# Patient Record
Sex: MALE | Race: WHITE | NOT HISPANIC OR LATINO | Employment: FULL TIME | URBAN - METROPOLITAN AREA
[De-identification: names, ages, dates, MRNs, and addresses within clinical notes are randomized per-mention and may not be internally consistent; named-entity substitution may affect disease eponyms.]

---

## 2017-03-02 DIAGNOSIS — J45.20 INTERMITTENT ASTHMA, UNCOMPLICATED: ICD-10-CM

## 2017-03-02 NOTE — TELEPHONE ENCOUNTER
Flovent  [facility]G Inhaler       Last Written Prescription Date: 1/31/17  Last Fill Quantity: 1, # refills: 0    Last Office Visit with G, P or [facility] prescribing provider:  8/29/16   Future Office Visit:       Date of Last Asthma Action Plan Letter:   Asthma Action Plan Q1 Year    Topic Date Due     Asthma Action Plan - yearly  08/29/2017      Asthma Control Test:   ACT Total Scores 8/29/2016   ACT TOTAL SCORE -   ASTHMA ER VISITS -   ASTHMA HOSPITALIZATIONS -   ACT TOTAL SCORE (Goal Greater than or Equal to 20) 25   In the past 12 months, how many times did you visit the emergency room for your asthma without being admitted to the hospital? 0   In the past 12 months, how many times were you hospitalized overnight because of your asthma? 0       Date of Last Spirometry Test:   No results found for this or any previous visit.

## 2017-03-03 RX ORDER — FLUTICASONE PROPIONATE 220 UG/1
2 AEROSOL, METERED RESPIRATORY (INHALATION) 2 TIMES DAILY
Qty: 1 INHALER | Refills: 0 | Status: CANCELLED | OUTPATIENT
Start: 2017-03-03

## 2017-03-03 NOTE — TELEPHONE ENCOUNTER
Left message for pt to return call to clinic    Jessica Ngo RN    He is requesting refill of flovent, he needs to make appt to be seen per last refill

## 2017-03-06 NOTE — TELEPHONE ENCOUNTER
Attempted call back. Spoke with a female who states pt was not available.     When pt. Calls back, please assist with setting up asthma follow up, then route to triage once appt. Is set up so medication can be refilled. Thank you.    Jen Romero RN  [facility]

## 2017-03-07 NOTE — TELEPHONE ENCOUNTER
Called and left message stating he needs to call and schedule an appt once this is scheduled, can refill medication.    Jen Romero RN  [facility]

## 2017-03-08 NOTE — TELEPHONE ENCOUNTER
Cecil scheduled, no further action needed. Pt states he has enough medication to get him through to scheduled date.

## 2017-03-17 ENCOUNTER — OFFICE VISIT (OUTPATIENT)
Dept: FAMILY MEDICINE | Facility: CLINIC | Age: 47
End: 2017-03-17
Payer: COMMERCIAL

## 2017-03-17 VITALS
OXYGEN SATURATION: 98 % | TEMPERATURE: 96.6 F | HEIGHT: 71 IN | WEIGHT: 252.3 LBS | BODY MASS INDEX: 35.32 KG/M2 | SYSTOLIC BLOOD PRESSURE: 155 MMHG | HEART RATE: 84 BPM | DIASTOLIC BLOOD PRESSURE: 88 MMHG

## 2017-03-17 DIAGNOSIS — J45.30 MILD PERSISTENT ASTHMA WITHOUT COMPLICATION: ICD-10-CM

## 2017-03-17 DIAGNOSIS — I10 ESSENTIAL HYPERTENSION WITH GOAL BLOOD PRESSURE LESS THAN 140/90: Primary | ICD-10-CM

## 2017-03-17 PROCEDURE — 99214 OFFICE O/P EST MOD 30 MIN: CPT | Performed by: FAMILY MEDICINE

## 2017-03-17 RX ORDER — FLUTICASONE PROPIONATE 220 UG/1
2 AEROSOL, METERED RESPIRATORY (INHALATION) 2 TIMES DAILY
Qty: 1 INHALER | Refills: 11 | Status: SHIPPED | OUTPATIENT
Start: 2017-03-17 | End: 2018-10-03

## 2017-03-17 RX ORDER — ALBUTEROL SULFATE 90 UG/1
2 AEROSOL, METERED RESPIRATORY (INHALATION) EVERY 6 HOURS PRN
Qty: 1 INHALER | Refills: 11 | Status: SHIPPED | OUTPATIENT
Start: 2017-03-17 | End: 2017-09-11

## 2017-03-17 NOTE — MR AVS SNAPSHOT
After Visit Summary   3/17/2017    Tremaine King    MRN: [id number]           Patient Information     Date Of Birth          1970        Visit Information        Provider Department      3/17/2017 2:30 PM Samy Marcelino MD [facility]        Today's Diagnoses     Essential hypertension with goal blood pressure less than 140/90    -  1    Mild persistent asthma without complication          Care Instructions    -Please follow up in August with me and cardiology for a recheck.        Follow-ups after your visit        Your next 10 appointments already scheduled     Jul 03, 2017  9:20 AM CDT   Return Sleep Patient with Simon Walker MD   [facility] [place], Sleep Study ([facility])    [address] [place] [address] [phone]              Who to contact     If you have questions or need follow up information about today's clinic visit or your schedule please contact [facility] directly at [phone].  Normal or non-critical lab and imaging results will be communicated to you by Poeticahart, letter or phone within 4 business days after the clinic has received the results. If you do not hear from us within 7 days, please contact the clinic through real trendst or phone. If you have a critical or abnormal lab result, we will notify you by phone as soon as possible.  Submit refill requests through [employer] [facility] or call your pharmacy and they will forward the refill request to us. Please allow 3 business days for your refill to be completed.          Additional Information About Your Visit        MyChart Information     [employer] gives you secure access to your electronic health record. If you see a primary care provider, you can also send messages to your care team and make appointments. If you have questions, please call your primary care clinic.  If you do not have a primary care provider, please call [phone] and they  "will assist you.        Care EveryWhere ID     This is your Care EveryWhere ID. This could be used by other organizations to access your [facility] medical records  [id number]        Your Vitals Were     Pulse Temperature Height Pulse Oximetry BMI (Body Mass Index)       84 96.6  F (35.9  C) (Oral) 5' 11\" (1.803 m) 98% 35.19 kg/m2        Blood Pressure from Last 3 Encounters:   03/17/17 155/88   08/29/16 120/71   07/06/16 138/89    Weight from Last 3 Encounters:   03/17/17 252 lb 4.8 oz (114.4 kg)   08/29/16 235 lb 4.8 oz (106.7 kg)   07/06/16 238 lb 12.8 oz (108.3 kg)              Today, you had the following     No orders found for display         Where to get your medicines      These medications were sent to [place] [id number] [place] [facility] [address] [place] [address]  [address] [facility] [address]     Phone:  [phone]     albuterol 108 (90 BASE) MCG/ACT Inhaler    fluticasone 220 MCG/ACT Inhaler          Primary Care Provider Office Phone # Fax #    Samy Marcelino [phone]       [facility] [address]  [facility] [address]        Thank you!     Thank you for choosing [facility]  for your care. Our goal is always to provide you with excellent care. Hearing back from our patients is one way we can continue to improve our services. Please take a few minutes to complete the written survey that you may receive in the mail after your visit with us. Thank you!             Your Updated Medication List - Protect others around you: Learn how to safely use, store and throw away your medicines at www.disposemymeds.org.          This list is accurate as of: 3/17/17 11:59 PM.  Always use your most recent med list.                   Brand Name Dispense Instructions for use    albuterol 108 (90 BASE) MCG/ACT Inhaler    albuterol    1 Inhaler    Inhale 2 puffs into the lungs every 6 hours as needed for shortness of breath / dyspnea       allopurinol " 300 MG tablet    ZYLOPRIM    90 tablet    Take 1 tablet (300 mg) by mouth daily       amLODIPine 2.5 MG tablet    NORVASC    270 tablet    Take 3 tabs daily       amoxicillin 500 MG capsule    AMOXIL    12 capsule    Please take a total of 2 grams (four 500 mg capsules) 30-60 minutes prior to dental procedure.       aspirin 325 MG tablet      Take  by mouth daily.       fluticasone 220 MCG/ACT Inhaler    FLOVENT HFA    1 Inhaler    Inhale 2 puffs into the lungs 2 times daily 2 puffs       IBUPROFEN PO      Take 400 mg by mouth 2 times daily as needed.       lisinopril 40 MG tablet    PRINIVIL/ZESTRIL    90 tablet    Take 1 tablet (40 mg) by mouth daily       metoprolol 100 MG 24 hr tablet    TOPROL-XL    90 tablet    Take 1 tablet (100 mg) by mouth daily Must keep 8/29/16 appt for further refills.       order for DME      Equipment being ordered: CPAP Patient Tremaine King was set up at [facility] on September 14, 2015. Patient received a [employer] DreamStation Auto CPAP Auto. Pressures were set at Auto 12 - 12 cm H2O.   Patient?s ramp is 9 cm H2O for Auto and FLEX/EPR is A Flex.  Patient received a [employer] Mask name: BLUE GEL NASAL  Nasal mask Size Medium, heated tubing and heated humidifier.  Patient is not enrolled in the STM Program and does not need to meet compliance. Samina Arora

## 2017-03-17 NOTE — NURSING NOTE
"Chief Complaint   Patient presents with     Asthma       Initial /88  Pulse 84  Temp 96.6  F (35.9  C) (Oral)  Ht 5' 11\" (1.803 m)  Wt 252 lb 4.8 oz (114.4 kg)  SpO2 98%  BMI 35.19 kg/m2 Estimated body mass index is 35.19 kg/(m^2) as calculated from the following:    Height as of this encounter: 5' 11\" (1.803 m).    Weight as of this encounter: 252 lb 4.8 oz (114.4 kg).  Medication Reconciliation: complete   Gely Saba MA'    "

## 2017-03-17 NOTE — PROGRESS NOTES
SUBJECTIVE:                                                    Tremaine King is a 46 year old male who presents to clinic today for the following health issues:    Asthma Follow-Up    Was ACT completed today?    Yes    ACT Total Scores 8/29/2016   ACT TOTAL SCORE -   ASTHMA ER VISITS -   ASTHMA HOSPITALIZATIONS -   ACT TOTAL SCORE (Goal Greater than or Equal to 20) 25   In the past 12 months, how many times did you visit the emergency room for your asthma without being admitted to the hospital? 0   In the past 12 months, how many times were you hospitalized overnight because of your asthma? 0       Recent asthma triggers that patient is dealing with: None    Amount of exercise or physical activity: 1 day/week for an average of 30-45 minutes    Problems taking medications regularly: No    Medication side effects: none    Diet: regular (no restrictions)    Patient says that his asthma is doing well, and that he has been using his inhalers as prescribed, though sometimes he forgets to take the flovent daily. He has not been exercising as regularly as he would like to, but he says that his asthma has been in good control when he exercises and he has to use his albuterol for rescue infrequently. He will run a half marathon in 1 month, and he thinks with training he will be better.    Hypertension Follow-up      Outpatient blood pressures are not being checked.    Low Salt Diet: not monitoring salt     Patient's blood pressure is elevated today, and he thinks it is because he has not been as active as he would like to be. He continues to take his medications as prescribed. He reports that he has been gaining weight and has not been paying as close attention to his diet and exercise as he would like. Patient is planning to run a half marathon in 1 month, and he says that he will start training soon and hopes to get his nutrition back on track. History of mitral valve repair and ASD.    Problem list and histories reviewed  & adjusted, as indicated.  Additional history: as documented    Patient Active Problem List   Diagnosis     History of repair of mitral valve     CARDIOVASCULAR SCREENING; LDL GOAL LESS THAN 130     ASD (atrial septal defect)     Intermittent asthma, uncomplicated     Essential hypertension with goal blood pressure less than 140/90     Idiopathic gout, unspecified chronicity, unspecified site     Past Surgical History   Procedure Laterality Date     Repair valve mitral  AGE 3 AND JUNE 2005     annuloplasty ring and MVR in June 2005     Repair atrial septal defect  AGE 3     Repair valve mitral         Social History   Substance Use Topics     Smoking status: Former Smoker     Types: Cigars     Smokeless tobacco: Never Used      Comment: quit smoking cigarettes and cigars at ~ age 22 (was light smoker for about 4 years))     Alcohol use Yes      Comment: 1-2/day on weekdays, 2-3/day on wknds     Family History   Problem Relation Age of Onset     DIABETES Mother      onset past age 70         Current Outpatient Prescriptions   Medication Sig Dispense Refill     albuterol (ALBUTEROL) 108 (90 BASE) MCG/ACT Inhaler Inhale 2 puffs into the lungs every 6 hours as needed for shortness of breath / dyspnea 1 Inhaler 0     fluticasone (FLOVENT HFA) 220 MCG/ACT Inhaler Inhale 2 puffs into the lungs 2 times daily 2 puffs 1 Inhaler 0     lisinopril (PRINIVIL,ZESTRIL) 40 MG tablet Take 1 tablet (40 mg) by mouth daily 90 tablet 3     allopurinol (ZYLOPRIM) 300 MG tablet Take 1 tablet (300 mg) by mouth daily 90 tablet 3     metoprolol (TOPROL-XL) 100 MG 24 hr tablet Take 1 tablet (100 mg) by mouth daily Must keep 8/29/16 appt for further refills. 90 tablet 3     amLODIPine (NORVASC) 2.5 MG tablet Take 3 tabs daily 270 tablet 3     order for DME Equipment being ordered: CPAP  Patient Tremaine King was set up at [facility] on September 14, 2015. Patient received a [employer] DreamStation Auto CPAP Auto. Pressures were set at  "Auto 12 - 12 cm H2O.   Patient s ramp is 9 cm H2O for Auto and FLEX/EPR is A Flex.  Patient received a [employer] Mask name: BLUE GEL NASAL  Nasal mask Size Medium, heated tubing and heated humidifier.  Patient is not enrolled in the STM Program and does not need to meet compliance. Samina Arora       aspirin 325 MG tablet Take  by mouth daily.       IBUPROFEN PO Take 400 mg by mouth 2 times daily as needed.         amoxicillin (AMOXIL) 500 MG capsule Please take a total of 2 grams (four 500 mg capsules) 30-60 minutes prior to dental procedure. (Patient not taking: Reported on 3/17/2017) 12 capsule 3     No Known Allergies  BP Readings from Last 3 Encounters:   03/17/17 155/88   08/29/16 120/71   07/06/16 138/89    Wt Readings from Last 3 Encounters:   03/17/17 114.4 kg (252 lb 4.8 oz)   08/29/16 106.7 kg (235 lb 4.8 oz)   07/06/16 108.3 kg (238 lb 12.8 oz)         Reviewed and updated as needed this visit by clinical staff  Tobacco  Allergies  Meds  Med Hx  Surg Hx  Fam Hx  Soc Hx      Reviewed and updated as needed this visit by Provider         ROS:  Denies headache, insomnia, chest pain, shortness of breath, cough, heartburn, bowel issues, bladder issues, neck pain, back pain, hip pain, knee pain, ankle pain, or foot pain. Remainder of ROS is negative unless otherwise noted above or in HPI.    This document serves as a record of the services and decisions personally performed and made by Samy Marcelino MD. It was created on his behalf by Jorden Hsu, a trained medical scribe. The creation of this document is based the provider's statements to the medical scribe.  Jorden Hsu 2:56 PM March 17, 2017    OBJECTIVE:                                                    /88  Pulse 84  Temp 96.6  F (35.9  C) (Oral)  Ht 1.803 m (5' 11\")  Wt 114.4 kg (252 lb 4.8 oz)  SpO2 98%  BMI 35.19 kg/m2  Body mass index is 35.19 kg/(m^2).  GENERAL: healthy, alert and no distress  RESP: Forced " expiration slightly prolonged,  lungs clear to auscultation - no rales, rhonchi or wheezes  CV: Soft murmur, regular rate and rhythm, normal S1 S2, no S3 or S4, noclick or rub, no peripheral edema and peripheral pulses strong  NEURO: Normal strength and tone, mentation intact and speech normal  PSYCH: mentation appears normal, affect normal/bright    Diagnostic Test Results:  No results found for this or any previous visit (from the past 24 hour(s)).     ASSESSMENT/PLAN:                                                      (I10) Essential hypertension with goal blood pressure less than 140/90  (primary encounter diagnosis)  Comment: Not at goal. Controlled on lisinopril, metoprolol and amlodipine.  Plan: Continue on current medications. Follow up as needed.    (J45.30) Mild persistent asthma without complication  Comment: Stable and unchanged since last visit. Controlled on albuterol and fluticasone.  Plan: albuterol (ALBUTEROL) 108 (90 BASE) MCG/ACT         Inhaler, fluticasone (FLOVENT HFA) 220 MCG/ACT         Inhaler        Continue on current medication. Follow up as needed.    Patient Instructions   -Please follow up in August with me and cardiology for a recheck.    The information in this document, created by the medical scribe for me, accurately reflects the services I personally performed and the decisions made by me. I have reviewed and approved this document for accuracy prior to leaving the patient care area.   Samy Marcelino MD 2:56 PM March 17, 2017  [facility]

## 2017-03-18 ASSESSMENT — ASTHMA QUESTIONNAIRES: ACT_TOTALSCORE: 24

## 2017-03-18 ASSESSMENT — PATIENT HEALTH QUESTIONNAIRE - PHQ9: SUM OF ALL RESPONSES TO PHQ QUESTIONS 1-9: 0

## 2017-08-10 ENCOUNTER — TELEPHONE (OUTPATIENT)
Dept: FAMILY MEDICINE | Facility: CLINIC | Age: 47
End: 2017-08-10

## 2017-08-10 NOTE — TELEPHONE ENCOUNTER
Called patient/ patient unvailable, sent Campaly[employer] message to schedule HTN follow-up    Honey Lawler CMA

## 2017-08-27 DIAGNOSIS — I10 ESSENTIAL HYPERTENSION WITH GOAL BLOOD PRESSURE LESS THAN 140/90: ICD-10-CM

## 2017-08-28 RX ORDER — AMLODIPINE BESYLATE 2.5 MG/1
TABLET ORAL
Qty: 270 TABLET | Refills: 0 | Status: SHIPPED | OUTPATIENT
Start: 2017-08-28 | End: 2017-09-11

## 2017-08-28 NOTE — TELEPHONE ENCOUNTER
Prescription approved per [facility].    Jessica Ngo RN   [facility]

## 2017-08-28 NOTE — TELEPHONE ENCOUNTER
AMLODIPINE BESYLATE 2.5 MG TAB        Last Written Prescription Date: 8/29/16  Last Fill Quantity: 270, # refills: 3    Last Office Visit with [facility], [facility] or [facility] prescribing provider:  3/17/17   Future Office Visit:    Next 5 appointments (look out 90 days)     Sep 11, 2017  9:30 AM CDT   PHYSICAL with Samy Marcelino MD   [facility]    [address] [place] [address] [phone]                    BP Readings from Last 3 Encounters:   03/17/17 155/88   08/29/16 120/71   07/06/16 138/89

## 2017-09-11 ENCOUNTER — OFFICE VISIT (OUTPATIENT)
Dept: FAMILY MEDICINE | Facility: CLINIC | Age: 47
End: 2017-09-11
Payer: COMMERCIAL

## 2017-09-11 DIAGNOSIS — I10 ESSENTIAL HYPERTENSION WITH GOAL BLOOD PRESSURE LESS THAN 140/90: ICD-10-CM

## 2017-09-11 DIAGNOSIS — J45.30 MILD PERSISTENT ASTHMA WITHOUT COMPLICATION: ICD-10-CM

## 2017-09-11 DIAGNOSIS — Z00.00 ROUTINE GENERAL MEDICAL EXAMINATION AT A HEALTH CARE FACILITY: Primary | ICD-10-CM

## 2017-09-11 PROBLEM — G47.33 OSA (OBSTRUCTIVE SLEEP APNEA): Status: ACTIVE | Noted: 2017-09-11

## 2017-09-11 LAB
ALBUMIN SERPL-MCNC: 4.2 G/DL (ref 3.4–5)
ALP SERPL-CCNC: 75 U/L (ref 40–150)
ALT SERPL W P-5'-P-CCNC: 57 U/L (ref 0–70)
ANION GAP SERPL CALCULATED.3IONS-SCNC: 8 MMOL/L (ref 3–14)
AST SERPL W P-5'-P-CCNC: 43 U/L (ref 0–45)
BILIRUB SERPL-MCNC: 0.7 MG/DL (ref 0.2–1.3)
BUN SERPL-MCNC: 16 MG/DL (ref 7–30)
CALCIUM SERPL-MCNC: 9.7 MG/DL (ref 8.5–10.1)
CHLORIDE SERPL-SCNC: 101 MMOL/L (ref 94–109)
CHOLEST SERPL-MCNC: 168 MG/DL
CO2 SERPL-SCNC: 27 MMOL/L (ref 20–32)
CREAT SERPL-MCNC: 1.11 MG/DL (ref 0.66–1.25)
GFR SERPL CREATININE-BSD FRML MDRD: 70 ML/MIN/1.7M2
GLUCOSE SERPL-MCNC: 129 MG/DL (ref 70–99)
HDLC SERPL-MCNC: 54 MG/DL
LDLC SERPL CALC-MCNC: 91 MG/DL
NONHDLC SERPL-MCNC: 114 MG/DL
POTASSIUM SERPL-SCNC: 4.3 MMOL/L (ref 3.4–5.3)
PROT SERPL-MCNC: 7.7 G/DL (ref 6.8–8.8)
SODIUM SERPL-SCNC: 136 MMOL/L (ref 133–144)
TRIGL SERPL-MCNC: 117 MG/DL

## 2017-09-11 PROCEDURE — 80061 LIPID PANEL: CPT | Performed by: FAMILY MEDICINE

## 2017-09-11 PROCEDURE — 36415 COLL VENOUS BLD VENIPUNCTURE: CPT | Performed by: FAMILY MEDICINE

## 2017-09-11 PROCEDURE — 99396 PREV VISIT EST AGE 40-64: CPT | Performed by: FAMILY MEDICINE

## 2017-09-11 PROCEDURE — 80053 COMPREHEN METABOLIC PANEL: CPT | Performed by: FAMILY MEDICINE

## 2017-09-11 RX ORDER — ALLOPURINOL 300 MG/1
300 TABLET ORAL DAILY
Qty: 90 TABLET | Refills: 3 | Status: SHIPPED | OUTPATIENT
Start: 2017-09-11 | End: 2018-10-03

## 2017-09-11 RX ORDER — METOPROLOL SUCCINATE 100 MG/1
100 TABLET, EXTENDED RELEASE ORAL DAILY
Qty: 90 TABLET | Refills: 3 | Status: SHIPPED | OUTPATIENT
Start: 2017-09-11 | End: 2018-09-28

## 2017-09-11 RX ORDER — ALBUTEROL SULFATE 90 UG/1
2 AEROSOL, METERED RESPIRATORY (INHALATION) EVERY 6 HOURS PRN
Qty: 1 INHALER | Refills: 11 | Status: SHIPPED | OUTPATIENT
Start: 2017-09-11 | End: 2018-10-03

## 2017-09-11 RX ORDER — AMOXICILLIN 500 MG/1
CAPSULE ORAL
Qty: 12 CAPSULE | Refills: 3 | Status: SHIPPED | OUTPATIENT
Start: 2017-09-11 | End: 2018-10-03

## 2017-09-11 RX ORDER — AMLODIPINE BESYLATE 2.5 MG/1
TABLET ORAL
Qty: 270 TABLET | Refills: 3 | Status: SHIPPED | OUTPATIENT
Start: 2017-09-11 | End: 2018-10-03

## 2017-09-11 RX ORDER — LISINOPRIL 40 MG/1
40 TABLET ORAL DAILY
Qty: 90 TABLET | Refills: 3 | Status: SHIPPED | OUTPATIENT
Start: 2017-09-11 | End: 2018-09-28

## 2017-09-11 NOTE — LETTER
My Asthma Action Plan  Name: Tremaine King   YOB: 1970  Date: 9/11/2017   My doctor: Samy Marcelino MD   My clinic: [facility]        My Control Medicine: Fluticasone propionate (Flovent) -   mcg twice daily  My Rescue Medicine: Albuterol (Proair/Ventolin/Proventil) inhaler every 6 hours as needed   My Asthma Severity: mild persistent  Avoid your asthma triggers: Patient is unaware of triggers  None            GREEN ZONE   Good Control    I feel good    No cough or wheeze    Can work, sleep and play without asthma symptoms       Take your asthma control medicine every day.     1. If exercise triggers your asthma, take your rescue medication    15 minutes before exercise or sports, and    During exercise if you have asthma symptoms  2. Spacer to use with inhaler: If you have a spacer, make sure to use it with your inhaler             YELLOW ZONE Getting Worse  I have ANY of these:    I do not feel good    Cough or wheeze    Chest feels tight    Wake up at night   1. Keep taking your Green Zone medications  2. Start taking your rescue medicine:    every 20 minutes for up to 1 hour. Then every 4 hours for 24-48 hours.  3. If you stay in the Yellow Zone for more than 12-24 hours, contact your doctor.  4. If you do not return to the Green Zone in 12-24 hours or you get worse, start taking your oral steroid medicine if prescribed by your provider.           RED ZONE Medical Alert - Get Help  I have ANY of these:    I feel awful    Medicine is not helping    Breathing getting harder    Trouble walking or talking    Nose opens wide to breathe       1. Take your rescue medicine NOW  2. If your provider has prescribed an oral steroid medicine, start taking it NOW  3. Call your doctor NOW  4. If you are still in the Red Zone after 20 minutes and you have not reached your doctor:    Take your rescue medicine again and    Call 911 or go to the emergency room right away    See your  regular doctor within 2 weeks of an Emergency Room or Urgent Care visit for follow-up treatment.        Electronically signed by: Miguel Hsu, September 11, 2017    Annual Reminders:  Meet with Asthma Educator,  Flu Shot in the Fall, consider Pneumonia Vaccination for patients with asthma (aged 19 and older).    Pharmacy:    [id number] [place], [place] - [address] [place] [address] [place] [address]                    Asthma Triggers  How To Control Things That Make Your Asthma Worse    Triggers are things that make your asthma worse.  Look at the list below to help you find your triggers and what you can do about them.  You can help prevent asthma flare-ups by staying away from your triggers.      Trigger                                                          What you can do   Cigarette Smoke  Tobacco smoke can make asthma worse. Do not allow smoking in your home, car or around you.  Be sure no one smokes at a child s day care or school.  If you smoke, ask your health care provider for ways to help you quit.  Ask family members to quit too.  Ask your health care provider for a referral to Quit Plan to help you quit smoking, or call 6-940-316-PLAN.     Colds, Flu, Bronchitis  These are common triggers of asthma. Wash your hands often.  Don t touch your eyes, nose or mouth.  Get a flu shot every year.     Dust Mites  These are tiny bugs that live in cloth or carpet. They are too small to see. Wash sheets and blankets in hot water every week.   Encase pillows and mattress in dust mite proof covers.  Avoid having carpet if you can. If you have carpet, vacuum weekly.   Use a dust mask and HEPA vacuum.   Pollen and Outdoor Mold  Some people are allergic to trees, grass, or weed pollen, or molds. Try to keep your windows closed.  Limit time out doors when pollen count is high.   Ask you health care provider about taking medicine during allergy season.     Animal  Dander  Some people are allergic to skin flakes, urine or saliva from pets with fur or feathers. Keep pets with fur or feathers out of your home.    If you can t keep the pet outdoors, then keep the pet out of your bedroom.  Keep the bedroom door closed.  Keep pets off cloth furniture and away from stuffed toys.     Mice, Rats, and Cockroaches  Some people are allergic to the waste from these pests.   Cover food and garbage.  Clean up spills and food crumbs.  Store grease in the refrigerator.   Keep food out of the bedroom.   Indoor Mold  This can be a trigger if your home has high moisture. Fix leaking faucets, pipes, or other sources of water.   Clean moldy surfaces.  Dehumidify basement if it is damp and smelly.   Smoke, Strong Odors, and Sprays  These can reduce air quality. Stay away from strong odors and sprays, such as perfume, powder, hair spray, paints, smoke incense, paint, cleaning products, candles and new carpet.   Exercise or Sports  Some people with asthma have this trigger. Be active!  Ask your doctor about taking medicine before sports or exercise to prevent symptoms.    Warm up for 5-10 minutes before and after sports or exercise.     Other Triggers of Asthma  Cold air:  Cover your nose and mouth with a scarf.  Sometimes laughing or crying can be a trigger.  Some medicines and food can trigger asthma.

## 2017-09-11 NOTE — NURSING NOTE
"Chief Complaint   Patient presents with     Physical       Initial There were no vitals taken for this visit. Estimated body mass index is 35.19 kg/(m^2) as calculated from the following:    Height as of 3/17/17: 5' 11\" (1.803 m).    Weight as of 3/17/17: 252 lb 4.8 oz (114.4 kg).  Medication Reconciliation: complete     Eva Rosario MA      "

## 2017-09-11 NOTE — PROGRESS NOTES
SUBJECTIVE:   CC: Tremaine King is an 47 year old male who presents for preventative health visit.     Patient says that he has not been exercising or eating a healthy diet over the last year, and was heavier today than expected. He is planning on starting to exercise and eat healthier, and is going to run a 5k later this year. History of atrial septal defect and mitral valve repair, and he has been doing well and is scheduled to see cardiology in November.    Patient says that his asthma has been worse lately since he went to [place] on vacation in August. He continues to use albuterol for rescue and flovent as a preventative, though he does not frequently use twice daily.    Patient has not been having regular eye exams.    Healthy Habits:  Answers for HPI/ROS submitted by the patient on 9/10/2017   Annual Exam:  Getting at least 3 servings of Calcium per day:: Yes  Bi-annual eye exam:: NO  Dental care twice a year:: Yes  Sleep apnea or symptoms of sleep apnea:: Sleep apnea  Diet:: Regular (no restrictions)  Frequency of exercise:: 1 day/week  Taking medications regularly:: Yes  Medication side effects:: None  Additional concerns today:: No  PHQ-2 Score: 0  Duration of exercise:: 30-45 minutes      Today's PHQ-2 Score:   PHQ-2 ( 1999 Pf[employer]) 9/10/2017 3/17/2017   Q1: Little interest or pleasure in doing things 0 0   Q2: Feeling down, depressed or hopeless 0 0   PHQ-2 Score 0 0   Q1: Little interest or pleasure in doing things Not at all -   Q2: Feeling down, depressed or hopeless Not at all -   PHQ-2 Score 0 -         Abuse: Current or Past(Physical, Sexual or Emotional)- No  Do you feel safe in your environment - Yes  Social History   Substance Use Topics     Smoking status: Former Smoker     Types: Cigars     Smokeless tobacco: Never Used      Comment: quit smoking cigarettes and cigars at ~ age 22 (was light smoker for about 4 years))     Alcohol use Yes      Comment: 1-2/day on weekdays, 2-3/day on wknds      The patient does not drink >3 drinks per day nor >7 drinks per week.    Last PSA: No results found for: PSA    Reviewed orders with patient. Reviewed health maintenance and updated orders accordingly - Yes  BP Readings from Last 3 Encounters:   03/17/17 155/88   08/29/16 120/71   07/06/16 138/89    Wt Readings from Last 3 Encounters:   03/17/17 114.4 kg (252 lb 4.8 oz)   08/29/16 106.7 kg (235 lb 4.8 oz)   07/06/16 108.3 kg (238 lb 12.8 oz)        Patient Active Problem List   Diagnosis     History of repair of mitral valve     CARDIOVASCULAR SCREENING; LDL GOAL LESS THAN 130     ASD (atrial septal defect)     Essential hypertension with goal blood pressure less than 140/90     Idiopathic gout, unspecified chronicity, unspecified site     BMI 35.0-35.9,adult     KOTA (obstructive sleep apnea)     Past Surgical History:   Procedure Laterality Date     REPAIR ATRIAL SEPTAL DEFECT  AGE 3     REPAIR VALVE MITRAL  AGE 3 AND JUNE 2005    annuloplasty ring and MVR in June 2005     REPAIR VALVE MITRAL         Social History   Substance Use Topics     Smoking status: Former Smoker     Types: Cigars     Smokeless tobacco: Never Used      Comment: quit smoking cigarettes and cigars at ~ age 22 (was light smoker for about 4 years))     Alcohol use Yes      Comment: 1-2/day on weekdays, 2-3/day on wknds     Family History   Problem Relation Age of Onset     DIABETES Mother      onset past age 70         Current Outpatient Prescriptions   Medication Sig Dispense Refill     amLODIPine (NORVASC) 2.5 MG tablet TAKE 3 TABLETS BY MOUTH DAILY 270 tablet 0     albuterol (ALBUTEROL) 108 (90 BASE) MCG/ACT Inhaler Inhale 2 puffs into the lungs every 6 hours as needed for shortness of breath / dyspnea 1 Inhaler 11     fluticasone (FLOVENT HFA) 220 MCG/ACT Inhaler Inhale 2 puffs into the lungs 2 times daily 2 puffs 1 Inhaler 11     lisinopril (PRINIVIL,ZESTRIL) 40 MG tablet Take 1 tablet (40 mg) by mouth daily 90 tablet 3     allopurinol  (ZYLOPRIM) 300 MG tablet Take 1 tablet (300 mg) by mouth daily 90 tablet 3     metoprolol (TOPROL-XL) 100 MG 24 hr tablet Take 1 tablet (100 mg) by mouth daily Must keep 8/29/16 appt for further refills. 90 tablet 3     order for DME Equipment being ordered: CPAP  Patient Tremaine King was set up at [place] on September 14, 2015. Patient received a [employer] DreamStation Auto CPAP Auto. Pressures were set at Auto 12 - 12 cm H2O.   Patient s ramp is 9 cm H2O for Auto and FLEX/EPR is A Flex.  Patient received a [employer] Mask name: BLUE GEL NASAL  Nasal mask Size Medium, heated tubing and heated humidifier.  Patient is not enrolled in the STM Program and does not need to meet compliance. Samina Arora       aspirin 325 MG tablet Take  by mouth daily.       IBUPROFEN PO Take 400 mg by mouth 2 times daily as needed.         No Known Allergies        Reviewed and updated as needed this visit by clinical staff  Allergies  Meds         Reviewed and updated as needed this visit by Provider            ROS:  Denies headache, insomnia, chest pain, shortness of breath, cough, heartburn, bowel issues, bladder issues, neck pain, back pain, hip pain, knee pain, ankle pain, or foot pain. Remainder of ROS is negative unless otherwise noted above or in HPI.    This document serves as a record of the services and decisions personally performed and made by Samy Marcelino MD. It was created on his behalf by Jorden Hsu, a trained medical scribe. The creation of this document is based the provider's statements to the medical scribe.  Jorden Hsu 9:39 AM September 11, 2017    OBJECTIVE:   There were no vitals taken for this visit.  EXAM:  GENERAL: healthy, alert and no distress  EYES: Eyes grossly normal to inspection, PERRL and conjunctivae and sclerae normal. EOMI.  HENT: ear canals and TM's normal, nose and mouth without ulcers or lesions  NECK: no adenopathy, no asymmetry, masses, or scars  and thyroid normal to palpation. TMJ normal. No bruits.  RESP: lungs clear to auscultation - no rales, rhonchi or wheezes  CV: 3/6 systolic murmur, regular rate and rhythm, normal S1 S2, no S3 or S4, click or rub and peripheral pulses strong  ABDOMEN: soft, nontender, no hepatosplenomegaly, no masses and bowel sounds normal   (male): normal male genitalia without lesions or urethral discharge, no hernia  RECTAL: normal sphincter tone, no rectal masses, prostate normal size, smooth, nontender without nodules or masses  MS: no gross musculoskeletal defects noted, trace edema with hemositiran freckling. Calves nontender.  SKIN: several well healed sternotomy scars well healed  NEURO: Normal strength and tone, mentation intact and speech normal. Knee reflexes normal. Slight tremor.  PSYCH: mentation appears normal, affect normal/bright  LYMPH: no cervical, supraclavicular, axillary, or inguinal adenopathy    ASSESSMENT/PLAN:   (Z00.00) Routine general medical examination at a health care facility  (primary encounter diagnosis)  Comment: Routine physical and labs completed today.  Plan: Follow up with results from lab.    (Z68.35) BMI 35.0-35.9,adult  Comment: Worsened since last visit. Encouraged regular exercise and eating a healthy diet.  Plan: Will continue to monitor. Follow up as needed.    (J45.30) Mild persistent asthma without complication  Comment: Stable, but worsened since last visit. Controlled on albuterol and fluticasone.  Plan: albuterol (PROAIR HFA) 108 (90 BASE) MCG/ACT         Inhaler        Continue on current medications. Follow up as needed.    (Z95.2) S/P MVR (mitral valve replacement)  Comment: Stable and unchanged since last visit. Controlled on amoxicillin as needed. Patient continues to see cardiology.  Plan: amoxicillin (AMOXIL) 500 MG capsule        Continue on amoxicillin as needed. Continue to see cardiology. Follow up as needed.    (I10) Essential hypertension with goal blood pressure  less than 140/90  Comment: Not at goal today. Controlled on lisinopril, amlodipine and metoprolol. Labs completed today.  Plan: metoprolol (TOPROL-XL) 100 MG 24 hr tablet,         lisinopril (PRINIVIL/ZESTRIL) 40 MG tablet,         amLODIPine (NORVASC) 2.5 MG tablet,         Comprehensive metabolic panel, Lipid panel         reflex to direct LDL        Continue on current medications. Follow up with results from lab.    (M10.00) Idiopathic gout, unspecified chronicity, unspecified site  Comment: Controlled on allopurinol.  Plan: allopurinol (ZYLOPRIM) 300 MG tablet        Continue on current medication. Follow up as needed.    Patient Instructions     Preventive Health Recommendations  Male Ages 40 to 49    Yearly exam:             See your health care provider every year in order to  o   Review health changes.   o   Discuss preventive care.    o   Review your medicines if your doctor has prescribed any.    You should be tested each year for STDs (sexually transmitted diseases) if you re at risk.     Have a cholesterol test every 5 years.     Have a colonoscopy (test for colon cancer) if someone in your family has had colon cancer or polyps before age 50.     After age 45, have a diabetes test (fasting glucose). If you are at risk for diabetes, you should have this test every 3 years.      Talk with your health care provider about whether or not a prostate cancer screening test (PSA) is right for you.    Shots: Get a flu shot each year. Get a tetanus shot every 10 years.     Nutrition:    Eat at least 5 servings of fruits and vegetables daily.     Eat whole-grain bread, whole-wheat pasta and brown rice instead of white grains and rice.     Talk to your provider about Calcium and Vitamin D.     Lifestyle    Exercise for at least 150 minutes a week (30 minutes a day, 5 days a week). This will help you control your weight and prevent disease.     Limit alcohol to one drink per day.     No smoking.     Wear sunscreen to  "prevent skin cancer.     See your dentist every six months for an exam and cleaning.          COUNSELING:  Reviewed preventive health counseling, as reflected in patient instructions     reports that he has quit smoking. His smoking use included Cigars. He has never used smokeless tobacco.  Estimated body mass index is 35.19 kg/(m^2) as calculated from the following:    Height as of 3/17/17: 1.803 m (5' 11\").    Weight as of 3/17/17: 114.4 kg (252 lb 4.8 oz).   Weight management plan: Encouraged regular exercise and eating a healthy diet    The information in this document, created by the medical scribe for me, accurately reflects the services I personally performed and the decisions made by me. I have reviewed and approved this document for accuracy prior to leaving the patient care area.   Samy Marcelino MD 9:38 AM September 11, 2017  [facility] [place]  "

## 2017-09-11 NOTE — MR AVS SNAPSHOT
After Visit Summary   9/11/2017    Tremaine King    MRN: [id number]           Patient Information     Date Of Birth          1970        Visit Information        Provider Department      9/11/2017 9:30 AM Samy Marcelino MD [facility]        Today's Diagnoses     Routine general medical examination at a health care facility    -  1    Essential hypertension with goal blood pressure less than 140/90        Mild persistent asthma without complication        BMI 35.0-35.9,adult          Care Instructions      Preventive Health Recommendations  Male Ages 40 to 49    Yearly exam:             See your health care provider every year in order to  o   Review health changes.   o   Discuss preventive care.    o   Review your medicines if your doctor has prescribed any.    You should be tested each year for STDs (sexually transmitted diseases) if you re at risk.     Have a cholesterol test every 5 years.     Have a colonoscopy (test for colon cancer) if someone in your family has had colon cancer or polyps before age 50.     After age 45, have a diabetes test (fasting glucose). If you are at risk for diabetes, you should have this test every 3 years.      Talk with your health care provider about whether or not a prostate cancer screening test (PSA) is right for you.    Shots: Get a flu shot each year. Get a tetanus shot every 10 years.     Nutrition:    Eat at least 5 servings of fruits and vegetables daily.     Eat whole-grain bread, whole-wheat pasta and brown rice instead of white grains and rice.     Talk to your provider about Calcium and Vitamin D.     Lifestyle    Exercise for at least 150 minutes a week (30 minutes a day, 5 days a week). This will help you control your weight and prevent disease.     Limit alcohol to one drink per day.     No smoking.     Wear sunscreen to prevent skin cancer.     See your dentist every six months for an exam and cleaning.               Follow-ups after your visit        Your next 10 appointments already scheduled     Sep 13, 2017  1:00 PM CDT   Return Sleep Patient with Gokul Greer MD   [facility] [place], Sleep Study ([facility])    [address] [place] [id number] [phone]            Nov 01, 2017  8:00 AM CDT   Ech Complete with [id number]    [facility] Echo ([facility])    [address] [place] [id number] [phone]           1. Please bring or wear a comfortable two-piece outfit. 2. You may eat, drink and take your normal medicines. 3. For any questions that cannot be answered, please contact the ordering physician            Nov 08, 2017  7:30 AM CST   (Arrive by 7:15 AM)   Return Visit with Tim Gotti MD   [facility] ([facility])    [address] [place] [id number] [phone]              Who to contact     If you have questions or need follow up information about today's clinic visit or your schedule please contact [facility] directly at [phone].  Normal or non-critical lab and imaging results will be communicated to you by Isofluxhart, letter or phone within 4 business days after the clinic has received the results. If you do not hear from us within 7 days, please contact the clinic through Isofluxhart or phone. If you have a critical or abnormal lab result, we will notify you by phone as soon as possible.  Submit refill requests through [facility] or call your pharmacy and they will forward the refill request to us. Please allow 3 business days for your refill to be completed.          Additional Information About Your Visit        IsofluxharCEDU Information     [facility] gives you secure access to your electronic health record. If you see a primary care provider, you can also send messages to your care team and make appointments. If  you have questions, please call your primary care clinic.  If you do not have a primary care provider, please call [phone] and they will assist you.        Care EveryWhere ID     This is your Care EveryWhere ID. This could be used by other organizations to access your [facility] medical records  [id number]         Blood Pressure from Last 3 Encounters:   03/17/17 155/88   08/29/16 120/71   07/06/16 138/89    Weight from Last 3 Encounters:   03/17/17 252 lb 4.8 oz (114.4 kg)   08/29/16 235 lb 4.8 oz (106.7 kg)   07/06/16 238 lb 12.8 oz (108.3 kg)              We Performed the Following     Comprehensive metabolic panel     Lipid panel reflex to direct LDL          Today's Medication Changes          These changes are accurate as of: 9/11/17 10:31 AM.  If you have any questions, ask your nurse or doctor.               These medicines have changed or have updated prescriptions.        Dose/Directions    amLODIPine 2.5 MG tablet   Commonly known as:  NORVASC   This may have changed:  See the new instructions.   Used for:  Essential hypertension with goal blood pressure less than 140/90        TAKE 3 TABLETS BY MOUTH DAILY   Quantity:  270 tablet   Refills:  3            Where to get your medicines      These medications were sent to [place] [address] [place] [address]  [address] [place] [address]     Phone:  [phone]     albuterol 108 (90 BASE) MCG/ACT Inhaler    allopurinol 300 MG tablet    amLODIPine 2.5 MG tablet    amoxicillin 500 MG capsule    lisinopril 40 MG tablet    metoprolol 100 MG 24 hr tablet                Primary Care Provider Office Phone # Fax #    Samy Marcelino [phone]       [address] [facility] [address]  [facility] [address]        Equal Access to Services     ALISA STOLL : Ceferino Brown, donya gabriel, lucy gagnon. So [facility] [phone].    ATENCIÓN: Arturo culp  español, tiene a aguilar disposición servicios gratuitos de asistencia lingüística. Andry barnett [phone].    We comply with applicable federal civil rights laws and [place] laws. We do not discriminate on the basis of race, color, national origin, age, disability sex, sexual orientation or gender identity.            Thank you!     Thank you for choosing [facility]  for your care. Our goal is always to provide you with excellent care. Hearing back from our patients is one way we can continue to improve our services. Please take a few minutes to complete the written survey that you may receive in the mail after your visit with us. Thank you!             Your Updated Medication List - Protect others around you: Learn how to safely use, store and throw away your medicines at www.disposemymeds.org.          This list is accurate as of: 9/11/17 10:31 AM.  Always use your most recent med list.                   Brand Name Dispense Instructions for use Diagnosis    albuterol 108 (90 BASE) MCG/ACT Inhaler    PROAIR HFA    1 Inhaler    Inhale 2 puffs into the lungs every 6 hours as needed for shortness of breath / dyspnea    Mild persistent asthma without complication       allopurinol 300 MG tablet    ZYLOPRIM    90 tablet    Take 1 tablet (300 mg) by mouth daily        amLODIPine 2.5 MG tablet    NORVASC    270 tablet    TAKE 3 TABLETS BY MOUTH DAILY    Essential hypertension with goal blood pressure less than 140/90       amoxicillin 500 MG capsule    AMOXIL    12 capsule    Please take a total of 2 grams (four 500 mg capsules) 30-60 minutes prior to dental procedure.        aspirin 325 MG tablet      Take  by mouth daily.        fluticasone 220 MCG/ACT Inhaler    FLOVENT HFA    1 Inhaler    Inhale 2 puffs into the lungs 2 times daily 2 puffs    Mild persistent asthma without complication       IBUPROFEN PO      Take 400 mg by mouth 2 times daily as needed.        lisinopril 40 MG tablet    PRINIVIL/ZESTRIL     90 tablet    Take 1 tablet (40 mg) by mouth daily    Essential hypertension with goal blood pressure less than 140/90       metoprolol 100 MG 24 hr tablet    TOPROL-XL    90 tablet    Take 1 tablet (100 mg) by mouth daily Must keep 8/29/16 appt for further refills.    Essential hypertension with goal blood pressure less than 140/90       order for DME      Equipment being ordered: CPAP Patient Tremaine King was set up at [place] on September 14, 2015. Patient received a [employer] DreamStation Auto CPAP Auto. Pressures were set at Auto 12 - 12 cm H2O.   Patient?s ramp is 9 cm H2O for Auto and FLEX/EPR is A Flex.  Patient received a [employer] Mask name: BLUE GEL NASAL  Nasal mask Size Medium, heated tubing and heated humidifier.  Patient is not enrolled in the STM Program and does not need to meet compliance. Samina Arora

## 2017-09-13 ENCOUNTER — OFFICE VISIT (OUTPATIENT)
Dept: SLEEP MEDICINE | Facility: CLINIC | Age: 47
End: 2017-09-13
Attending: INTERNAL MEDICINE
Payer: COMMERCIAL

## 2017-09-13 VITALS
OXYGEN SATURATION: 99 % | RESPIRATION RATE: 18 BRPM | WEIGHT: 257 LBS | HEART RATE: 82 BPM | BODY MASS INDEX: 35.98 KG/M2 | SYSTOLIC BLOOD PRESSURE: 161 MMHG | HEIGHT: 71 IN | DIASTOLIC BLOOD PRESSURE: 87 MMHG

## 2017-09-13 DIAGNOSIS — G47.33 OSA ON CPAP: Primary | ICD-10-CM

## 2017-09-13 PROCEDURE — 99211 OFF/OP EST MAY X REQ PHY/QHP: CPT | Mod: ZF

## 2017-09-13 NOTE — PATIENT INSTRUCTIONS
Your BMI is Body mass index is 35.84 kg/(m^2).  Weight management is a personal decision.  If you are interested in exploring weight loss strategies, the following discussion covers the approaches that may be successful. Body mass index (BMI) is one way to tell whether you are at a healthy weight, overweight, or obese. It measures your weight in relation to your height.  A BMI of 18.5 to 24.9 is in the healthy range. A person with a BMI of 25 to 29.9 is considered overweight, and someone with a BMI of 30 or greater is considered obese. More than two-thirds of American adults are considered overweight or obese.  Being overweight or obese increases the risk for further weight gain. Excess weight may lead to heart disease and diabetes.  Creating and following plans for healthy eating and physical activity may help you improve your health.  Weight control is part of healthy lifestyle and includes exercise, emotional health, and healthy eating habits. Careful eating habits lifelong are the mainstay of weight control. Though there are significant health benefits from weight loss, long-term weight loss with diet alone may be very difficult to achieve- studies show long-term success with dietary management in less than 10% of people. Attaining a healthy weight may be especially difficult to achieve in those with severe obesity. In some cases, medications, devices and surgical management might be considered.  What can you do?  If you are overweight or obese and are interested in methods for weight loss, you should discuss this with your provider.     Consider reducing daily calorie intake by 500 calories.     Keep a food journal.     Avoiding skipping meals, consider cutting portions instead.    Diet combined with exercise helps maintain muscle while optimizing fat loss. Strength training is particularly important for building and maintaining muscle mass. Exercise helps reduce stress, increase energy, and improves fitness.  Increasing exercise without diet control, however, may not burn enough calories to loose weight.       Start walking three days a week 10-20 minutes at a time    Work towards walking thirty minutes five days a week     Eventually, increase the speed of your walking for 1-2 minutes at time    In addition, we recommend that you review healthy lifestyles and methods for weight loss available through the National Institutes of Health patient information sites:  http://win.niddk.nih.gov/publications/index.htm    And look into health and wellness programs that may be available through your health insurance provider, employer, local community center, or [employer].    Weight management plan: Patient was referred to their PCP to discuss a diet and exercise plan.    Your blood pressure was checked while you were in clinic today.  Please read the guidelines below about what these numbers mean and what you should do about them.  Your systolic blood pressure is the top number.  This is the pressure when the heart is pumping.  Your diastolic blood pressure is the bottom number.  This is the pressure in between beats.  If your systolic blood pressure is less than 120 and your diastolic blood pressure is less than 80, then your blood pressure is normal. There is nothing more that you need to do about it  If your systolic blood pressure is 120-139 or your diastolic blood pressure is 80-89, your blood pressure may be higher than it should be.  You should have your blood pressure re-checked within a year by a primary care provider.  If your systolic blood pressure is 140 or greater or your diastolic blood pressure is 90 or greater, you may have high blood pressure.  High blood pressure is treatable, but if left untreated over time it can put you at risk for heart attack, stroke, or kidney failure.  You should have your blood pressure re-checked by a primary care provider within the next four weeks.

## 2017-09-13 NOTE — PROGRESS NOTES
Sleep medicine follow-up visit note    Date of visit: September 13, 2017    Purpose of visit: follow-up of KOTA    History of present illness: Tremaine King  is a 47-year-old male who was previously diagnosed with moderate obstructive sleep apnea. He is currently being treated with the CPAP device with fixed pressure  setting a 12 cm of water. He had  his  CPAP device  replaced in 2015. He reports using the CPAP regularly during sleep. He has not had the supplies replaced in a long time. He uses nasal mask. With the CPAP there have not been any concerns of snoring or awakenings due to gasping for air or choking sensation. He reports positive benefits with good sleep quality with the CPAP use. During the workdays his bedtime is 11 PM and he wakes up at 5 AM with an alarm. During weekends his bed time is 11 PM and he wakes up between 7 to 7:30 AM spontaneously. He reports feeling refreshed most mornings and denies fatigue or excessive daytime sleepiness. He endorses and [place] sleepiness score of 0 out of 24. There have not been any  hospitalizations due to medical CVD or other medical condition since he was last seen at the sleep clinic. He denies  drowsiness while driving.    Compliance download:for the past month reveals that he has used the device for 30 out  of 30 days with an average daily usage of 6 hours and 28 minutes on the nights used. There was no significant air leak. Residual AHI was 1.0 per hour.    Current meds:  Current Outpatient Prescriptions   Medication Sig Dispense Refill     albuterol (PROAIR HFA) 108 (90 BASE) MCG/ACT Inhaler Inhale 2 puffs into the lungs every 6 hours as needed for shortness of breath / dyspnea 1 Inhaler 11     amoxicillin (AMOXIL) 500 MG capsule Please take a total of 2 grams (four 500 mg capsules) 30-60 minutes prior to dental procedure. 12 capsule 3     metoprolol (TOPROL-XL) 100 MG 24 hr tablet Take 1 tablet (100 mg) by mouth daily Must keep 8/29/16 appt for further  refills. 90 tablet 3     allopurinol (ZYLOPRIM) 300 MG tablet Take 1 tablet (300 mg) by mouth daily 90 tablet 3     lisinopril (PRINIVIL/ZESTRIL) 40 MG tablet Take 1 tablet (40 mg) by mouth daily 90 tablet 3     amLODIPine (NORVASC) 2.5 MG tablet TAKE 3 TABLETS BY MOUTH DAILY 270 tablet 3     fluticasone (FLOVENT HFA) 220 MCG/ACT Inhaler Inhale 2 puffs into the lungs 2 times daily 2 puffs 1 Inhaler 11     order for DME Equipment being ordered: CPAP  Patient Tremaine King was set up at [facility] on September 14, 2015. Patient received a [employer] DreamStation Auto CPAP Auto. Pressures were set at Auto 12 - 12 cm H2O.   Patient s ramp is 9 cm H2O for Auto and FLEX/EPR is A Flex.  Patient received a [employer] Mask name: BLUE GEL NASAL  Nasal mask Size Medium, heated tubing and heated humidifier.  Patient is not enrolled in the STM Program and does not need to meet compliance. Samina Arora       aspirin 325 MG tablet Take  by mouth daily.       IBUPROFEN PO Take 400 mg by mouth 2 times daily as needed.         Past medical history:  Past Medical History:   Diagnosis Date     ASD (atrial septal defect)     s/p repair     Patient Active Problem List   Diagnosis     History of repair of mitral valve     CARDIOVASCULAR SCREENING; LDL GOAL LESS THAN 130     ASD (atrial septal defect)     Essential hypertension with goal blood pressure less than 140/90     Idiopathic gout, unspecified chronicity, unspecified site     BMI 35.0-35.9,adult     KOTA (obstructive sleep apnea)     Past surgical history:  Past Surgical History:   Procedure Laterality Date     REPAIR ATRIAL SEPTAL DEFECT  AGE 3     REPAIR VALVE MITRAL  AGE 3 AND JUNE 2005    annuloplasty ring and MVR in June 2005     REPAIR VALVE MITRAL       Allergies:No Known Allergies     Social history:  Social History     Social History     Marital status:      Spouse name: N/A     Number of children: N/A     Years of education: N/A     Occupational  "History     Not on file.     Social History Main Topics     Smoking status: Former Smoker     Types: Cigars     Smokeless tobacco: Never Used      Comment: quit smoking cigarettes and cigars at ~ age 22 (was light smoker for about 4 years))     Alcohol use Yes      Comment: 1-2/day on weekdays, 2-3/day on wknds     Drug use: No     Sexual activity: Yes     Partners: Female     Birth control/ protection: Male Surgical     Other Topics Concern     Parent/Sibling W/ Cabg, Mi Or Angioplasty Before 65f 55m? No     Social History Narrative      Family history:  Family History   Problem Relation Age of Onset     DIABETES Mother      onset past age 70     Physical exam:  /87  Pulse 82  Resp 18  Ht 1.803 m (5' 11\")  Wt 116.6 kg (257 lb)  SpO2 99%  BMI 35.84 kg/m2  General appearance:  in no apparent distress  Pt is dressed casually, cooperative with good eye contact.   Speech is spontaneous with regular rate and volume.   Mood: euthymic; affect congruent with full range and intensity.   Sensorium: awake, alert and oriented to person, place, time, and situation.    Assessment/Plan:  1. Moderate obstructive sleep apnea: patient reports adequate compliance with  CPAP device and continues to report positive benefits with the device use. Based in the compliance measures KOTA appears to be adequately controlled with CPAP at the current pressure settings. I have  provided him with a prescription for renewal of all the CPAP supplies. Recommended him to continue using the CPAP device regularly during sleep and instructed him to get the supplies for the device regularly replaced.    We discussed weight management with diet and exercise. He is following up with his primary care provider for weight management.    2. Chronic insufficient sleep: He reports insufficient sleep duration on work nights. we discussed the consequences of chronic sleep deprivation and encouraged him to increase the duration of sleep to  at least 7 " "hours to 8 hours of sleep per night. We discussed about slowly and gradually advancing his bedtime   from 11 PM to at 10 PM.    He was instructed to avoid driving/ operating heavy machinery, if drowsy or sleepy.    3. Hypertension:  he is on Toprol, Norvasc and lisinopril and  reports being compliant with his medications. His blood pressure was high today he was recommended to monitor his blood pressure,  a log of the blood pressure readings and follow up with his primary care provider for optimizing the management of the hypertension.    Plan is to follow up at sleep clinic  in one year sooner there any concerns.    Chart documentation done in part with Dragon Voice recognition Software. Although reviewed after completion, some word and grammatical error may remain.    \"I spent a total of 25 minutes  face to face with Tremaine King during today's office visit. Over 50% of this time was spent counseling the patient and coordinating care regarding CPAP, increasing sleep duration and hypertension .\"       Gokul Greer MD   of Medicine,  Division of Pulmonary/Sleep Medicine  [facility].      "

## 2017-09-13 NOTE — MR AVS SNAPSHOT
After Visit Summary   9/13/2017    Tremaine King    MRN: [id number]           Patient Information     Date Of Birth          1970        Visit Information        Provider Department      9/13/2017 1:00 PM Gokul Greer MD [facility] [place], Sleep Study        Today's Diagnoses     KOTA on CPAP    -  1      Care Instructions      Your BMI is Body mass index is 35.84 kg/(m^2).  Weight management is a personal decision.  If you are interested in exploring weight loss strategies, the following discussion covers the approaches that may be successful. Body mass index (BMI) is one way to tell whether you are at a healthy weight, overweight, or obese. It measures your weight in relation to your height.  A BMI of 18.5 to 24.9 is in the healthy range. A person with a BMI of 25 to 29.9 is considered overweight, and someone with a BMI of 30 or greater is considered obese. More than two-thirds of American adults are considered overweight or obese.  Being overweight or obese increases the risk for further weight gain. Excess weight may lead to heart disease and diabetes.  Creating and following plans for healthy eating and physical activity may help you improve your health.  Weight control is part of healthy lifestyle and includes exercise, emotional health, and healthy eating habits. Careful eating habits lifelong are the mainstay of weight control. Though there are significant health benefits from weight loss, long-term weight loss with diet alone may be very difficult to achieve- studies show long-term success with dietary management in less than 10% of people. Attaining a healthy weight may be especially difficult to achieve in those with severe obesity. In some cases, medications, devices and surgical management might be considered.  What can you do?  If you are overweight or obese and are interested in methods for weight loss, you should discuss this with your provider.     Consider  reducing daily calorie intake by 500 calories.     Keep a food journal.     Avoiding skipping meals, consider cutting portions instead.    Diet combined with exercise helps maintain muscle while optimizing fat loss. Strength training is particularly important for building and maintaining muscle mass. Exercise helps reduce stress, increase energy, and improves fitness. Increasing exercise without diet control, however, may not burn enough calories to loose weight.       Start walking three days a week 10-20 minutes at a time    Work towards walking thirty minutes five days a week     Eventually, increase the speed of your walking for 1-2 minutes at time    In addition, we recommend that you review healthy lifestyles and methods for weight loss available through the National Institutes of Health patient information sites:  http://win.niddk.nih.gov/publications/index.htm    And look into health and wellness programs that may be available through your health insurance provider, employer, local community center, or [employer]celle club.    Weight management plan: Patient was referred to their PCP to discuss a diet and exercise plan.    Your blood pressure was checked while you were in clinic today.  Please read the guidelines below about what these numbers mean and what you should do about them.  Your systolic blood pressure is the top number.  This is the pressure when the heart is pumping.  Your diastolic blood pressure is the bottom number.  This is the pressure in between beats.  If your systolic blood pressure is less than 120 and your diastolic blood pressure is less than 80, then your blood pressure is normal. There is nothing more that you need to do about it  If your systolic blood pressure is 120-139 or your diastolic blood pressure is 80-89, your blood pressure may be higher than it should be.  You should have your blood pressure re-checked within a year by a primary care provider.  If your systolic blood pressure is  140 or greater or your diastolic blood pressure is 90 or greater, you may have high blood pressure.  High blood pressure is treatable, but if left untreated over time it can put you at risk for heart attack, stroke, or kidney failure.  You should have your blood pressure re-checked by a primary care provider within the next four weeks.            Follow-ups after your visit        Follow-up notes from your care team     Return in about 1 year (around 9/13/2018).      Your next 10 appointments already scheduled     Nov 01, 2017  8:00 AM CDT   Ech Complete with [id number]   [facility] ([facility])    [address] [place] [id number] [phone]           1. Please bring or wear a comfortable two-piece outfit. 2. You may eat, drink and take your normal medicines. 3. For any questions that cannot be answered, please contact the ordering physician            Nov 08, 2017  7:30 AM CST   (Arrive by 7:15 AM)   Return Visit with Tim Gotti MD   [facility] ([facility])    [address] [place] [id number] [phone]              Who to contact     If you have questions or need follow up information about today's clinic visit or your schedule please [facility] directly at [phone].  Normal or non-critical lab and imaging results will be communicated to you by MyChart, letter or phone within 4 business days after the clinic has received the results. If you do not hear from us within 7 days, please contact the clinic through MyChart or phone. If you have a critical or abnormal lab result, we will notify you by phone as soon as possible.  Submit refill requests through [employer] or call your pharmacy and they will forward the refill request to us. Please allow 3 business days for your refill to be completed.          Additional Information About Your Visit        [employer]  "Information     [employer] gives you secure access to your electronic health record. If you see a primary care provider, you can also send messages to your care team and make appointments. If you have questions, please call your primary care clinic.  If you do not have a primary care provider, please call [phone] and they will assist you.        Care EveryWhere ID     This is your Care EveryWhere ID. This could be used by other organizations to access your [facility] medical records  [id number]        Your Vitals Were     Pulse Respirations Height Pulse Oximetry BMI (Body Mass Index)       82 18 1.803 m (5' 11\") 99% 35.84 kg/m2        Blood Pressure from Last 3 Encounters:   09/13/17 161/87   03/17/17 155/88   08/29/16 120/71    Weight from Last 3 Encounters:   09/13/17 116.6 kg (257 lb)   03/17/17 114.4 kg (252 lb 4.8 oz)   08/29/16 106.7 kg (235 lb 4.8 oz)              We Performed the Following     Comprehensive DME        Primary Care Provider Office Phone # Fax #    Samy Marcelino [phone]       [address] [facility] [address]  [facility] [address]        Equal Access to Services     ALISA STOLL : Hadii aad ku hadasho Soomaali, waaxda luqadaha, qaybta kaalmada adeegyada, waxay idiin haydarienn kelli khan laed melvin. So [facility] [phone].    ATENCIÓN: Si habla español, tiene a aguilar disposición servicios gratuitos de asistencia lingüística. Llame al [phone].    We comply with applicable federal civil rights laws and [place] laws. We do not discriminate on the basis of race, color, national origin, age, disability sex, sexual orientation or gender identity.            Thank you!     Thank you for choosing [facility]EP [facility]  for your care. Our goal is always to provide you with excellent care. Hearing back from our patients is one way we can continue to improve our services. Please take a few minutes to complete the written survey that you may receive in the mail after your " visit with us. Thank you!             Your Updated Medication List - Protect others around you: Learn how to safely use, store and throw away your medicines at www.disposemymeds.org.          This list is accurate as of: 9/13/17 11:59 PM.  Always use your most recent med list.                   Brand Name Dispense Instructions for use Diagnosis    albuterol 108 (90 BASE) MCG/ACT Inhaler    PROAIR HFA    1 Inhaler    Inhale 2 puffs into the lungs every 6 hours as needed for shortness of breath / dyspnea    Mild persistent asthma without complication       allopurinol 300 MG tablet    ZYLOPRIM    90 tablet    Take 1 tablet (300 mg) by mouth daily        amLODIPine 2.5 MG tablet    NORVASC    270 tablet    TAKE 3 TABLETS BY MOUTH DAILY    Essential hypertension with goal blood pressure less than 140/90       amoxicillin 500 MG capsule    AMOXIL    12 capsule    Please take a total of 2 grams (four 500 mg capsules) 30-60 minutes prior to dental procedure.        aspirin 325 MG tablet      Take  by mouth daily.        fluticasone 220 MCG/ACT Inhaler    FLOVENT HFA    1 Inhaler    Inhale 2 puffs into the lungs 2 times daily 2 puffs    Mild persistent asthma without complication       IBUPROFEN PO      Take 400 mg by mouth 2 times daily as needed.        lisinopril 40 MG tablet    PRINIVIL/ZESTRIL    90 tablet    Take 1 tablet (40 mg) by mouth daily    Essential hypertension with goal blood pressure less than 140/90       metoprolol 100 MG 24 hr tablet    TOPROL-XL    90 tablet    Take 1 tablet (100 mg) by mouth daily Must keep 8/29/16 appt for further refills.    Essential hypertension with goal blood pressure less than 140/90       order for DME      Equipment being ordered: CPAP Patient Tremaine King was set up at [facility] on September 14, 2015. Patient received a [employer] DreamStation Auto CPAP Auto. Pressures were set at Auto 12 - 12 cm H2O.   Patient?s ramp is 9 cm H2O for Auto and FLEX/EPR is A Flex.   Patient received a [employer] Mask name: BLUE GEL NASAL  Nasal mask Size Medium, heated tubing and heated humidifier.  Patient is not enrolled in the STM Program and does not need to meet compliance. Samina Arora

## 2017-09-13 NOTE — NURSING NOTE
"    Chief Complaint   Patient presents with     CPAP Follow Up       Initial /87  Pulse 82  Resp 18  Ht 1.803 m (5' 11\")  Wt 116.6 kg (257 lb)  SpO2 99%  BMI 35.84 kg/m2 Estimated body mass index is 35.84 kg/(m^2) as calculated from the following:    Height as of this encounter: 1.803 m (5' 11\").    Weight as of this encounter: 116.6 kg (257 lb).    Neck circumference: 17.5 inches / 44.5 centimeters.    Medication Reconciliation: complete        Homa Knapp CMA     "

## 2017-09-18 DIAGNOSIS — R73.9 HYPERGLYCEMIA: Primary | ICD-10-CM

## 2017-09-18 NOTE — PROGRESS NOTES
Shree Penaloza: Your recent results are normal except slightly elevated glucose. Recommend fasting lab only recheck glucose and A1c in 2 weeks. Contact if questions; nice to see you!     Samy

## 2017-11-01 ENCOUNTER — RADIANT APPOINTMENT (OUTPATIENT)
Dept: CARDIOLOGY | Facility: CLINIC | Age: 47
End: 2017-11-01

## 2017-11-01 DIAGNOSIS — Z87.74 H/O ATRIAL SEPTAL DEFECT REPAIR: ICD-10-CM

## 2017-11-01 DIAGNOSIS — Z95.2 S/P MVR (MITRAL VALVE REPLACEMENT): ICD-10-CM

## 2017-11-06 ENCOUNTER — PRE VISIT (OUTPATIENT)
Dept: CARDIOLOGY | Facility: CLINIC | Age: 47
End: 2017-11-06

## 2017-11-08 ENCOUNTER — OFFICE VISIT (OUTPATIENT)
Dept: CARDIOLOGY | Facility: CLINIC | Age: 47
End: 2017-11-08
Attending: INTERNAL MEDICINE
Payer: COMMERCIAL

## 2017-11-08 VITALS
HEIGHT: 71 IN | WEIGHT: 256.8 LBS | BODY MASS INDEX: 35.95 KG/M2 | SYSTOLIC BLOOD PRESSURE: 149 MMHG | HEART RATE: 83 BPM | OXYGEN SATURATION: 99 % | DIASTOLIC BLOOD PRESSURE: 94 MMHG

## 2017-11-08 DIAGNOSIS — Z87.74 H/O ATRIAL SEPTAL DEFECT REPAIR: ICD-10-CM

## 2017-11-08 DIAGNOSIS — Z98.890 S/P MITRAL VALVE REPAIR: Primary | ICD-10-CM

## 2017-11-08 PROCEDURE — 99212 OFFICE O/P EST SF 10 MIN: CPT | Mod: ZF

## 2017-11-08 PROCEDURE — 99214 OFFICE O/P EST MOD 30 MIN: CPT | Mod: ZP | Performed by: INTERNAL MEDICINE

## 2017-11-08 ASSESSMENT — PAIN SCALES - GENERAL: PAINLEVEL: NO PAIN (0)

## 2017-11-08 NOTE — MR AVS SNAPSHOT
After Visit Summary   11/8/2017    Tremaine King    MRN: [id number]           Patient Information     Date Of Birth          1970        Visit Information        Provider Department      11/8/2017 7:30 AM Tim Gotti MD [facility]        Care Instructions    You were seen today in the Cardiovascular Clinic at the [facility].     Cardiology Providers you saw during your visit: Dr Tim Gotti    Diagnosis: History of mitral valve regurgitation and s/p ASD repair    Results: Dr Gotti reviewed the results of your echo with you in clinic today    Recommendations:   1.  No changes at this time.    Follow-up: Follow up with Dr Gotti in one year with an echo       For emergencies call 911.    For any scheduling needs or nursing related questions, please call [phone].    Thank you for your visit today! If you have questions or concerns about today's visit, please call me.      Roberth Young RN  RN Care Coordinator  [facility]  [phone]    Press option 1 for the University and then 3 for nursing related questions                  Follow-ups after your visit        Who to contact     If you have questions or need follow up information about today's clinic visit or your schedule please contact [facility] directly at [phone].  Normal or non-critical lab and imaging results will be communicated to you by MyChart, letter or phone within 4 business days after the clinic has received the results. If you do not hear from us within 7 days, please contact the clinic through MyChart or phone. If you have a critical or abnormal lab result, we will notify you by phone as soon as possible.  Submit refill requests through [employer] or call your pharmacy and they will forward the refill request to us. Please allow 3 business days for your refill to be completed.          Additional Information About Your Visit        MyChart Information      [employer] gives you secure access to your electronic health record. If you see a primary care provider, you can also send messages to your care team and make appointments. If you have questions, please call your primary care clinic.  If you do not have a primary care provider, please call [phone] and they will assist you.        Care EveryWhere ID     This is your Care EveryWhere ID. This could be used by other organizations to access your [facility] medical records  [id number]        Your Vitals Were     Pulse Height Pulse Oximetry BMI (Body Mass Index)          83 1.803 m (5' 11\") 99% 35.82 kg/m2         Blood Pressure from Last 3 Encounters:   11/08/17 (!) 149/94   09/13/17 161/87   03/17/17 155/88    Weight from Last 3 Encounters:   11/08/17 116.5 kg (256 lb 12.8 oz)   09/13/17 116.6 kg (257 lb)   03/17/17 114.4 kg (252 lb 4.8 oz)              Today, you had the following     No orders found for display       Primary Care Provider Office Phone # Fax #    Samy Marcelino [phone]       [address] [facility] [address]  [facility] [address]        Equal Access to Services     ALISA STOLL : Hadii aad ku hadasho Soomaali, waaxda luqadaha, qaybta kaalmada adeegyada, waxay srikanth haydarienn kelli khan laed . So [facility] [place] [phone].    ATENCIÓN: Si habla español, tiene a aguilar disposición servicios gratuitos de asistencia lingüística. Llame al [phone].    We comply with applicable federal civil rights laws and [place] laws. We do not discriminate on the basis of race, color, national origin, age, disability, sex, sexual orientation, or gender identity.            Thank you!     Thank you for choosing [facility]  for your care. Our goal is always to provide you with excellent care. Hearing back from our patients is one way we can continue to improve our services. Please take a few minutes to complete the written survey that you may receive in the mail after your visit with us. Thank " you!             Your Updated Medication List - Protect others around you: Learn how to safely use, store and throw away your medicines at www.disposemymeds.org.          This list is accurate as of: 11/8/17  8:10 AM.  Always use your most recent med list.                   Brand Name Dispense Instructions for use Diagnosis    albuterol 108 (90 BASE) MCG/ACT Inhaler    PROAIR HFA    1 Inhaler    Inhale 2 puffs into the lungs every 6 hours as needed for shortness of breath / dyspnea    Mild persistent asthma without complication       allopurinol 300 MG tablet    ZYLOPRIM    90 tablet    Take 1 tablet (300 mg) by mouth daily        amLODIPine 2.5 MG tablet    NORVASC    270 tablet    TAKE 3 TABLETS BY MOUTH DAILY    Essential hypertension with goal blood pressure less than 140/90       amoxicillin 500 MG capsule    AMOXIL    12 capsule    Please take a total of 2 grams (four 500 mg capsules) 30-60 minutes prior to dental procedure.        aspirin 325 MG tablet      Take  by mouth daily.        fluticasone 220 MCG/ACT Inhaler    FLOVENT HFA    1 Inhaler    Inhale 2 puffs into the lungs 2 times daily 2 puffs    Mild persistent asthma without complication       IBUPROFEN PO      Take 400 mg by mouth 2 times daily as needed.        lisinopril 40 MG tablet    PRINIVIL/ZESTRIL    90 tablet    Take 1 tablet (40 mg) by mouth daily    Essential hypertension with goal blood pressure less than 140/90       metoprolol 100 MG 24 hr tablet    TOPROL-XL    90 tablet    Take 1 tablet (100 mg) by mouth daily Must keep 8/29/16 appt for further refills.    Essential hypertension with goal blood pressure less than 140/90       order for DME      Equipment being ordered: CPAP Patient Tremaine SHEETS Christine was set up at [facility] on September 14, 2015. Patient received a [employer] RespirCarbon Digital DreamStation Auto CPAP Auto. Pressures were set at Auto 12 - 12 cm H2O.   Patient?s ramp is 9 cm H2O for Auto and FLEX/EPR is A Flex.  Patient received a  [employer] Mask name: BLUE GEL NASAL  Nasal mask Size Medium, heated tubing and heated humidifier.  Patient is not enrolled in the STM Program and does not need to meet compliance. Samina Arora

## 2017-11-08 NOTE — NURSING NOTE
Cardiac Testing: Patient given instructions regarding  echocardiogram . Discussed purpose, preparation, procedure and when to expect results reported back to the patient. Patient demonstrated understanding of this information and agreed to call with further questions or concerns.    Med Reconcile: Reviewed and verified all current medications with the patient. The updated medication list was printed and given to the patient.    Return Appointment: Follow up with Dr Gotti in one year with an echo prior.  Patient given instructions regarding scheduling next clinic visit. Patient demonstrated understanding of this information and agreed to call with further questions or concerns.    Patient stated he understood all health information given and agreed to call with further questions or concerns.    Roberth Young, RN  RN Care Coordinator  [facility]  [phone]

## 2017-11-08 NOTE — PROGRESS NOTES
2017            Samy Marcelino MD    [facility]   [address]   [place]  [address]       RE:  Tremaine King   MRN:  [id number]   :  1970      Dear Dr. Marcelino:      It was a pleasure participating in the care of your patient, Mr. Tremaine King.  As you know, he is a 47-year-old gentleman who I see today for a history of mitral valve repair and ASD repair.      His past medical history is significant for the followin.  Migraine headaches.   2.  Gout.   3.  Seasonal allergies with a verbal history of mild asthma.   4.  Meniscus tear of the knee.   5.  History of morbid obesity with weight loss via diet and exercise.   6.  Sleep apnea, treated with CPAP since .      His cardiac history is significant for having had ASD repair along with mitral valve repair at age 3.  He had subsequent mitral insufficiency and required mitral valve repair with an annuloplasty ring in  by Dr. Hernandez.  His postoperative course was complicated by a high resting pulse and Dr. Diehl performed an EP study which did not induce significant arrhythmias and he was diagnosed as having inappropriate sinus tachycardia with first degree AV block and was medically managed.      I last saw him 2016.  At that time, he was doing adequately.  He presents today for continuing care.      Since our last visit, he has continued to do well.  He ran a 5K race in October without symptoms.  He walks 15-20 minutes a day and can walk up and down 2 flights of stairs without a problem.  He has just started an exercise biking program to increase his level of activity and lose weight and he feels well doing this.  His blood pressures at home have been well controlled in the 130 mmHg range.  He denies any new chest pain, problems breathing, PND, orthopnea, edema, palpitations, syncope or near-syncope.  He feels that his exertional capacity has not changed and he has no other complaints.      SOCIAL  HISTORY:  He is a  and continues to enjoy watching educational YouTube videos and reading a balance of what he calls depressing nonfiction and uplifting humerus science fiction fantasy.      CURRENT MEDICATIONS:     1.  Metoprolol 100 mg a day.   2.  Lisinopril 40 mg a day.   3.  Amlodipine 7.5 mg a day.   4.  Aspirin 325 mg a day.      PHYSICAL EXAMINATION:     VITAL SIGNS:  His blood pressure is 140/90 with a pulse of 83.  His weight is 256 pounds.   NECK:  Reveals no obvious jugular venous distention.   LUNGS:  Clear to auscultation.  Respiratory effort is normal.   CARDIAC:  Reveals a regular rate and rhythm.  There is a soft 2/6 systolic ejection murmur in the aortic position, early peaking in nature.  There is a barely audible 1/6 diastolic murmur present.   ABDOMEN:  Belly soft, nontender.   EXTREMITIES:  Without gross edema.      His echocardiogram 11/01/2017 reveals normal ejection fraction of 60-65%.  Mitral gradient is 9 mmHg.  No change from prior normal PA pressure.        IMPRESSION:      Tremaine is a 47-year-old gentleman whose cardiac history is significant for having an atrial septal defect and mitral valve repair at age 3 with a mitral annuloplasty ring in 2005 for mitral insufficiency.  He had a formal diagnosis of inappropriate sinus tachycardia with first-degree atrioventricular block and is medically managed with Toprol.  He has several active cardiac issues:     1.  Mitral valve repair.      From a clinical standpoint, he is exertionally unlimited and is able to run a 5K race without symptoms.  He can run up and down 2 flights of stairs without a problem and is currently exercising on his biking program in an unlimited fashion.  His echocardiogram on 11/01/2017 reveals that his mitral gradient of 9 mmHg has not changed over the years.  We will continue to monitor him clinically.     2.  Hypertension.      His blood pressures have been running in the 130 mmHg range at home and we  will continue to monitor this as well.        PLAN:     1.  He will continue to monitor his blood pressures at home, and if he finds that his blood pressures begin to inch above 140 mmHg on a regular basis, then he will increase his amlodipine from 7.5 to 10 mg a day.  Otherwise, he can follow up with me with a repeat echo in 1 year or earlier if needed.      Once again, it was a pleasure participating in the care of your patient, Mr. Tremaine Horn.  Please feel free to contact me at any time if there are any questions regarding his care in the future.         Sincerely,      JC MCLAIN MD             D: 2017 08:14   T: 2017 09:24   MT: CASANDRA      Name:     TREMAINE HORN   MRN:      [id number]        Account:      [id number]   :      1970      Document: [id number]       cc: Samy Marcelino MD

## 2017-11-08 NOTE — LETTER
2017      RE: Tremaine King  [address] [facility] [address]     2017            Samy Marcelino MD    [facility]   [address]   [place]  [address]       RE:  Tremaine King   MRN:  [id number]   :  1970      Dear Dr. Marcelino:      It was a pleasure participating in the care of your patient, Mr. Tremaine King.  As you know, he is a 47-year-old gentleman who I see today for a history of mitral valve repair and ASD repair.      His past medical history is significant for the followin.  Migraine headaches.   2.  Gout.   3.  Seasonal allergies with a verbal history of mild asthma.   4.  Meniscus tear of the knee.   5.  History of morbid obesity with weight loss via diet and exercise.   6.  Sleep apnea, treated with CPAP since .      His cardiac history is significant for having had ASD repair along with mitral valve repair at age 3.  He had subsequent mitral insufficiency and required mitral valve repair with an annuloplasty ring in  by Dr. Hernandez.  His postoperative course was complicated by a high resting pulse and Dr. Diehl performed an EP study which did not induce significant arrhythmias and he was diagnosed as having inappropriate sinus tachycardia with first degree AV block and was medically managed.      I last saw him 2016.  At that time, he was doing adequately.  He presents today for continuing care.      Since our last visit, he has continued to do well.  He ran a 5K race in October without symptoms.  He walks 15-20 minutes a day and can walk up and down 2 flights of stairs without a problem.  He has just started an exercise biking program to increase his level of activity and lose weight and he feels well doing this.  His blood pressures at home have been well controlled in the 130 mmHg range.  He denies any new chest pain, problems breathing, PND, orthopnea, edema, palpitations, syncope or near-syncope.  He feels that his  exertional capacity has not changed and he has no other complaints.      SOCIAL HISTORY:  He is a  and continues to enjoy watching educational YouTube videos and reading a balance of what he calls depressing nonfiction and uplifting humerus science fiction fantasy.      CURRENT MEDICATIONS:   1.  Metoprolol 100 mg a day.   2.  Lisinopril 40 mg a day.   3.  Amlodipine 7.5 mg a day.   4.  Aspirin 325 mg a day.      PHYSICAL EXAMINATION:   VITAL SIGNS:  His blood pressure is 140/90 with a pulse of 83.  His weight is 256 pounds.   NECK:  Reveals no obvious jugular venous distention.   LUNGS:  Clear to auscultation.  Respiratory effort is normal.   CARDIAC:  Reveals a regular rate and rhythm.  There is a soft 2/6 systolic ejection murmur in the aortic position, early peaking in nature.  There is a barely audible 1/6 diastolic murmur present.   ABDOMEN:  Belly soft, nontender.   EXTREMITIES:  Without gross edema.      His echocardiogram 11/01/2017 reveals normal ejection fraction of 60-65%.  Mitral gradient is 9 mmHg.  No change from prior normal PA pressure.      IMPRESSION:  Tremaine is a 47-year-old gentleman whose cardiac history is significant for having an atrial septal defect and mitral valve repair at age 3 with a mitral annuloplasty ring in 2005 for mitral insufficiency.  He had a formal diagnosis of inappropriate sinus tachycardia with first-degree atrioventricular block and is medically managed with Toprol.  He has several active cardiac issues:   1.  Mitral valve repair.  From a clinical standpoint, he is exertionally unlimited and is able to run a 5K race without symptoms.  He can run up and down 2 flights of stairs without a problem and is currently exercising on his biking program in an unlimited fashion.  His echocardiogram on 11/01/2017 reveals that his mitral gradient of 9 mmHg has not changed over the years.  We will continue to monitor him clinically.   2.  Hypertension.  His blood pressures  have been running in the 130 mmHg range at home and we will continue to monitor this as well.      PLAN:   1.  He will continue to monitor his blood pressures at home, and if he finds that his blood pressures begin to inch above 140 mmHg on a regular basis, then he will increase his amlodipine from 7.5 to 10 mg a day.  Otherwise, he can follow up with me with a repeat echo in 1 year or earlier if needed.      Once again, it was a pleasure participating in the care of your patient, Mr. Tremaine Horn.  Please feel free to contact me at any time if there are any questions regarding his care in the future.         Sincerely,      JC MCLAIN MD        cc: Samy Marcelino MD         D: 2017 08:14   T: 2017 09:24   MT: TS      Name:     TREMAINE HORN   MRN:      [id number]        Account:      [id number]   :      1970      Document: [id number]

## 2017-11-08 NOTE — PATIENT INSTRUCTIONS
You were seen today in the Cardiovascular Clinic at the [facility].     Cardiology Providers you saw during your visit: Dr Tim Gotti    Diagnosis: History of mitral valve regurgitation and s/p ASD repair    Results: Dr Gotti reviewed the results of your echo with you in clinic today    Recommendations:   1.  No changes at this time.    Follow-up: Follow up with Dr Gotti in one year with an echo       For emergencies call 911.    For any scheduling needs or nursing related questions, please call [phone].    Thank you for your visit today! If you have questions or concerns about today's visit, please call me.      Roberth Young RN  RN Care Coordinator  [facility]  [phone]    Press option 1 for the [place] and then 3 for nursing related questions

## 2017-11-08 NOTE — LETTER
11/8/2017      RE: Tremaine SHEETS Christine  [address]  [facility] [address]       Dear Colleague,    Thank you for the opportunity to participate in the care of your patient, Tremaine King, at the [facility]. Please see a copy of my visit note below.    See dictation [id number]      Please do not hesitate to contact me if you have any questions/concerns.     Sincerely,     Tim Gotti MD

## 2017-11-08 NOTE — NURSING NOTE
Chief Complaint   Patient presents with     Follow Up For     heart problem--45 year old male with history of mitral valve regurgitation and ASD s/p reair and hypertension presenting for follow up     Vitals were taken and medications were reconciled.   Marilou Warner,UMANGA  7:27 AM

## 2018-04-27 ENCOUNTER — TELEPHONE (OUTPATIENT)
Dept: FAMILY MEDICINE | Facility: CLINIC | Age: 48
End: 2018-04-27

## 2018-04-27 NOTE — LETTER
"[address] [id number] [phone]          April 27, 2018    Tremaine King                                                                                                                     [address] [facility] [address]      Dear Tremaine    To help your primary care provider and care team understand how well your asthma is in control. We would like you to complete the attached ACT form that consists of 7 questions and 2 follow up questions regarding any hospitalization. Please mail the completed form back in the enclosed self-addressed stamped envelope.     If you have mychart, you can also send your responses in a \"message my care team\" message.        If you have questions, please contact us at [phone].    Sincerely,      Guero Chappell    "

## 2018-04-27 NOTE — TELEPHONE ENCOUNTER
Panel Management Review      Patient has the following on his problem list:     Asthma review     ACT Total Scores 3/17/2017   ACT TOTAL SCORE -   ASTHMA ER VISITS -   ASTHMA HOSPITALIZATIONS -   ACT TOTAL SCORE (Goal Greater than or Equal to 20) 24   In the past 12 months, how many times did you visit the emergency room for your asthma without being admitted to the hospital? 0   In the past 12 months, how many times were you hospitalized overnight because of your asthma? 0      1. Is Asthma diagnosis on the Problem List? Yes    2. Is Asthma listed on Health Maintenance? Yes    3. Patient is due for:  ACT      Composite cancer screening  Chart review shows that this patient is due/due soon for the following None  Summary:    Patient is due/failing the following:   ACT    Action needed:   Patient needs to do ACT.    Type of outreach:    Sent letter.    Questions for provider review:    None                                                                                                                                    Guero Voss routed to Care Team .

## 2018-05-22 ENCOUNTER — TRANSFERRED RECORDS (OUTPATIENT)
Dept: HEALTH INFORMATION MANAGEMENT | Facility: CLINIC | Age: 48
End: 2018-05-22

## 2018-05-24 ENCOUNTER — TRANSFERRED RECORDS (OUTPATIENT)
Dept: HEALTH INFORMATION MANAGEMENT | Facility: CLINIC | Age: 48
End: 2018-05-24

## 2018-06-07 ENCOUNTER — TRANSFERRED RECORDS (OUTPATIENT)
Dept: HEALTH INFORMATION MANAGEMENT | Facility: CLINIC | Age: 48
End: 2018-06-07

## 2018-06-27 ASSESSMENT — ASTHMA QUESTIONNAIRES: ACT_TOTALSCORE: 25

## 2018-07-12 ENCOUNTER — TRANSFERRED RECORDS (OUTPATIENT)
Dept: HEALTH INFORMATION MANAGEMENT | Facility: CLINIC | Age: 48
End: 2018-07-12

## 2018-10-02 NOTE — PROGRESS NOTES
SUBJECTIVE:   CC: Tremaine King is an 48 year old male who presents for preventative health visit.     Physical   Annual:     Getting at least 3 servings of Calcium per day:  Yes    Bi-annual eye exam:  Yes    Dental care twice a year:  NO    Sleep apnea or symptoms of sleep apnea:  Sleep apnea    Diet:  Regular (no restrictions)    Frequency of exercise:  2-3 days/week    Duration of exercise:  15-30 minutes    Taking medications regularly:  Yes    Medication side effects:  None    Additional concerns today:  No    Vision  His vision is 20/20 and 20/100. He did not notice the vision change until he went in to get his license changed.     Cardiovascular  He gets an ECHO every 2 years. His Mitral valve was fixed with a ring. He has a high heart rate as a result. He denies chest pain.     Asthma  His asthma is under control. He uses his rescue inhaler occasionally.     Musculoskeletal  Patient went to Orthopedics as he over extended himself to kill a spider recently. He was disappointed as he was on week 5 of running. He was given PT exercises. He is walking 1-2 miles 3x a week. Patient would like to get back on his bike. He is doing meditation right now    GI  Patient denies bowel trouble.      He denies bladder trouble.       Today's PHQ-2 Score:   PHQ-2 ( 1999 Pfizer) 10/2/2018   Q1: Little interest or pleasure in doing things 0   Q2: Feeling down, depressed or hopeless 0   PHQ-2 Score 0   Q1: Little interest or pleasure in doing things Not at all   Q2: Feeling down, depressed or hopeless Not at all   PHQ-2 Score 0       Abuse: Current or Past(Physical, Sexual or Emotional)- No  Do you feel safe in your environment - Yes    Social History   Substance Use Topics     Smoking status: Former Smoker     Types: Cigars     Smokeless tobacco: Never Used      Comment: quit smoking cigarettes and cigars at ~ age 22 (was light smoker for about 4 years))     Alcohol use Yes      Comment: 1-2/day on weekdays, 2-3/day on wknds      Alcohol Use 10/2/2018   If you drink alcohol do you typically have greater than 3 drinks per day OR greater than 7 drinks per week? No   No flowsheet data found.    Last PSA: No results found for: PSA    Reviewed orders with patient. Reviewed health maintenance and updated orders accordingly - Yes  Labs reviewed in EPIC  BP Readings from Last 3 Encounters:   11/08/17 (!) 149/94   09/13/17 161/87   03/17/17 155/88    Wt Readings from Last 3 Encounters:   11/08/17 116.5 kg (256 lb 12.8 oz)   09/13/17 116.6 kg (257 lb)   03/17/17 114.4 kg (252 lb 4.8 oz)                  Patient Active Problem List   Diagnosis     History of repair of mitral valve     CARDIOVASCULAR SCREENING; LDL GOAL LESS THAN 130     ASD (atrial septal defect)     Essential hypertension with goal blood pressure less than 140/90     Idiopathic gout, unspecified chronicity, unspecified site     BMI 35.0-35.9,adult     KOTA (obstructive sleep apnea)     Lumbar disc herniation     Past Surgical History:   Procedure Laterality Date     REPAIR ATRIAL SEPTAL DEFECT  AGE 3     REPAIR VALVE MITRAL  AGE 3 AND JUNE 2005    annuloplasty ring and MVR in June 2005     REPAIR VALVE MITRAL         Social History   Substance Use Topics     Smoking status: Former Smoker     Types: Cigars     Smokeless tobacco: Never Used      Comment: quit smoking cigarettes and cigars at ~ age 22 (was light smoker for about 4 years))     Alcohol use Yes      Comment: 1-2/day on weekdays, 2-3/day on wknds     Family History   Problem Relation Age of Onset     Diabetes Mother      onset past age 70         Current Outpatient Prescriptions   Medication Sig Dispense Refill     albuterol (PROAIR HFA) 108 (90 BASE) MCG/ACT Inhaler Inhale 2 puffs into the lungs every 6 hours as needed for shortness of breath / dyspnea 1 Inhaler 11     allopurinol (ZYLOPRIM) 300 MG tablet Take 1 tablet (300 mg) by mouth daily 90 tablet 3     amLODIPine (NORVASC) 2.5 MG tablet TAKE 3 TABLETS BY MOUTH  DAILY 270 tablet 3     amoxicillin (AMOXIL) 500 MG capsule Please take a total of 2 grams (four 500 mg capsules) 30-60 minutes prior to dental procedure. 12 capsule 3     aspirin 325 MG tablet Take  by mouth daily.       fluticasone (FLOVENT HFA) 220 MCG/ACT Inhaler Inhale 2 puffs into the lungs 2 times daily 2 puffs 1 Inhaler 11     IBUPROFEN PO Take 400 mg by mouth 2 times daily as needed.         lisinopril (PRINIVIL/ZESTRIL) 40 MG tablet TAKE 1 TABLET (40 MG) BY MOUTH DAILY 90 tablet 3     metoprolol succinate (TOPROL-XL) 100 MG 24 hr tablet TAKE 1 TABLET (100 MG) BY MOUTH DAILY. MUST KEEP 8/29/16 APPT FOR FURTHER REFILLS. 90 tablet 3     order for DME Equipment being ordered: CPAP  Patient Tremaine King was set up at [place] on September 14, 2015. Patient received a [employer] DreamStation Auto CPAP Auto. Pressures were set at Auto 12 - 12 cm H2O.   Patient s ramp is 9 cm H2O for Auto and FLEX/EPR is A Flex.  Patient received a [employer] Respironics Mask name: BLUE GEL NASAL  Nasal mask Size Medium, heated tubing and heated humidifier.  Patient is not enrolled in the STM Program and does not need to meet compliance. Samina Arora       No Known Allergies    Reviewed and updated as needed this visit by clinical staff         Reviewed and updated as needed this visit by Provider        Past Medical History:   Diagnosis Date     ASD (atrial septal defect)     s/p repair     Lumbar disc herniation 05/2018    better after steroid inj and PT      Past Surgical History:   Procedure Laterality Date     REPAIR ATRIAL SEPTAL DEFECT  AGE 3     REPAIR VALVE MITRAL  AGE 3 AND JUNE 2005    annuloplasty ring and MVR in June 2005     REPAIR VALVE MITRAL         Review of Systems  Denies chest pain, bowel trouble, and bladder trouble. Remainder of ROS is negative unless otherwise noted above or in HPI.    This document serves as a record of the services and decisions personally performed and made by Samy Marcelino MD.  It was created on his behalf by Sunita Pike, a trained medical scribe. The creation of this document is based on the provider's statements to the medical scribe.  Sunita Pike 8:09 AM October 3, 2018    OBJECTIVE:   There were no vitals taken for this visit.    Physical Exam  GENERAL: healthy, alert and no distress  EYES: Eyes grossly normal to inspection, PERRL and conjunctivae and sclerae normal  HENT: ear canals and TM's normal, nose and mouth without ulcers or lesions  NECK: no adenopathy, no asymmetry, masses, or scars and thyroid normal to palpation  RESP: slight prolongation of expiratory phase, otherwise lungs clear to auscultation - no rales, rhonchi or wheezes  CV: slight quiet murmur, otherwise regular rate and rhythm, normal S1 S2, no S3 or S4, no click or rub, no peripheral edema and peripheral pulses strong, no bruits  ABDOMEN: soft, nontender, no hepatosplenomegaly, no masses and bowel sounds normal   (male): normal male genitalia without lesions or urethral discharge, no hernia  RECTAL: normal sphincter tone, no rectal masses, prostate normal size, smooth, nontender without nodules or masses  MS: no gross musculoskeletal defects noted, no edema  SKIN: no suspicious lesions or rashes  NEURO: fine tremor, otherwise normal strength and tone, mentation intact and speech normal  PSYCH: mentation appears normal, affect normal/bright    Diagnostic Test Results:  No results found for this or any previous visit (from the past 24 hour(s)).    ASSESSMENT/PLAN:   (Z00.00) Encounter for routine adult health examination without abnormal findings  (primary encounter diagnosis)  Comment: Patient is doing well. Routine physical and labs completed Today.   Plan: Comprehensive metabolic panel, Lipid panel         reflex to direct LDL Fasting, PSA, screen    (I10) Essential hypertension with goal blood pressure less than 140/90  Comment: <140/90, At goal.   Plan: amLODIPine (NORVASC) 2.5 MG tablet, lisinopril      "    (PRINIVIL/ZESTRIL) 40 MG tablet, metoprolol         succinate (TOPROL-XL) 100 MG 24 hr tablet,         Comprehensive metabolic panel, Lipid panel         reflex to direct LDL Fasting    (Z68.35) BMI 35.0-35.9,adult  Comment: Unchanged.   Plan: Will continue to monitor. Follow up as needed.     (G47.33) KOTA (obstructive sleep apnea)  Comment: Unchanged.   Plan: Will continue to monitor. Follow up as needed.     (M10.00) Idiopathic gout, unspecified chronicity, unspecified site  Comment: Controlled on Allopurinol.  Plan: allopurinol (ZYLOPRIM) 300 MG tablet, Uric acid        Continue on current medication. Follow up as needed.     (Z98.890) History of repair of mitral valve  Comment: Stable and unchanged since las visit. Controlled on Amoxicillin as needed. Patient continues to see Cardiology.   Plan: amoxicillin (AMOXIL) 500 MG capsule        Continue on Amoxicillin as needed. Continue to see Cardiology. Follow up as needed.     (J45.30) Mild persistent asthma without complication  Comment: Stable. Controlled on Albuterol and Fluticasone.  Plan: albuterol (PROAIR HFA) 108 (90 Base) MCG/ACT         inhaler, fluticasone (FLOVENT HFA) 220 MCG/ACT         Inhaler        Continue on current medications. Follow up as needed.     (Z23) Need for vaccination  Comment: Patient agrees to vaccine.   Plan: ZOSTER VACCINE RECOMBINANT ADJUVANTED IM NJX    Patient Instructions   Continue core strengthening for disc preventative maintenance.       COUNSELING:   Reviewed preventive health counseling, as reflected in patient instructions       Regular exercise       Vision screening    BP Readings from Last 1 Encounters:   11/08/17 (!) 149/94     Estimated body mass index is 35.82 kg/(m^2) as calculated from the following:    Height as of 11/8/17: 5' 11\" (1.803 m).    Weight as of 11/8/17: 256 lb 12.8 oz (116.5 kg).      Weight management plan: Discussed healthy diet and exercise guidelines and patient will follow up in 12 months " in clinic to re-evaluate.     reports that he has quit smoking. His smoking use included Cigars. He has never used smokeless tobacco.      The information in this document, created by the medical scribe for me, accurately reflects the services I personally performed and the decisions made by me. I have reviewed and approved this document for accuracy prior to leaving the patient care area.  October 3, 2018 9:06 AM    Samy Marcelino MD  [facility]

## 2018-10-03 ENCOUNTER — OFFICE VISIT (OUTPATIENT)
Dept: FAMILY MEDICINE | Facility: CLINIC | Age: 48
End: 2018-10-03
Payer: COMMERCIAL

## 2018-10-03 VITALS
HEART RATE: 68 BPM | DIASTOLIC BLOOD PRESSURE: 84 MMHG | OXYGEN SATURATION: 98 % | TEMPERATURE: 98 F | SYSTOLIC BLOOD PRESSURE: 132 MMHG

## 2018-10-03 DIAGNOSIS — I10 ESSENTIAL HYPERTENSION WITH GOAL BLOOD PRESSURE LESS THAN 140/90: ICD-10-CM

## 2018-10-03 DIAGNOSIS — Z00.00 ENCOUNTER FOR ROUTINE ADULT HEALTH EXAMINATION WITHOUT ABNORMAL FINDINGS: Primary | ICD-10-CM

## 2018-10-03 DIAGNOSIS — J45.30 MILD PERSISTENT ASTHMA WITHOUT COMPLICATION: ICD-10-CM

## 2018-10-03 DIAGNOSIS — M10.00 IDIOPATHIC GOUT, UNSPECIFIED CHRONICITY, UNSPECIFIED SITE: ICD-10-CM

## 2018-10-03 DIAGNOSIS — Z23 NEED FOR VACCINATION: ICD-10-CM

## 2018-10-03 DIAGNOSIS — G47.33 OSA (OBSTRUCTIVE SLEEP APNEA): ICD-10-CM

## 2018-10-03 DIAGNOSIS — Z98.890 HISTORY OF REPAIR OF MITRAL VALVE: ICD-10-CM

## 2018-10-03 PROBLEM — M51.26 LUMBAR DISC HERNIATION: Status: ACTIVE | Noted: 2018-05-01

## 2018-10-03 LAB
ALBUMIN SERPL-MCNC: 4.4 G/DL (ref 3.4–5)
ALP SERPL-CCNC: 73 U/L (ref 40–150)
ALT SERPL W P-5'-P-CCNC: 46 U/L (ref 0–70)
ANION GAP SERPL CALCULATED.3IONS-SCNC: 7 MMOL/L (ref 3–14)
AST SERPL W P-5'-P-CCNC: 39 U/L (ref 0–45)
BILIRUB SERPL-MCNC: 0.9 MG/DL (ref 0.2–1.3)
BUN SERPL-MCNC: 14 MG/DL (ref 7–30)
CALCIUM SERPL-MCNC: 9.6 MG/DL (ref 8.5–10.1)
CHLORIDE SERPL-SCNC: 101 MMOL/L (ref 94–109)
CHOLEST SERPL-MCNC: 196 MG/DL
CO2 SERPL-SCNC: 29 MMOL/L (ref 20–32)
CREAT SERPL-MCNC: 1.05 MG/DL (ref 0.66–1.25)
GFR SERPL CREATININE-BSD FRML MDRD: 75 ML/MIN/1.7M2
GLUCOSE SERPL-MCNC: 110 MG/DL (ref 70–99)
HDLC SERPL-MCNC: 76 MG/DL
LDLC SERPL CALC-MCNC: 104 MG/DL
NONHDLC SERPL-MCNC: 120 MG/DL
POTASSIUM SERPL-SCNC: 4.1 MMOL/L (ref 3.4–5.3)
PROT SERPL-MCNC: 7.8 G/DL (ref 6.8–8.8)
PSA SERPL-ACNC: 1.95 UG/L (ref 0–4)
SODIUM SERPL-SCNC: 137 MMOL/L (ref 133–144)
TRIGL SERPL-MCNC: 78 MG/DL
URATE SERPL-MCNC: 5.1 MG/DL (ref 3.5–7.2)

## 2018-10-03 PROCEDURE — 80061 LIPID PANEL: CPT | Performed by: FAMILY MEDICINE

## 2018-10-03 PROCEDURE — G0103 PSA SCREENING: HCPCS | Performed by: FAMILY MEDICINE

## 2018-10-03 PROCEDURE — 36415 COLL VENOUS BLD VENIPUNCTURE: CPT | Performed by: FAMILY MEDICINE

## 2018-10-03 PROCEDURE — 80053 COMPREHEN METABOLIC PANEL: CPT | Performed by: FAMILY MEDICINE

## 2018-10-03 PROCEDURE — 84550 ASSAY OF BLOOD/URIC ACID: CPT | Performed by: FAMILY MEDICINE

## 2018-10-03 PROCEDURE — 90750 HZV VACC RECOMBINANT IM: CPT | Performed by: FAMILY MEDICINE

## 2018-10-03 PROCEDURE — 99396 PREV VISIT EST AGE 40-64: CPT | Mod: 25 | Performed by: FAMILY MEDICINE

## 2018-10-03 PROCEDURE — 90471 IMMUNIZATION ADMIN: CPT | Performed by: FAMILY MEDICINE

## 2018-10-03 RX ORDER — FLUTICASONE PROPIONATE 220 UG/1
2 AEROSOL, METERED RESPIRATORY (INHALATION) 2 TIMES DAILY
Qty: 1 INHALER | Refills: 11 | Status: SHIPPED | OUTPATIENT
Start: 2018-10-03 | End: 2019-10-09

## 2018-10-03 RX ORDER — LISINOPRIL 40 MG/1
TABLET ORAL
Qty: 90 TABLET | Refills: 3 | Status: SHIPPED | OUTPATIENT
Start: 2018-10-03 | End: 2019-09-29

## 2018-10-03 RX ORDER — ALBUTEROL SULFATE 90 UG/1
2 AEROSOL, METERED RESPIRATORY (INHALATION) EVERY 6 HOURS PRN
Qty: 1 INHALER | Refills: 11 | Status: SHIPPED | OUTPATIENT
Start: 2018-10-03 | End: 2019-10-09

## 2018-10-03 RX ORDER — METOPROLOL SUCCINATE 100 MG/1
TABLET, EXTENDED RELEASE ORAL
Qty: 90 TABLET | Refills: 3 | COMMUNITY
Start: 2018-10-03 | End: 2019-09-29

## 2018-10-03 RX ORDER — ALLOPURINOL 300 MG/1
300 TABLET ORAL DAILY
Qty: 90 TABLET | Refills: 3 | Status: SHIPPED | OUTPATIENT
Start: 2018-10-03 | End: 2019-07-20

## 2018-10-03 RX ORDER — FLUTICASONE PROPIONATE 220 UG/1
2 AEROSOL, METERED RESPIRATORY (INHALATION) 2 TIMES DAILY
Qty: 1 INHALER | Refills: 11 | Status: CANCELLED | OUTPATIENT
Start: 2018-10-03

## 2018-10-03 RX ORDER — AMOXICILLIN 500 MG/1
CAPSULE ORAL
Qty: 12 CAPSULE | Refills: 3 | Status: SHIPPED | OUTPATIENT
Start: 2018-10-03 | End: 2019-10-09

## 2018-10-03 RX ORDER — AMLODIPINE BESYLATE 2.5 MG/1
TABLET ORAL
Qty: 270 TABLET | Refills: 3 | Status: SHIPPED | OUTPATIENT
Start: 2018-10-03 | End: 2018-11-14

## 2018-10-03 ASSESSMENT — ANXIETY QUESTIONNAIRES
1. FEELING NERVOUS, ANXIOUS, OR ON EDGE: SEVERAL DAYS
5. BEING SO RESTLESS THAT IT IS HARD TO SIT STILL: NOT AT ALL
3. WORRYING TOO MUCH ABOUT DIFFERENT THINGS: SEVERAL DAYS
GAD7 TOTAL SCORE: 4
7. FEELING AFRAID AS IF SOMETHING AWFUL MIGHT HAPPEN: SEVERAL DAYS
6. BECOMING EASILY ANNOYED OR IRRITABLE: NOT AT ALL
2. NOT BEING ABLE TO STOP OR CONTROL WORRYING: SEVERAL DAYS

## 2018-10-03 ASSESSMENT — PATIENT HEALTH QUESTIONNAIRE - PHQ9: 5. POOR APPETITE OR OVEREATING: NOT AT ALL

## 2018-10-03 NOTE — TELEPHONE ENCOUNTER
"Requested Prescriptions   Pending Prescriptions Disp Refills     fluticasone (FLOVENT HFA) 220 MCG/ACT Inhaler 1 Inhaler 11    Last Written Prescription Date:  10/03/2018  Last Fill Quantity: 1,  # refills: 11   Last office visit: No previous visit found with prescribing provider:  10/03/2018   Future Office Visit:   Sig: Inhale 2 puffs into the lungs 2 times daily 2 puffs    Inhaled Steroids Protocol Failed    10/3/2018 10:26 AM       Failed - Asthma control assessment score within normal limits in last 6 months    Please review ACT score.          Passed - Patient is age 12 or older       Passed - Recent (6 mo) or future (30 days) visit within the authorizing provider's specialty    Patient had office visit in the last 6 months or has a visit in the next 30 days with authorizing provider or within the authorizing provider's specialty.  See \"Patient Info\" tab in inbasket, or \"Choose Columns\" in Meds & Orders section of the refill encounter.            "

## 2018-10-03 NOTE — TELEPHONE ENCOUNTER
[facility] pharmacy sent a fax stating patient's insurance does not cover Flovent and to please send over an alternative

## 2018-10-03 NOTE — MR AVS SNAPSHOT
After Visit Summary   10/3/2018    Tremaine King    MRN: [id number]           Patient Information     Date Of Birth          1970        Visit Information        Provider Department      10/3/2018 8:00 AM Samy Marcelino MD [facility]        Today's Diagnoses     Encounter for routine adult health examination without abnormal findings    -  1    Essential hypertension with goal blood pressure less than 140/90        BMI 35.0-35.9,adult        KOTA (obstructive sleep apnea)        Idiopathic gout, unspecified chronicity, unspecified site        History of repair of mitral valve        Mild persistent asthma without complication        Need for vaccination          Care Instructions    Continue core strengthening for disc preventative maintenance.           Follow-ups after your visit        Your next 10 appointments already scheduled     Oct 10, 2018  3:00 PM CDT   Return Sleep Patient with Gokul Greer MD   [facility] ([facility])    [address] [place] [id number] [phone]            Nov 07, 2018  8:30 AM CST   Ech Complete with [facility] ([facility])    [address] [place] [id number] [phone]           1.  Please bring or wear a comfortable two-piece outfit. 2.  You may eat, drink and take your normal medicines. 3.  For any questions that cannot be answered, please contact the ordering physician 4.  Please do not wear perfumes or scented lotions on the day of your exam.            Nov 14, 2018  8:30 AM CST   (Arrive by 8:15 AM)   Return Visit with Tim Gotti MD   [facility] ([facility])    [address] [place] [id number] [phone]              Who to contact     If you have questions or need follow up information about  today's clinic visit or your schedule please contact [facility] directly at [phone].  Normal or non-critical lab and imaging results will be communicated to you by MyChart, letter or phone within 4 business days after the clinic has received the results. If you do not hear from us within 7 days, please contact the clinic through [email or web address] or phone. If you have a critical or abnormal lab result, we will notify you by phone as soon as possible.  Submit refill requests through [employer] or call your pharmacy and they will forward the refill request to us. Please allow 3 business days for your refill to be completed.          Additional Information About Your Visit        [email or web address] Information     [employer] gives you secure access to your electronic health record. If you see a primary care provider, you can also send messages to your care team and make appointments. If you have questions, please call your primary care clinic.  If you do not have a primary care provider, please call [phone] and they will assist you.        Care EveryWhere ID     This is your Care EveryWhere ID. This could be used by other organizations to access your [place] medical records  [id number]         Blood Pressure from Last 3 Encounters:   11/08/17 (!) 149/94   09/13/17 161/87   03/17/17 155/88    Weight from Last 3 Encounters:   11/08/17 256 lb 12.8 oz (116.5 kg)   09/13/17 257 lb (116.6 kg)   03/17/17 252 lb 4.8 oz (114.4 kg)              We Performed the Following     Comprehensive metabolic panel     Lipid panel reflex to direct LDL Fasting     PSA, screen     Uric acid     ZOSTER VACCINE RECOMBINANT ADJUVANTED IM NJX          Today's Medication Changes          These changes are accurate as of 10/3/18  8:25 AM.  If you have any questions, ask your nurse or doctor.               These medicines have changed or have updated prescriptions.        Dose/Directions    metoprolol succinate 100 MG 24 hr tablet   Commonly  known as:  TOPROL-XL   This may have changed:  See the new instructions.   Used for:  Essential hypertension with goal blood pressure less than 140/90   Changed by:  Samy Marcelino MD        TAKE 1 TABLET (100 MG) BY MOUTH DAILY.   Quantity:  90 tablet   Refills:  3            Where to get your medicines      These medications were sent to [place] [id number] [facility] - [facility] [address]  [address] [facility] [address]     Phone:  [phone]     albuterol 108 (90 Base) MCG/ACT inhaler    allopurinol 300 MG tablet    amLODIPine 2.5 MG tablet    amoxicillin 500 MG capsule    fluticasone 220 MCG/ACT Inhaler    lisinopril 40 MG tablet                Primary Care Provider Office Phone # Fax #    Samy Marcelino [phone]       [address]  [facility] [address]        Equal Access to Services     [facility]: Hadii aad ku hadasho Soomaali, waaxda luqadaha, qaybta kaalmada adeegyada, waxay idiin hayaan kelli salcedoarash vania . So [facility] [phone].    ATENCIÓN: Si habla español, tiene a aguilar disposición servicios gratuitos de asistencia lingüística. Andry al [phone].    We comply with applicable federal civil rights laws and [place] laws. We do not discriminate on the basis of race, color, national origin, age, disability, sex, sexual orientation, or gender identity.            Thank you!     Thank you for choosing [facility]  for your care. Our goal is always to provide you with excellent care. Hearing back from our patients is one way we can continue to improve our services. Please take a few minutes to complete the written survey that you may receive in the mail after your visit with us. Thank you!             Your Updated Medication List - Protect others around you: Learn how to safely use, store and throw away your medicines at www.disposemymeds.org.          This list is accurate as of 10/3/18  8:25 AM.  Always use your most  recent med list.                   Brand Name Dispense Instructions for use Diagnosis    albuterol 108 (90 Base) MCG/ACT inhaler    PROAIR HFA    1 Inhaler    Inhale 2 puffs into the lungs every 6 hours as needed for shortness of breath / dyspnea    Mild persistent asthma without complication       allopurinol 300 MG tablet    ZYLOPRIM    90 tablet    Take 1 tablet (300 mg) by mouth daily    Idiopathic gout, unspecified chronicity, unspecified site       amLODIPine 2.5 MG tablet    NORVASC    270 tablet    TAKE 3 TABLETS BY MOUTH DAILY    Essential hypertension with goal blood pressure less than 140/90       amoxicillin 500 MG capsule    AMOXIL    12 capsule    Please take a total of 2 grams (four 500 mg capsules) 30-60 minutes prior to dental procedure.    History of repair of mitral valve       aspirin 325 MG tablet      Take  by mouth daily.        fluticasone 220 MCG/ACT Inhaler    FLOVENT HFA    1 Inhaler    Inhale 2 puffs into the lungs 2 times daily 2 puffs    Mild persistent asthma without complication       IBUPROFEN PO      Take 400 mg by mouth 2 times daily as needed.        lisinopril 40 MG tablet    PRINIVIL/ZESTRIL    90 tablet    TAKE 1 TABLET (40 MG) BY MOUTH DAILY    Essential hypertension with goal blood pressure less than 140/90       metoprolol succinate 100 MG 24 hr tablet    TOPROL-XL    90 tablet    TAKE 1 TABLET (100 MG) BY MOUTH DAILY.    Essential hypertension with goal blood pressure less than 140/90       order for DME      Equipment being ordered: CPAP Patient Tremaine King was set up at [place] on September 14, 2015. Patient received a [employer] DreamStation Auto CPAP Auto. Pressures were set at Auto 12 - 12 cm H2O.   Patient?s ramp is 9 cm H2O for Auto and FLEX/EPR is A Flex.  Patient received a [employer] Mask name: BLUE GEL NASAL  Nasal mask Size Medium, heated tubing and heated humidifier.  Patient is not enrolled in the STM Program and does not need to meet  compliance. Samina Arora

## 2018-10-04 ASSESSMENT — ASTHMA QUESTIONNAIRES: ACT_TOTALSCORE: 16

## 2018-10-04 ASSESSMENT — ANXIETY QUESTIONNAIRES: GAD7 TOTAL SCORE: 4

## 2018-10-04 ASSESSMENT — PATIENT HEALTH QUESTIONNAIRE - PHQ9: SUM OF ALL RESPONSES TO PHQ QUESTIONS 1-9: 4

## 2018-10-05 NOTE — TELEPHONE ENCOUNTER
Dr. Marcelino,     Pt insurance does not cover Flovent.    Called pharmacy. Wondering about alternative to flovent. Pharmacy advised. QVAR readi haler or Asmanex. Would you like one of those options or start PA?     Pharmacy cued.    Please advise.    Stephanie Farias RN  [facility]

## 2018-10-05 NOTE — PROGRESS NOTES
Shree Penaloza, your recent results are back and are all normal. Please contact if any questions. Nice to see you!   Samy

## 2018-10-08 NOTE — TELEPHONE ENCOUNTER
Called patient. Spoke with wife and notified of new beclomethasone inhaler prescription. Wife verbalized understanding.    Stephanie Farias RN  [facility]

## 2018-10-19 RX ORDER — ALLOPURINOL 300 MG/1
TABLET ORAL
Qty: 90 TABLET | Refills: 3 | OUTPATIENT
Start: 2018-10-19

## 2018-10-19 NOTE — TELEPHONE ENCOUNTER
"Requested Prescriptions   Pending Prescriptions Disp Refills     allopurinol (ZYLOPRIM) 300 MG tablet [Pharmacy Med Name: ALLOPURINOL 300 MG TABLET] 90 tablet 3     Sig: TAKE 1 TABLET (300 MG) BY MOUTH DAILY    Gout Agents Protocol Failed    10/19/2018  2:06 AM       Failed - CBC on file in past 12 months    Recent Labs   Lab Test  02/09/14   1745   WBC  7.4   RBC  4.81   HGB  14.7   HCT  41.1   PLT  192                Passed - ALT on file in past 12 months    Recent Labs   Lab Test  10/03/18   0843   ALT  46            Passed - Has Uric Acid on file in past 12 months and value is less than 6    Recent Labs   Lab Test  10/03/18   0843   URIC  5.1     If level is 6mg/dL or greater, ok to refill one time and refer to provider.          Passed - Recent (12 mo) or future (30 days) visit within the authorizing provider's specialty    Patient had office visit in the last 12 months or has a visit in the next 30 days with authorizing provider or within the authorizing provider's specialty.  See \"Patient Info\" tab in inbasket, or \"Choose Columns\" in Meds & Orders section of the refill encounter.             Passed - Patient is age 18 or older       Passed - Normal serum creatinine on file in the past 12 months    Recent Labs   Lab Test  10/03/18   0843   CR  1.05             "

## 2018-10-19 NOTE — TELEPHONE ENCOUNTER
Per chart review, rx last filled on 10/03/18 for 90 tablets with 3 refills.     Called pharmacy. They stated that they have refills on file and do note that they received prescription on 10/03/18.     Will disregard request.    Summer Giles RN  [facility]

## 2018-11-07 ENCOUNTER — RADIANT APPOINTMENT (OUTPATIENT)
Dept: CARDIOLOGY | Facility: CLINIC | Age: 48
End: 2018-11-07
Payer: COMMERCIAL

## 2018-11-07 DIAGNOSIS — Z87.74 H/O ATRIAL SEPTAL DEFECT REPAIR: ICD-10-CM

## 2018-11-07 DIAGNOSIS — Z98.890 S/P MITRAL VALVE REPAIR: ICD-10-CM

## 2018-11-12 ENCOUNTER — OFFICE VISIT (OUTPATIENT)
Dept: SLEEP MEDICINE | Facility: CLINIC | Age: 48
End: 2018-11-12
Payer: COMMERCIAL

## 2018-11-12 VITALS
RESPIRATION RATE: 18 BRPM | BODY MASS INDEX: 33.18 KG/M2 | SYSTOLIC BLOOD PRESSURE: 157 MMHG | HEIGHT: 71 IN | HEART RATE: 81 BPM | DIASTOLIC BLOOD PRESSURE: 87 MMHG | WEIGHT: 237 LBS | OXYGEN SATURATION: 100 %

## 2018-11-12 DIAGNOSIS — G47.33 OSA ON CPAP: Primary | ICD-10-CM

## 2018-11-12 PROCEDURE — 99214 OFFICE O/P EST MOD 30 MIN: CPT | Performed by: INTERNAL MEDICINE

## 2018-11-12 NOTE — PROGRESS NOTES
Sleep medicine follow-up visit note     Date of visit: November 12, 2018    Purpose of visit: follow-up of KOTA     History of present illness: Tremaine King  is a 48-year-old male who was previously diagnosed with moderate obstructive sleep apnea. He is currently being treated with the CPAP device with fixed pressure  setting a 12 cm of water. He had  his  CPAP device replaced in 2015. He reports using the CPAP regularly during sleep. He uses nasal mask. With the CPAP, there have not been any concerns of snoring or awakenings due to gasping for air or choking sensation. He reports positive benefits with good sleep quality with the CPAP use. During the workdays his bedtime is 10-11 PM(usually close to 11 PM) and he wakes up at 5:05 AM with an alarm. During weekends his bed time is 11 PM and he wakes up between 7 to 8 AM spontaneously. He  denies fatigue or excessive daytime sleepiness. He endorses and [place] sleepiness score of 0 out of 24. He denies drowsiness while driving.    Compliance download:for the past month reveals that he has used the device for 30 out  of 30 days with an average daily usage of 6 hours and 15 minutes on the nights used. There was no significant air leak. Residual AHI was 1.1 per hour.    In April 2018 he had herniated disc L4-L5 for which he has been going through physical therapy and  was not recommended any surgical intervention.  He has been doing the physical therapy currently on his own.      Current meds:  Current Outpatient Prescriptions   Medication Sig Dispense Refill     albuterol (PROAIR HFA) 108 (90 Base) MCG/ACT inhaler Inhale 2 puffs into the lungs every 6 hours as needed for shortness of breath / dyspnea 1 Inhaler 11     allopurinol (ZYLOPRIM) 300 MG tablet Take 1 tablet (300 mg) by mouth daily 90 tablet 3     amLODIPine (NORVASC) 2.5 MG tablet TAKE 3 TABLETS BY MOUTH DAILY 270 tablet 3     amoxicillin (AMOXIL) 500 MG capsule Please take a total of 2 grams (four 500 mg  capsules) 30-60 minutes prior to dental procedure. 12 capsule 3     aspirin 325 MG tablet Take  by mouth daily.       beclomethasone HFA (QVAR REDIHALER) 80 MCG/ACT inhaler Inhale 1 puff into the lungs 2 times daily 10.6 g 11     fluticasone (FLOVENT HFA) 220 MCG/ACT Inhaler Inhale 2 puffs into the lungs 2 times daily 2 puffs 1 Inhaler 11     IBUPROFEN PO Take 400 mg by mouth 2 times daily as needed.         lisinopril (PRINIVIL/ZESTRIL) 40 MG tablet TAKE 1 TABLET (40 MG) BY MOUTH DAILY 90 tablet 3     metoprolol succinate (TOPROL-XL) 100 MG 24 hr tablet TAKE 1 TABLET (100 MG) BY MOUTH DAILY. 90 tablet 3     order for DME Equipment being ordered: CPAP  Patient Tremaine King was set up at [facility] on September 14, 2015. Patient received a [employer] Respir[employer] DreamStation Auto CPAP Auto. Pressures were set at Auto 12 - 12 cm H2O.   Patient s ramp is 9 cm H2O for Auto and FLEX/EPR is A Flex.  Patient received a [employer] Mask name: BLUE GEL NASAL  Nasal mask Size Medium, heated tubing and heated humidifier.  Patient is not enrolled in the STM Program and does not need to meet compliance. Samina Arora         Past medical history:  Past Medical History:   Diagnosis Date     ASD (atrial septal defect)     s/p repair     Lumbar disc herniation 05/2018    better after steroid inj and PT     Patient Active Problem List   Diagnosis     History of repair of mitral valve     CARDIOVASCULAR SCREENING; LDL GOAL LESS THAN 130     ASD (atrial septal defect)     Essential hypertension with goal blood pressure less than 140/90     Idiopathic gout, unspecified chronicity, unspecified site     BMI 35.0-35.9,adult     KOTA (obstructive sleep apnea)     Lumbar disc herniation     Past surgical history:  Past Surgical History:   Procedure Laterality Date     REPAIR ATRIAL SEPTAL DEFECT  AGE 3     REPAIR VALVE MITRAL  AGE 3 AND JUNE 2005    annuloplasty ring and MVR in June 2005     REPAIR VALVE MITRAL       Allergies:No  "Known Allergies     Physical exam:  /87  Pulse 81  Resp 18  Ht 1.803 m (5' 11\")  Wt 107.5 kg (237 lb)  SpO2 100%  BMI 33.05 kg/m2'  General appearance:  in no apparent distress  Pt is dressed casually, cooperative with good eye contact.   CVS: Regular S1, S2.  Ejection systolic murmur grade 2/6.  No gallops  Respiratory: Clear to auscultation bilaterally, with no rales or rhonchi  Speech is spontaneous with regular rate and volume.   Mood: euthymic; affect congruent with full range and intensity.   Sensorium: awake, alert and oriented to person, place, time, and situation.     Assessment/Plan:  1. Moderate obstructive sleep apnea: Patient reports adequate compliance with  CPAP device and continues to report positive benefits with the device use. Based in the compliance measures KOTA appears to be adequately controlled with CPAP at the current pressure settings. I have provided him with a prescription for renewal of all the CPAP supplies. Recommended him to continue using the CPAP device regularly during sleep and instructed him to get the supplies for the device regularly replaced.  We discussed weight management with diet and exercise.     2. Chronic insufficient sleep: He reports insufficient sleep duration on work nights. We discussed the consequences of chronic sleep deprivation and encouraged him to increase the duration of sleep to  at least 7 hours to 8 hours of sleep per night.  He was recommended to gradually advance his bedtime from 11 PM to 10 PM. We discussed minimizing the exposure to bright lights after 8 PM, avoiding screen time before bed and avoiding mind stimulating activities before bed. He was encouraged to practice relaxation techniques like meditation before bed.     He was instructed to avoid driving/ operating heavy machinery, if drowsy or sleepy.     3. Hypertension: He reports being compliant with his medications. His blood pressure was high today. He mentioned that his blood " "pressure was pretty normal when he was last recently seen by his primary care provider on 10/3/2018, 132/84 mm Hg. He was recommended to monitor his blood pressure and follow up with his primary care provider for optimizing the management of the hypertension.     Plan is to follow up at sleep clinic  in one year sooner there any concerns.     The above note was dictated using voice recognition software. Although reviewed after completion, some word and grammatical error may remain . Please contact the author for any clarifications.    \"I spent a total of 25 minutes face to face with Tremaine King during today's office visit. Over 50% of this time was spent counseling the patient and  coordinating care regarding CPAP, consequences of chronic sleep deprivation, increasing sleep duration and hypertension\"       Gokul Greer MD   of Medicine,  Division of Pulmonary/Sleep Medicine  [facility].       "

## 2018-11-12 NOTE — MR AVS SNAPSHOT
After Visit Summary   11/12/2018    Tremaine King    MRN: [id number]           Patient Information     Date Of Birth          1970        Visit Information        Provider Department      11/12/2018 1:30 PM Gokul Greer MD [facility]        Today's Diagnoses     KOTA on CPAP    -  1       Follow-ups after your visit        Follow-up notes from your care team     Return in about 1 year (around 11/12/2019).      Your next 10 appointments already scheduled     Nov 14, 2018  8:30 AM CST   (Arrive by 8:15 AM)   Return Visit with Tim Gotti MD   [facility] ([facility] [place])    [address]  [address]  [facility] [address] [phone]              Who to contact     If you have questions or need follow up information about today's clinic visit or your schedule please contact [facility] directly at [phone].  Normal or non-critical lab and imaging results will be communicated to you by MyChart, letter or phone within 4 business days after the clinic has received the results. If you do not hear from us within 7 days, please contact the clinic through [employer] or phone. If you have a critical or abnormal lab result, we will notify you by phone as soon as possible.  Submit refill requests through [employer] or call your pharmacy and they will forward the refill request to us. Please allow 3 business days for your refill to be completed.          Additional Information About Your Visit        MyChart Information     [employer] gives you secure access to your electronic health record. If you see a primary care provider, you can also send messages to your care team and make appointments. If you have questions, please call your primary care clinic.  If you do not have a primary care provider, please call [phone] and they will assist you.        Care EveryWhere ID     This is your Care  "EveryWhere ID. This could be used by other organizations to access your [facility] medical records  [id number]        Your Vitals Were     Pulse Respirations Height Pulse Oximetry BMI (Body Mass Index)       81 18 1.803 m (5' 11\") 100% 33.05 kg/m2        Blood Pressure from Last 3 Encounters:   11/12/18 157/87   10/03/18 132/84   11/08/17 (!) 149/94    Weight from Last 3 Encounters:   11/12/18 107.5 kg (237 lb)   11/08/17 116.5 kg (256 lb 12.8 oz)   09/13/17 116.6 kg (257 lb)              We Performed the Following     Comprehensive DME        Primary Care Provider Office Phone # Fax #    Samy Marcelino [phone]       [address] [address]        Equal Access to Services     [facility]: Hadii aad lissa hadasho Soomaali, waaxda luqadaha, qaybta kaalmada adeegyada, waxay srikanth hayswati caro . So [facility] [phone].    ATENCIÓN: Si habla español, tiene a aguilar disposición servicios gratuitos de asistencia lingüística. Andry al [phone].    We comply with applicable federal civil rights laws and [place] laws. We do not discriminate on the basis of race, color, national origin, age, disability, sex, sexual orientation, or gender identity.            Thank you!     Thank you for choosing [facility]  for your care. Our goal is always to provide you with excellent care. Hearing back from our patients is one way we can continue to improve our services. Please take a few minutes to complete the written survey that you may receive in the mail after your visit with us. Thank you!             Your Updated Medication List - Protect others around you: Learn how to safely use, store and throw away your medicines at www.disposemymeds.org.          This list is accurate as of 11/12/18  1:58 PM.  Always use your most recent med list.                   Brand Name Dispense Instructions for use Diagnosis    albuterol 108 (90 Base) MCG/ACT " inhaler    PROAIR HFA    1 Inhaler    Inhale 2 puffs into the lungs every 6 hours as needed for shortness of breath / dyspnea    Mild persistent asthma without complication       allopurinol 300 MG tablet    ZYLOPRIM    90 tablet    Take 1 tablet (300 mg) by mouth daily    Idiopathic gout, unspecified chronicity, unspecified site       amLODIPine 2.5 MG tablet    NORVASC    270 tablet    TAKE 3 TABLETS BY MOUTH DAILY    Essential hypertension with goal blood pressure less than 140/90       amoxicillin 500 MG capsule    AMOXIL    12 capsule    Please take a total of 2 grams (four 500 mg capsules) 30-60 minutes prior to dental procedure.    History of repair of mitral valve       aspirin 325 MG tablet      Take  by mouth daily.        beclomethasone HFA 80 MCG/ACT inhaler    QVAR REDIHALER    10.6 g    Inhale 1 puff into the lungs 2 times daily    Mild persistent asthma without complication       fluticasone 220 MCG/ACT Inhaler    FLOVENT HFA    1 Inhaler    Inhale 2 puffs into the lungs 2 times daily 2 puffs    Mild persistent asthma without complication       IBUPROFEN PO      Take 400 mg by mouth 2 times daily as needed.        lisinopril 40 MG tablet    PRINIVIL/ZESTRIL    90 tablet    TAKE 1 TABLET (40 MG) BY MOUTH DAILY    Essential hypertension with goal blood pressure less than 140/90       metoprolol succinate 100 MG 24 hr tablet    TOPROL-XL    90 tablet    TAKE 1 TABLET (100 MG) BY MOUTH DAILY.    Essential hypertension with goal blood pressure less than 140/90       order for DME      Equipment being ordered: CPAP Patient Tremaine King was set up at [facility] on September 14, 2015. Patient received a [employer] DreamStation Auto CPAP Auto. Pressures were set at Auto 12 - 12 cm H2O.   Patient?s ramp is 9 cm H2O for Auto and FLEX/EPR is A Flex.  Patient received a [employer] Mask name: BLUE GEL NASAL  Nasal mask Size Medium, heated tubing and heated humidifier.  Patient is not enrolled in  the STM Program and does not need to meet compliance. Samina Arora

## 2018-11-13 ENCOUNTER — PATIENT OUTREACH (OUTPATIENT)
Dept: CARE COORDINATION | Facility: CLINIC | Age: 48
End: 2018-11-13

## 2018-11-14 ENCOUNTER — OFFICE VISIT (OUTPATIENT)
Dept: CARDIOLOGY | Facility: CLINIC | Age: 48
End: 2018-11-14
Attending: INTERNAL MEDICINE
Payer: COMMERCIAL

## 2018-11-14 VITALS
SYSTOLIC BLOOD PRESSURE: 150 MMHG | DIASTOLIC BLOOD PRESSURE: 86 MMHG | WEIGHT: 235.8 LBS | OXYGEN SATURATION: 98 % | BODY MASS INDEX: 33.01 KG/M2 | HEART RATE: 70 BPM | HEIGHT: 71 IN

## 2018-11-14 DIAGNOSIS — Z98.890 HX OF MITRAL VALVE REPAIR: Primary | ICD-10-CM

## 2018-11-14 DIAGNOSIS — I10 ESSENTIAL HYPERTENSION WITH GOAL BLOOD PRESSURE LESS THAN 140/90: ICD-10-CM

## 2018-11-14 PROCEDURE — G0463 HOSPITAL OUTPT CLINIC VISIT: HCPCS | Mod: ZF

## 2018-11-14 PROCEDURE — 99214 OFFICE O/P EST MOD 30 MIN: CPT | Mod: ZP | Performed by: INTERNAL MEDICINE

## 2018-11-14 RX ORDER — AMLODIPINE BESYLATE 10 MG/1
10 TABLET ORAL DAILY
Qty: 90 TABLET | Refills: 3 | Status: SHIPPED | OUTPATIENT
Start: 2018-11-14 | End: 2018-11-14

## 2018-11-14 ASSESSMENT — PAIN SCALES - GENERAL: PAINLEVEL: NO PAIN (0)

## 2018-11-14 NOTE — LETTER
2018      RE: Tremaine King  [address]  [facility] [address]       Dear Colleague,    Thank you for the opportunity to participate in the care of your patient, Tremaine King, at the [facility]. Please see a copy of my visit note below.    [id number]      Service Date: 2018      Samy Marcelino MD   [address] [address]      RE: Tremaine King   MRN: [id number]   : 1970      Dear Dr. Marcelino:       It was a pleasure participating in the care of your patient . Tremaine King.  As you know, he is a 48-year-old gentleman whom I see today for a history of mitral valve repair and ASD repair.        His past medical history is significant for the followin.  Migraine headaches.   2.  Gout.   3.  Seasonal allergies with a verbal history of mild asthma.   4.  Meniscus tear of the knee.   5.  History of morbid obesity with significant weight loss due to diet and exercise.   6.  Sleep apnea treated with CPAP since .      His cardiac history is significant for having had an ASD repair along with mitral valve repair at age 3.  He had subsequent mitral insufficiency and required a mitral valve repair with an annuloplasty ring in  by Dr. Hernandez.  His postop course was complicated by a high resting pulse, at which time Dr. Diehl performed an EP study, which did not induce significant arrhythmias, and he was diagnosed as having inappropriate sinus tachycardia.  He was medically managed.      I last saw him 2017, and at that time he was doing adequately.  He presents today for continuing care.      Since our last visit, he has done extremely well.  He has started an exercise biking program called the [employer].  On the weekends, he bikes for 45 minutes to an hour, keeping his heart rate in the 120-130 range.  He says that if he bikes outside, he can keep his heart rate at the  120-130 range for up to 2 hours without stopping.  He specifically denies new chest pain, problems breathing, PND, orthopnea, edema, palpitations, syncope or near-syncope.  He feels his exertional capacity is good, and he has no other complaints.      He continues his work as a  and enjoys watching educational YouTube videos in combination with what he calls depressing and nonfiction and uplifting humorous science fiction fantasy.        CURRENT MEDICATIONS:     1.  Amlodipine 7.5 mg a day.   2.  Aspirin 325 mg a day.   3.  Lisinopril 40 mg a day.   4.  Metoprolol succinate 100 mg a day.      PHYSICAL EXAMINATION:     VITAL SIGNS:  His blood pressure is 150/80 with a pulse of 70.  His weight is 235 pounds.   NECK:  No obvious jugular venous distention.   LUNGS:  Clear to auscultation.  Respiratory effort is normal.   CARDIAC:  Regular rate and rhythm.  There is a 2/6 systolic ejection murmur.  Diastolic murmur is barely audible.   ABDOMEN:  Belly is soft and nontender.   EXTREMITIES:  Without gross edema.      IMAGING:  Echocardiogram 11/07/2018 reveals an ejection fraction of 55%-60%, mean gradient of 11-13 with a heart rate of 70, mild MR, mild increase in gradient from last study.      LABORATORY:  On 10/03/2018, potassium 4.1, GFR normal.        IMPRESSION:      Tremaine is a 48-year-old gentleman whose cardiac history is significant for having had an atrial septal defect and mitral valve repair at age 3 and had a mitral annuloplasty ring in 2005 for mitral insufficiency.  He has several active cardiac issues:     1.  Mitral valve repair.      From a clinical standpoint, he continues to be completely exertionally unlimited at a very high level of exertion.  He can maintain a heart rate of 120-130 beats per minute for 1-2 hours on his bike in an unlimited fashion.  He specifically denies any shortness of breath, chest pain or exertional limitation.      His echo has revealed a mitral gradient that has  been elevated over the years, and according to the latest echo on 2018, it has possibly increased slightly, but certainly he has no clinical symptoms at a high level of exertion.  We will continue to monitor hemodynamics and monitor for clinical symptoms. Obviously if clinical symptoms should arise secondary to his valve, then this would lead us to a much more aggressive approach.    2.  Hypertension.  His blood pressures have been running slightly high, and we will attempt to optimize medical therapy.        PLAN:     1.  Increase amlodipine from 7.5-10 mg a day.  He has not tolerated thiazide diuretics well in the past due to hypokalemia; therefore, we would consider starting a low dose of spironolactone at that time.     2.  Otherwise, he can follow up with me in approximately 1 year with an echocardiogram or earlier if needed.      Once again, it was a pleasure participating in the care of your patient Mr. Tremaine Horn.  Please feel free to contact me at any time if you have any questions regarding his care in the future.       Sincerely,      Tim Gotti MD               D: 2018   T: 2018   MT: ericka      Name:     TREMAINE HORN   MRN:      [id number]        Account:      [id number]   :      1970           Service Date: 2018      Document: [id number]        Please do not hesitate to contact me if you have any questions/concerns.     Sincerely,     Tim Gotti MD

## 2018-11-14 NOTE — PROGRESS NOTES
Service Date: 2018      Samy Marcelino MD   [address] [address]      RE: Tremaine King   MRN: [id number]   : 1970      Dear Dr. Marcelino:       It was a pleasure participating in the care of your patient Mr. Tremaine King.  As you know, he is a 48-year-old gentleman whom I see today for a history of mitral valve repair and ASD repair.        His past medical history is significant for the followin.  Migraine headaches.   2.  Gout.   3.  Seasonal allergies with a verbal history of mild asthma.   4.  Meniscus tear of the knee.   5.  History of morbid obesity with significant weight loss due to diet and exercise.   6.  Sleep apnea treated with CPAP since .      His cardiac history is significant for having had an ASD repair along with mitral valve repair at age 3.  He had subsequent mitral insufficiency and required a mitral valve repair with an annuloplasty ring in  by Dr. Hernandez.  His postop course was complicated by a high resting pulse, at which time Dr. Diehl performed an EP study, which did not induce significant arrhythmias, and he was diagnosed as having inappropriate sinus tachycardia.  He was medically managed.      I last saw him 2017, and at that time he was doing adequately.  He presents today for continuing care.      Since our last visit, he has done extremely well.  He has started an exercise biking program called the [employer].  On the weekends, he bikes for 45 minutes to an hour, keeping his heart rate in the 120-130 range.  He says that if he bikes outside, he can keep his heart rate at the 120-130 range for up to 2 hours without stopping.  He specifically denies new chest pain, problems breathing, PND, orthopnea, edema, palpitations, syncope or near-syncope.  He feels his exertional capacity is good, and he has no other complaints.      He continues his work as a  and enjoys watching educational  YouTube videos in combination with what he calls depressing and nonfiction and uplifting humorous science fiction fantasy.        CURRENT MEDICATIONS:     1.  Amlodipine 7.5 mg a day.   2.  Aspirin 325 mg a day.   3.  Lisinopril 40 mg a day.   4.  Metoprolol succinate 100 mg a day.      PHYSICAL EXAMINATION:     VITAL SIGNS:  His blood pressure is 150/80 with a pulse of 70.  His weight is 235 pounds.   NECK:  No obvious jugular venous distention.   LUNGS:  Clear to auscultation.  Respiratory effort is normal.   CARDIAC:  Regular rate and rhythm.  There is a 2/6 systolic ejection murmur.  Diastolic murmur is barely audible.   ABDOMEN:  Belly is soft and nontender.   EXTREMITIES:  Without gross edema.      IMAGING:  Echocardiogram 11/07/2018 reveals an ejection fraction of 55%-60%, mean gradient of 11-13 with a heart rate of 70, mild MR, mild increase in gradient from last study.      LABORATORY:  On 10/03/2018, potassium 4.1, GFR normal.        IMPRESSION:      Tremaine is a 48-year-old gentleman whose cardiac history is significant for having had an atrial septal defect and mitral valve repair at age 3 and had a mitral annuloplasty ring in 2005 for mitral insufficiency.  He has several active cardiac issues:     1.  Mitral valve repair.      From a clinical standpoint, he continues to be completely exertionally unlimited at a very high level of exertion.  He can maintain a heart rate of 120-130 beats per minute for 1-2 hours on his bike in an unlimited fashion.  He specifically denies any shortness of breath, chest pain or exertional limitation.      His echo has revealed a mitral gradient that has been elevated over the years, and according to the latest echo on 11/07/2018, it has possibly increased slightly, but certainly he has no clinical symptoms at a high level of exertion.  We will continue to monitor hemodynamics and monitor for clinical symptoms. Obviously if clinical symptoms should arise secondary to his  valve, then this would lead us to a much more aggressive approach.    2.  Hypertension.  His blood pressures have been running slightly high, and we will attempt to optimize medical therapy.        PLAN:     1.  Increase amlodipine from 7.5-10 mg a day.  He has not tolerated thiazide diuretics well in the past due to hypokalemia; therefore, we would consider starting a low dose of spironolactone at that time.     2.  Otherwise, he can follow up with me in approximately 1 year with an echocardiogram or earlier if needed.      Once again, it was a pleasure participating in the care of your patient Mr. Tremaine Horn.  Please feel free to contact me at any time if you have any questions regarding his care in the future.       Sincerely,      Tim Gotti MD               D: 2018   T: 2018   MT: ericka      Name:     TREMAINE HORN   MRN:      [id number]        Account:      [id number]   :      1970           Service Date: 2018      Document: [id number]

## 2018-11-14 NOTE — MR AVS SNAPSHOT
After Visit Summary   11/14/2018    Tremaine King    MRN: [id number]           Patient Information     Date Of Birth          1970        Visit Information        Provider Department      11/14/2018 8:30 AM Tim Gotti MD [facility]        Today's Diagnoses     Hx of mitral valve repair    -  1    Essential hypertension with goal blood pressure less than 140/90          Care Instructions    Patient Instructions:  It was a pleasure to see you in the cardiology clinic today.      If you have any questions, you can reach my nurse, Teresa Reynoso LPN, at [phone].  Press Option #1 for the [facility], and then press Option #3 for nursing.    We are encouraging the use of VCharget to communicate with your HealthCare Provider    Medication Changes: Increase Amlodipine to 10 mg every day. Please record blood pressures daily.  In 3 weeks please contact clinic to provide us 2 weeks worth of readings.    Studies Ordered: Repeat Echocardiogram in 1 year.    The results from today include: Echocardiogram.    Please follow up: With Dr. Gotti in one year.    Sincerely,    Tim Gotti MD     If you have an urgent need after hours (8:00 am to 4:30 pm) please call [phone] and ask for the cardiology fellow on call.                    Follow-ups after your visit        Additional Services     Follow-Up with Cardiologist                 Future tests that were ordered for you today     Open Future Orders        Priority Expected Expires Ordered    Follow-Up with Cardiologist Routine 11/14/2019 11/15/2019 11/14/2018    Echo Complete Routine 11/14/2019 11/15/2019 11/14/2018            Who to contact     If you have questions or need follow up information about today's clinic visit or your schedule please contact [facility] directly at [phone].  Normal or non-critical lab and imaging results will be communicated to you by MyChart, letter or phone within 4  "business days after the clinic has received the results. If you do not hear from us within 7 days, please contact the clinic through RelayRides or phone. If you have a critical or abnormal lab result, we will notify you by phone as soon as possible.  Submit refill requests through RelayRides or call your pharmacy and they will forward the refill request to us. Please allow 3 business days for your refill to be completed.          Additional Information About Your Visit        RelayRides Information     RelayRides gives you secure access to your electronic health record. If you see a primary care provider, you can also send messages to your care team and make appointments. If you have questions, please call your primary care clinic.  If you do not have a primary care provider, please call [phone] and they will assist you.        Care EveryWhere ID     This is your Care EveryWhere ID. This could be used by other organizations to access your [facility] medical records  [id number]        Your Vitals Were     Pulse Height Pulse Oximetry BMI (Body Mass Index)          70 1.803 m (5' 11\") 98% 32.89 kg/m2         Blood Pressure from Last 3 Encounters:   11/14/18 150/86   11/12/18 157/87   10/03/18 132/84    Weight from Last 3 Encounters:   11/14/18 107 kg (235 lb 12.8 oz)   11/12/18 107.5 kg (237 lb)   11/08/17 116.5 kg (256 lb 12.8 oz)                 Today's Medication Changes          These changes are accurate as of 11/14/18  9:05 AM.  If you have any questions, ask your nurse or doctor.               These medicines have changed or have updated prescriptions.        Dose/Directions    amLODIPine 10 MG tablet   Commonly known as:  NORVASC   This may have changed:    - medication strength  - how much to take  - how to take this  - when to take this   Used for:  Essential hypertension with goal blood pressure less than 140/90   Changed by:  Tim Gotti MD        Dose:  10 mg   Take 1 tablet (10 mg) by mouth daily TAKE 3 " TABLETS BY MOUTH DAILY   Quantity:  90 tablet   Refills:  3            Where to get your medicines      These medications were sent to [place] [id number] [facility] [place], [place] - [address]  [address] [facility] [address]     Phone:  [phone]     amLODIPine 10 MG tablet                Primary Care Provider Office Phone # Fax #    Samy Marcelino [phone]       [address]  [facility] [address]        Equal Access to Services     [facility]: Hadii aad ku hadasho Soomaali, waaxda luqadaha, qaybta kaalmada adeegyada, waxay idiin hayaan adeeg kharavarsha caro . So [facility] [phone].    ATENCIÓN: Si habla español, tiene a aguilar disposición servicios gratuitos de asistencia lingüística. Ll[facility] [phone].    We comply with applicable federal civil rights laws and [place] laws. We do not discriminate on the basis of race, color, national origin, age, disability, sex, sexual orientation, or gender identity.            Thank you!     Thank you for choosing [facility]  for your care. Our goal is always to provide you with excellent care. Hearing back from our patients is one way we can continue to improve our services. Please take a few minutes to complete the written survey that you may receive in the mail after your visit with us. Thank you!             Your Updated Medication List - Protect others around you: Learn how to safely use, store and throw away your medicines at www.disposemymeds.org.          This list is accurate as of 11/14/18  9:05 AM.  Always use your most recent med list.                   Brand Name Dispense Instructions for use Diagnosis    albuterol 108 (90 Base) MCG/ACT inhaler    PROAIR HFA    1 Inhaler    Inhale 2 puffs into the lungs every 6 hours as needed for shortness of breath / dyspnea    Mild persistent asthma without complication       allopurinol 300 MG tablet    ZYLOPRIM    90 tablet    Take 1 tablet (300 mg) by mouth  daily    Idiopathic gout, unspecified chronicity, unspecified site       amLODIPine 10 MG tablet    NORVASC    90 tablet    Take 1 tablet (10 mg) by mouth daily TAKE 3 TABLETS BY MOUTH DAILY    Essential hypertension with goal blood pressure less than 140/90       amoxicillin 500 MG capsule    AMOXIL    12 capsule    Please take a total of 2 grams (four 500 mg capsules) 30-60 minutes prior to dental procedure.    History of repair of mitral valve       aspirin 325 MG tablet      Take  by mouth daily.        beclomethasone HFA 80 MCG/ACT inhaler    QVAR REDIHALER    10.6 g    Inhale 1 puff into the lungs 2 times daily    Mild persistent asthma without complication       fluticasone 220 MCG/ACT Inhaler    FLOVENT HFA    1 Inhaler    Inhale 2 puffs into the lungs 2 times daily 2 puffs    Mild persistent asthma without complication       IBUPROFEN PO      Take 400 mg by mouth 2 times daily as needed.        lisinopril 40 MG tablet    PRINIVIL/ZESTRIL    90 tablet    TAKE 1 TABLET (40 MG) BY MOUTH DAILY    Essential hypertension with goal blood pressure less than 140/90       metoprolol succinate 100 MG 24 hr tablet    TOPROL-XL    90 tablet    TAKE 1 TABLET (100 MG) BY MOUTH DAILY.    Essential hypertension with goal blood pressure less than 140/90       order for DME      Equipment being ordered: CPAP Patient Tremaine King was set up at [place] on September 14, 2015. Patient received a [employer] DreamStation Auto CPAP Auto. Pressures were set at Auto 12 - 12 cm H2O.   Patient?s ramp is 9 cm H2O for Auto and FLEX/EPR is A Flex.  Patient received a [employer] Mask name: BLUE GEL NASAL  Nasal mask Size Medium, heated tubing and heated humidifier.  Patient is not enrolled in the [facility] Program and does not need to meet compliance. Samina Arora

## 2018-11-14 NOTE — NURSING NOTE
Chief Complaint   Patient presents with     Follow Up For      1 Yr F/U     Vitals were taken and medications were reconciled.     Celeste Garcia MA    8:27 AM

## 2018-11-14 NOTE — PATIENT INSTRUCTIONS
Patient Instructions:  It was a pleasure to see you in the cardiology clinic today.      If you have any questions, you can reach my nurse, Teresa Reynoso LPN, at [phone].  Press Option #1 for the [facility], and then press Option #3 for nursing.    We are encouraging the use of Trott to communicate with your HealthCare Provider    Medication Changes: Increase Amlodipine to 10 mg every day. Please record blood pressures daily.  In 3 weeks please contact clinic to provide us 2 weeks worth of readings.    Studies Ordered: Repeat Echocardiogram in 1 year.    The results from today include: Echocardiogram.    Please follow up: With Dr. Gotti in one year.    Sincerely,    Tim Gotti MD     If you have an urgent need after hours (8:00 am to 4:30 pm) please call [phone] and ask for the cardiology fellow on call.

## 2018-11-14 NOTE — NURSING NOTE
Cardiac Testing: Patient given instructions regarding  echocardiogram . Discussed purpose, preparation, procedure and when to expect results reported back to the patient. Patient demonstrated understanding of this information and agreed to call with further questions or concerns.  Med Reconcile: Reviewed and verified all current medications with the patient. The updated medication list was printed and given to the patient.  Return Appointment: Patient given instructions regarding scheduling next clinic visit. Patient demonstrated understanding of this information and agreed to call with further questions or concerns.  Medication Change: Patient was educated regarding prescribed medication change, including discussion of the indication, administration, side effects, and when to report to MD or RN. Patient demonstrated understanding of this information and agreed to call with further questions or concerns.  Patient stated he understood all health information given and agreed to call with further questions or concerns.    Teresa Reynoso LPN

## 2018-11-15 ENCOUNTER — TELEPHONE (OUTPATIENT)
Dept: SLEEP MEDICINE | Facility: CLINIC | Age: 48
End: 2018-11-15

## 2018-11-15 RX ORDER — AMLODIPINE BESYLATE 10 MG/1
10 TABLET ORAL DAILY
Qty: 90 TABLET | Refills: 3 | Status: SHIPPED | OUTPATIENT
Start: 2018-11-15 | End: 2019-10-09

## 2018-11-15 NOTE — TELEPHONE ENCOUNTER
I called patient today 11/15/18 to see if he would like to reorder pap supplies. Dr. Simmons put in a new order. No answer left message for patient to call me here in [place] or [facility] in [facility] to reorder supplies. I left both phone numbers for patient.

## 2019-02-06 ENCOUNTER — TELEPHONE (OUTPATIENT)
Dept: FAMILY MEDICINE | Facility: CLINIC | Age: 49
End: 2019-02-06

## 2019-02-06 NOTE — LETTER
Tremaine King  [address] [facility] [address]              [facility]  [address] [place] [address] [address]  [facility] [address]      -

## 2019-02-06 NOTE — TELEPHONE ENCOUNTER
Panel Management Review      Patient has the following on his problem list:     Asthma review     ACT Total Scores 10/3/2018   ACT TOTAL SCORE -   ASTHMA ER VISITS -   ASTHMA HOSPITALIZATIONS -   ACT TOTAL SCORE (Goal Greater than or Equal to 20) 16   In the past 12 months, how many times did you visit the emergency room for your asthma without being admitted to the hospital? 0   In the past 12 months, how many times were you hospitalized overnight because of your asthma? 0      1. Is Asthma diagnosis on the Problem List? Yes    2. Is Asthma listed on Health Maintenance? Yes    3. Patient is due for:  ACT      Composite cancer screening  Chart review shows that this patient is due/due soon for the following None  Summary:    Patient is due/failing the following:   ACT    Action needed:   Patient needs to do ACT.    Type of outreach:    Sent letter.    Questions for provider review:    None                                                                                                                                    Dakota Randall    [facility]     Chart routed to Care Team .

## 2019-07-20 DIAGNOSIS — M10.00 IDIOPATHIC GOUT, UNSPECIFIED CHRONICITY, UNSPECIFIED SITE: ICD-10-CM

## 2019-07-22 NOTE — TELEPHONE ENCOUNTER
"Requested Prescriptions   Pending Prescriptions Disp Refills     allopurinol (ZYLOPRIM) 300 MG tablet [Pharmacy Med Name: Allopurinol Oral Tablet 300 MG] 90 tablet 0     Sig: TAKE ONE TABLET BY MOUTH ONE TIME DAILY  Last Written Prescription Date:  10/03/2018  Last Fill Quantity: 90,  # refills: 3   Last office visit: 10/3/2018 with prescribing provider:  10/03/2018   Future Office Visit:         Gout Agents Protocol Failed - 7/20/2019  5:34 AM        Failed - CBC on file in past 12 months     Recent Labs   Lab Test 02/09/14  1745   WBC 7.4   RBC 4.81   HGB 14.7   HCT 41.1                    Passed - ALT on file in past 12 months     Recent Labs   Lab Test 10/03/18  0843   ALT 46             Passed - Has Uric Acid on file in past 12 months and value is less than 6     Recent Labs   Lab Test 10/03/18  0843   URIC 5.1     If level is 6mg/dL or greater, ok to refill one time and refer to provider.           Passed - Recent (12 mo) or future (30 days) visit within the authorizing provider's specialty     Patient had office visit in the last 12 months or has a visit in the next 30 days with authorizing provider or within the authorizing provider's specialty.  See \"Patient Info\" tab in inbasket, or \"Choose Columns\" in Meds & Orders section of the refill encounter.              Passed - Medication is active on med list        Passed - Patient is age 18 or older        Passed - Normal serum creatinine on file in the past 12 months     Recent Labs   Lab Test 10/03/18  0843   CR 1.05             "

## 2019-07-22 NOTE — TELEPHONE ENCOUNTER
On 10/3/18 MD signed allopurinol for one year but to different pharmacy    Overdue for annual CBC. Last CBC was in 2014    Refill request to MD/primary care provider.  Amna Conway RN

## 2019-07-23 RX ORDER — ALLOPURINOL 300 MG/1
TABLET ORAL
Qty: 90 TABLET | Refills: 1 | Status: SHIPPED | OUTPATIENT
Start: 2019-07-23 | End: 2019-10-09

## 2019-09-29 DIAGNOSIS — I10 ESSENTIAL HYPERTENSION WITH GOAL BLOOD PRESSURE LESS THAN 140/90: ICD-10-CM

## 2019-09-30 NOTE — TELEPHONE ENCOUNTER
"Requested Prescriptions   Pending Prescriptions Disp Refills     lisinopril (PRINIVIL/ZESTRIL) 40 MG tablet [Pharmacy Med Name: Lisinopril Oral Tablet 40 MG] 90 tablet 0     Sig: TAKE ONE TABLET BY MOUTH ONE TIME DAILY  Last Written Prescription Date:  10/03/2018  Last Fill Quantity: 90,  # refills: 3   Last office visit: 10/3/2018 with prescribing provider:  10/03/2018   Future Office Visit:   Next 5 appointments (look out 90 days)    Oct 09, 2019  8:00 AM CDT  PHYSICAL with Samy Marcelino MD  [facility] ([facility]) [address] [place] [id number] [phone]              ACE Inhibitors (Including Combos) Protocol Failed - 9/29/2019  7:47 AM        Failed - Blood pressure under 140/90 in past 12 months     BP Readings from Last 3 Encounters:   11/14/18 150/86   11/12/18 157/87   10/03/18 132/84                 Passed - Recent (12 mo) or future (30 days) visit within the authorizing provider's specialty     Patient has had an office visit with the authorizing provider or a provider within the authorizing providers department within the previous 12 mos or has a future within next 30 days. See \"Patient Info\" tab in inbasket, or \"Choose Columns\" in Meds & Orders section of the refill encounter.              Passed - Medication is active on med list        Passed - Patient is age 18 or older        Passed - Normal serum creatinine on file in past 12 months     Recent Labs   Lab Test 10/03/18  0843   CR 1.05             Passed - Normal serum potassium on file in past 12 months     Recent Labs   Lab Test 10/03/18  0843   POTASSIUM 4.1             metoprolol succinate ER (TOPROL-XL) 100 MG 24 hr tablet [Pharmacy Med Name: Metoprolol Succinate ER Oral Tablet Extended Release 24 Hour 100 MG] 90 tablet 0     Sig: TAKE ONE TABLET BY MOUTH ONE TIME DAILY  Last Written Prescription Date:  10/03/2018  Last Fill Quantity: 90,  # refills: 3   Last office visit: " "10/3/2018 with prescribing provider:  10/03/2018   Future Office Visit:   Next 5 appointments (look out 90 days)    Oct 09, 2019  8:00 AM CDT  PHYSICAL with Samy Marcelino MD  [facility] ([facility] [address] [place] [id number] [phone]              Beta-Blockers Protocol Failed - 9/29/2019  7:47 AM        Failed - Blood pressure under 140/90 in past 12 months     BP Readings from Last 3 Encounters:   11/14/18 150/86   11/12/18 157/87   10/03/18 132/84                 Passed - Patient is age 6 or older        Passed - Recent (12 mo) or future (30 days) visit within the authorizing provider's specialty     Patient has had an office visit with the authorizing provider or a provider within the authorizing providers department within the previous 12 mos or has a future within next 30 days. See \"Patient Info\" tab in inbasket, or \"Choose Columns\" in Meds & Orders section of the refill encounter.              Passed - Medication is active on med list        "

## 2019-10-01 RX ORDER — LISINOPRIL 40 MG/1
TABLET ORAL
Qty: 30 TABLET | Refills: 0 | Status: SHIPPED | OUTPATIENT
Start: 2019-10-01 | End: 2019-10-09

## 2019-10-01 RX ORDER — METOPROLOL SUCCINATE 100 MG/1
TABLET, EXTENDED RELEASE ORAL
Qty: 30 TABLET | Refills: 0 | Status: SHIPPED | OUTPATIENT
Start: 2019-10-01 | End: 2019-10-09

## 2019-10-01 NOTE — TELEPHONE ENCOUNTER
Medication is being filled for 1 time refill only due to:  Patient needs to be seen because due for appt. Has upcoming apt scheduled.

## 2019-10-04 ENCOUNTER — HEALTH MAINTENANCE LETTER (OUTPATIENT)
Age: 49
End: 2019-10-04

## 2019-10-09 ENCOUNTER — OFFICE VISIT (OUTPATIENT)
Dept: FAMILY MEDICINE | Facility: CLINIC | Age: 49
End: 2019-10-09
Payer: COMMERCIAL

## 2019-10-09 VITALS
TEMPERATURE: 98.1 F | HEART RATE: 87 BPM | SYSTOLIC BLOOD PRESSURE: 156 MMHG | HEIGHT: 72 IN | DIASTOLIC BLOOD PRESSURE: 96 MMHG | BODY MASS INDEX: 34.55 KG/M2 | WEIGHT: 255.1 LBS

## 2019-10-09 DIAGNOSIS — J45.30 MILD PERSISTENT ASTHMA WITHOUT COMPLICATION: ICD-10-CM

## 2019-10-09 DIAGNOSIS — Z00.00 ROUTINE GENERAL MEDICAL EXAMINATION AT A HEALTH CARE FACILITY: Primary | ICD-10-CM

## 2019-10-09 DIAGNOSIS — I10 ESSENTIAL HYPERTENSION WITH GOAL BLOOD PRESSURE LESS THAN 140/90: ICD-10-CM

## 2019-10-09 DIAGNOSIS — M10.00 IDIOPATHIC GOUT, UNSPECIFIED CHRONICITY, UNSPECIFIED SITE: ICD-10-CM

## 2019-10-09 DIAGNOSIS — Z98.890 HISTORY OF REPAIR OF MITRAL VALVE: ICD-10-CM

## 2019-10-09 LAB
ALBUMIN SERPL-MCNC: 4.6 G/DL (ref 3.4–5)
ALP SERPL-CCNC: 81 U/L (ref 40–150)
ALT SERPL W P-5'-P-CCNC: 57 U/L (ref 0–70)
ANION GAP SERPL CALCULATED.3IONS-SCNC: 10 MMOL/L (ref 3–14)
AST SERPL W P-5'-P-CCNC: 46 U/L (ref 0–45)
BILIRUB SERPL-MCNC: 0.8 MG/DL (ref 0.2–1.3)
BUN SERPL-MCNC: 15 MG/DL (ref 7–30)
CALCIUM SERPL-MCNC: 9.6 MG/DL (ref 8.5–10.1)
CHLORIDE SERPL-SCNC: 100 MMOL/L (ref 94–109)
CHOLEST SERPL-MCNC: 194 MG/DL
CO2 SERPL-SCNC: 26 MMOL/L (ref 20–32)
CREAT SERPL-MCNC: 1 MG/DL (ref 0.66–1.25)
ERYTHROCYTE [DISTWIDTH] IN BLOOD BY AUTOMATED COUNT: 12.1 % (ref 10–15)
GFR SERPL CREATININE-BSD FRML MDRD: 87 ML/MIN/{1.73_M2}
GLUCOSE SERPL-MCNC: 128 MG/DL (ref 70–99)
HCT VFR BLD AUTO: 43.4 % (ref 40–53)
HDLC SERPL-MCNC: 57 MG/DL
HGB BLD-MCNC: 15.6 G/DL (ref 13.3–17.7)
LDLC SERPL CALC-MCNC: 91 MG/DL
MCH RBC QN AUTO: 31.2 PG (ref 26.5–33)
MCHC RBC AUTO-ENTMCNC: 35.9 G/DL (ref 31.5–36.5)
MCV RBC AUTO: 87 FL (ref 78–100)
NONHDLC SERPL-MCNC: 137 MG/DL
PLATELET # BLD AUTO: 158 10E9/L (ref 150–450)
POTASSIUM SERPL-SCNC: 3.8 MMOL/L (ref 3.4–5.3)
PROT SERPL-MCNC: 7.9 G/DL (ref 6.8–8.8)
RBC # BLD AUTO: 5 10E12/L (ref 4.4–5.9)
SODIUM SERPL-SCNC: 136 MMOL/L (ref 133–144)
TRIGL SERPL-MCNC: 228 MG/DL
URATE SERPL-MCNC: 5.1 MG/DL (ref 3.5–7.2)
WBC # BLD AUTO: 7.4 10E9/L (ref 4–11)

## 2019-10-09 PROCEDURE — 90682 RIV4 VACC RECOMBINANT DNA IM: CPT | Performed by: FAMILY MEDICINE

## 2019-10-09 PROCEDURE — 90472 IMMUNIZATION ADMIN EACH ADD: CPT | Performed by: FAMILY MEDICINE

## 2019-10-09 PROCEDURE — 90670 PCV13 VACCINE IM: CPT | Performed by: FAMILY MEDICINE

## 2019-10-09 PROCEDURE — 36415 COLL VENOUS BLD VENIPUNCTURE: CPT | Performed by: FAMILY MEDICINE

## 2019-10-09 PROCEDURE — 84550 ASSAY OF BLOOD/URIC ACID: CPT | Performed by: FAMILY MEDICINE

## 2019-10-09 PROCEDURE — 80061 LIPID PANEL: CPT | Performed by: FAMILY MEDICINE

## 2019-10-09 PROCEDURE — 80053 COMPREHEN METABOLIC PANEL: CPT | Performed by: FAMILY MEDICINE

## 2019-10-09 PROCEDURE — 99396 PREV VISIT EST AGE 40-64: CPT | Mod: 25 | Performed by: FAMILY MEDICINE

## 2019-10-09 PROCEDURE — 85027 COMPLETE CBC AUTOMATED: CPT | Performed by: FAMILY MEDICINE

## 2019-10-09 PROCEDURE — 90471 IMMUNIZATION ADMIN: CPT | Performed by: FAMILY MEDICINE

## 2019-10-09 RX ORDER — LISINOPRIL 40 MG/1
TABLET ORAL
Qty: 90 TABLET | Refills: 3 | Status: SHIPPED | OUTPATIENT
Start: 2019-10-09 | End: 2020-10-09

## 2019-10-09 RX ORDER — ALLOPURINOL 300 MG/1
1 TABLET ORAL DAILY
Qty: 90 TABLET | Refills: 3 | Status: SHIPPED | OUTPATIENT
Start: 2019-10-09 | End: 2020-10-06

## 2019-10-09 RX ORDER — AMOXICILLIN 500 MG/1
CAPSULE ORAL
Qty: 12 CAPSULE | Refills: 3 | Status: SHIPPED | OUTPATIENT
Start: 2019-10-09 | End: 2023-06-05

## 2019-10-09 RX ORDER — METOPROLOL SUCCINATE 100 MG/1
TABLET, EXTENDED RELEASE ORAL
Qty: 90 TABLET | Refills: 3 | Status: SHIPPED | OUTPATIENT
Start: 2019-10-09 | End: 2020-07-01

## 2019-10-09 RX ORDER — AMLODIPINE BESYLATE 10 MG/1
10 TABLET ORAL DAILY
Qty: 90 TABLET | Refills: 3 | Status: SHIPPED | OUTPATIENT
Start: 2019-10-09 | End: 2020-10-06

## 2019-10-09 RX ORDER — ALBUTEROL SULFATE 90 UG/1
2 AEROSOL, METERED RESPIRATORY (INHALATION) EVERY 6 HOURS PRN
Qty: 1 INHALER | Refills: 11 | Status: SHIPPED | OUTPATIENT
Start: 2019-10-09 | End: 2020-08-16

## 2019-10-09 ASSESSMENT — MIFFLIN-ST. JEOR: SCORE: 2049

## 2019-10-09 NOTE — PROGRESS NOTES
SUBJECTIVE:   CC: Tremaine King is an 49 year old male who presents for preventive health visit.     Healthy Habits:    Do you get at least three servings of calcium containing foods daily (dairy, green leafy vegetables, etc.)? yes    Amount of exercise or daily activities, outside of work: not enmough    Problems taking medications regularly No    Medication side effects: No    Have you had an eye exam in the past two years? yes    Do you see a dentist twice per year? no    Do you have sleep apnea, excessive snoring or daytime drowsiness?    Asthma  He notes that his asthma has been more symptomatic in the last week due to stress. He will notice some irritation in his lungs when working in the garden. Patient takes albuterol prn, as well as Qvar daily for maintenance control.    Cardiovascular  Patient has not seen cardiology recently, states that he is due for a stress echo. Patient plans on scheduling an echo after he gets in better physical shape. Reports that the last time he saw cardiology they discussed his BP, notes that the diuretic he wast taking was causing muscle cramps. Patient states that his BP is typically higher when measured here in the clinic, is lower when measured at home.    Neuro  States that he experiences a fine tremor in his hands when he gets nervous.     Diet/Exercise  Patient states that he has gained some weight recently due to stress. He acknowledges eating too much fast food. Patient is planning on beginning an endurance program to try and lose weight.    Social History  His father went to the ER last night which has been stressful for him.    Colon Cancer Screening  He would like to complete FIT testing.    Immunizations  Patient would like a flu shot.    Today's PHQ-2 Score:   PHQ-2 ( 1999 [employer]) 10/9/2019 10/2/2018   Q1: Little interest or pleasure in doing things 0 0   Q2: Feeling down, depressed or hopeless 0 0   PHQ-2 Score 0 0   Q1: Little interest or pleasure in doing  things - Not at all   Q2: Feeling down, depressed or hopeless - Not at all   PHQ-2 Score - 0       Abuse: Current or Past(Physical, Sexual or Emotional)-  Not discussed.  Do you feel safe in your environment? Not discussed.    Social History     Tobacco Use     Smoking status: Former Smoker     Types: Cigars     Smokeless tobacco: Never Used     Tobacco comment: quit smoking cigarettes and cigars at ~ age 22 (was light smoker for about 4 years))   Substance Use Topics     Alcohol use: Yes     Comment: 1-2/day on weekdays, 2-3/day on wknds     If you drink alcohol do you typically have >3 drinks per day or >7 drinks per week?                       Last PSA:   PSA   Date Value Ref Range Status   10/03/2018 1.95 0 - 4 ug/L Final     Comment:     Assay Method:  Chemiluminescence using Siemens Vista analyzer       Reviewed orders with patient. Reviewed health maintenance and updated orders accordingly - Yes  Labs reviewed in EPIC  BP Readings from Last 3 Encounters:   10/09/19 (!) 156/96   11/14/18 150/86   11/12/18 157/87    Wt Readings from Last 3 Encounters:   10/09/19 115.7 kg (255 lb 1.6 oz)   11/14/18 107 kg (235 lb 12.8 oz)   11/12/18 107.5 kg (237 lb)                  Patient Active Problem List   Diagnosis     History of repair of mitral valve     CARDIOVASCULAR SCREENING; LDL GOAL LESS THAN 130     ASD (atrial septal defect)     Essential hypertension with goal blood pressure less than 140/90     Idiopathic gout, unspecified chronicity, unspecified site     BMI 35.0-35.9,adult     KOTA (obstructive sleep apnea)     Lumbar disc herniation     Past Surgical History:   Procedure Laterality Date     REPAIR ATRIAL SEPTAL DEFECT  AGE 3     REPAIR VALVE MITRAL  AGE 3 AND JUNE 2005    annuloplasty ring and MVR in June 2005     REPAIR VALVE MITRAL         Social History     Tobacco Use     Smoking status: Former Smoker     Types: Cigars     Smokeless tobacco: Never Used     Tobacco comment: quit smoking cigarettes and  cigars at ~ age 22 (was light smoker for about 4 years))   Substance Use Topics     Alcohol use: Yes     Comment: 1-2/day on weekdays, 2-3/day on wknds     Family History   Problem Relation Age of Onset     Diabetes Mother         onset past age 70         Current Outpatient Medications   Medication Sig Dispense Refill     albuterol (PROAIR HFA) 108 (90 Base) MCG/ACT inhaler Inhale 2 puffs into the lungs every 6 hours as needed for shortness of breath / dyspnea 1 Inhaler 11     allopurinol (ZYLOPRIM) 300 MG tablet TAKE ONE TABLET BY MOUTH ONE TIME DAILY  90 tablet 1     amLODIPine (NORVASC) 10 MG tablet Take 1 tablet (10 mg) by mouth daily 90 tablet 3     aspirin 325 MG tablet Take 81 mg by mouth daily        beclomethasone HFA (QVAR REDIHALER) 80 MCG/ACT inhaler Inhale 1 puff into the lungs 2 times daily 10.6 g 11     IBUPROFEN PO Take 400 mg by mouth 2 times daily as needed.         lisinopril (PRINIVIL/ZESTRIL) 40 MG tablet TAKE ONE TABLET BY MOUTH ONE TIME DAILY 30 tablet 0     metoprolol succinate ER (TOPROL-XL) 100 MG 24 hr tablet TAKE ONE TABLET BY MOUTH ONE TIME DAILY 30 tablet 0     order for DME Equipment being ordered: CPAP  Patient Tremaine King was set up at [facility] on September 14, 2015. Patient received a [employer] DreamStation Auto CPAP Auto. Pressures were set at Auto 12 - 12 cm H2O.   Patient s ramp is 9 cm H2O for Auto and FLEX/EPR is A Flex.  Patient received a [employer] Mask name: BLUE GEL NASAL  Nasal mask Size Medium, heated tubing and heated humidifier.  Patient is not enrolled in the [facility] and does not need to meet compliance. Samina Arora       amoxicillin (AMOXIL) 500 MG capsule Please take a total of 2 grams (four 500 mg capsules) 30-60 minutes prior to dental procedure. (Patient not taking: Reported on 10/9/2019) 12 capsule 3     No Known Allergies    Reviewed and updated as needed this visit by clinical staff  Tobacco  Allergies  Meds         Reviewed  "and updated as needed this visit by Provider        Past Medical History:   Diagnosis Date     ASD (atrial septal defect)     s/p repair     Lumbar disc herniation 05/2018    better after steroid inj and PT      Past Surgical History:   Procedure Laterality Date     REPAIR ATRIAL SEPTAL DEFECT  AGE 3     REPAIR VALVE MITRAL  AGE 3 AND JUNE 2005    annuloplasty ring and MVR in June 2005     REPAIR VALVE MITRAL         ROS:  Denies headache, insomnia, chest pain, shortness of breath, cough, heartburn, bowel issues, bladder issues, neck pain, back pain, hip pain, knee pain, ankle pain, or foot pain. Remainder of ROS is negative unless otherwise noted above or in HPI.    This document serves as a record of the services and decisions personally performed and made by Samy Marcelino MD. It was created on his behalf by Bertin Rowley, trained medical scribe. The creation of this document is based on the provider's statements to the medical scribe.  Bertin Rowley 8:19 AM October 9, 2019    OBJECTIVE:   BP (!) 156/96   Pulse 87   Temp 98.1  F (36.7  C) (Oral)   Ht 1.827 m (5' 11.93\")   Wt 115.7 kg (255 lb 1.6 oz)   BMI 34.67 kg/m    EXAM:  GENERAL: healthy, alert and no distress  EYES: Eyes grossly normal to inspection, PERRL and conjunctivae and sclerae normal  HENT: ear canals and TM's normal, nose and mouth without ulcers or lesions  NECK: no adenopathy, no asymmetry, masses, or scars and thyroid normal to palpation  RESP: lungs clear to auscultation - no rales, rhonchi or wheezes  CV: quiet mid systolic murmur, regular rate and rhythm, normal S1 S2, no S3 or S4, no click or rub, no peripheral edema and peripheral pulses strong  ABDOMEN: soft, nontender, no hepatosplenomegaly, no masses and bowel sounds normal   (male): normal male genitalia without lesions or urethral discharge, no hernia  RECTAL: normal sphincter tone, no rectal masses, prostate normal size, smooth, nontender without nodules or masses  MS: no gross " musculoskeletal defects noted, trace of edema in bilateral legs  SKIN: no suspicious lesions or rashes  NEURO: Normal strength and tone, mentation intact and speech normal, mild tremor in the hands  PSYCH: mentation appears normal, affect normal/bright    Diagnostic Test Results:  Labs reviewed in Epic  No results found for this or any previous visit (from the past 24 hour(s)).    ASSESSMENT/PLAN:   (Z00.00) Routine general medical examination at a health care facility  (primary encounter diagnosis)  Comment: Patient is doing well. Routine physical and lab work completed.  Plan: C RIV4 (FLUBLOK) VACCINE RECOMBINANT DNA PRSRV         ANTIBIO FREE, IM [79015], Fecal colorectal         cancer screen FIT        Will notify with results. Follow up as needed.    (I10) Essential hypertension with goal blood pressure less than 140/90  Comment: Not at goal, patient notes his BP is typically higher when measured in clinic.  Plan: amLODIPine (NORVASC) 10 MG tablet, lisinopril         (PRINIVIL/ZESTRIL) 40 MG tablet, metoprolol         succinate ER (TOPROL-XL) 100 MG 24 hr tablet,         Comprehensive metabolic panel, Lipid panel         reflex to direct LDL Fasting        Continue taking medication. Follow up as needed.    (J45.30) Mild persistent asthma without complication  Comment: Stable. Controlled by current medication.  Plan: albuterol (PROAIR HFA) 108 (90 Base) MCG/ACT         inhaler, beclomethasone HFA (QVAR REDIHALER) 80        MCG/ACT inhaler        Continue taking medication. Follow up as needed.    (M10.00) Idiopathic gout, unspecified chronicity, unspecified site  Comment: Stable. Controlled by current medication.  Plan: allopurinol (ZYLOPRIM) 300 MG tablet, CBC with         platelets, Uric acid        Continue taking medication. Follow up as needed.    (Z98.890) History of repair of mitral valve  Comment: Stable. Patient has follow ups with cardiology.  Plan: amoxicillin (AMOXIL) 500 MG capsule        Continue  "seeing cardiology.      Patient Instructions     Preventive Health Recommendations  Male Ages 50 - 64    Yearly exam:             See your health care provider every year in order to  o   Review health changes.   o   Discuss preventive care.    o   Review your medicines if your doctor has prescribed any.     Have a cholesterol test every 5 years, or more frequently if you are at risk for high cholesterol/heart disease.     Have a diabetes test (fasting glucose) every three years. If you are at risk for diabetes, you should have this test more often.     Have a colonoscopy at age 50, or have a yearly FIT test (stool test). These exams will check for colon cancer.      Talk with your health care provider about whether or not a prostate cancer screening test (PSA) is right for you.    You should be tested each year for STDs (sexually transmitted diseases), if you re at risk.     Shots: Get a flu shot each year. Get a tetanus shot every 10 years.     Nutrition:    Eat at least 5 servings of fruits and vegetables daily.     Eat whole-grain bread, whole-wheat pasta and brown rice instead of white grains and rice.     Get adequate Calcium and Vitamin D.     Lifestyle    Exercise for at least 150 minutes a week (30 minutes a day, 5 days a week). This will help you control your weight and prevent disease.     Limit alcohol to one drink per day.     No smoking.     Wear sunscreen to prevent skin cancer.     See your dentist every six months for an exam and cleaning.     See your eye doctor every 1 to 2 years.         COUNSELING:  Reviewed preventive health counseling, as reflected in patient instructions       Regular exercise       Healthy diet/nutrition       Vision screening       Immunizations    Vaccinated for: Influenza         Colon cancer screening       Prostate cancer screening    Estimated body mass index is 34.67 kg/m  as calculated from the following:    Height as of this encounter: 1.827 m (5' 11.93\").    Weight " as of this encounter: 115.7 kg (255 lb 1.6 oz).    Weight management plan: Discussed healthy diet and exercise guidelines     reports that he has quit smoking. His smoking use included cigars. He has never used smokeless tobacco.      The information in this document, created by the medical scribe for me, accurately reflects the services I personally performed and the decisions made by me. I have reviewed and approved this document for accuracy prior to leaving the patient care area.  October 9, 2019 8:20 AM    Samy Marcelino MD  [facility] [place]

## 2019-10-13 NOTE — RESULT ENCOUNTER NOTE
Shree Penaloza: Your recent results show lipid panel to be slightly worse, along with minimally elevated AST again. Reduce simple carbs, alcohol, recheck in 1 year. Contact if questions, nice to see you!     Samy    The 10-year ASCVD risk score (Malgorzata CHRISTIANSON Jr., et al., 2013) is: 5.3%    Values used to calculate the score:      Age: 49 years      Sex: Male      Is Non- : No      Diabetic: No      Tobacco smoker: No      Systolic Blood Pressure: 156 mmHg      Is BP treated: Yes      HDL Cholesterol: 57 mg/dL      Total Cholesterol: 194 mg/dL

## 2020-01-08 ENCOUNTER — ANCILLARY PROCEDURE (OUTPATIENT)
Dept: CARDIOLOGY | Facility: CLINIC | Age: 50
End: 2020-01-08
Payer: COMMERCIAL

## 2020-01-08 VITALS — SYSTOLIC BLOOD PRESSURE: 157 MMHG | DIASTOLIC BLOOD PRESSURE: 95 MMHG

## 2020-01-08 DIAGNOSIS — Z98.890 HISTORY OF REPAIR OF MITRAL VALVE: Primary | ICD-10-CM

## 2020-01-08 DIAGNOSIS — Q21.10 ASD (ATRIAL SEPTAL DEFECT): ICD-10-CM

## 2020-01-08 DIAGNOSIS — Z98.890 HISTORY OF REPAIR OF MITRAL VALVE: ICD-10-CM

## 2020-01-08 NOTE — PROGRESS NOTES
Date: 2020    Time of Call: 8:27 AM     Diagnosis:  S/p MVR and ASD repair     [ Standing ] Ordering provider: Dr Tim Gotti  Order: Echo complete     Order received by: Teresa Reynoso LPN     Follow-up/additional notes: New order needed as old .  Pt here for appointment.

## 2020-01-29 ENCOUNTER — OFFICE VISIT (OUTPATIENT)
Dept: CARDIOLOGY | Facility: CLINIC | Age: 50
End: 2020-01-29
Attending: INTERNAL MEDICINE
Payer: COMMERCIAL

## 2020-01-29 VITALS
BODY MASS INDEX: 35.66 KG/M2 | HEIGHT: 71 IN | SYSTOLIC BLOOD PRESSURE: 151 MMHG | HEART RATE: 98 BPM | WEIGHT: 254.7 LBS | OXYGEN SATURATION: 98 % | DIASTOLIC BLOOD PRESSURE: 84 MMHG

## 2020-01-29 DIAGNOSIS — I10 HYPERTENSION, UNSPECIFIED TYPE: Primary | ICD-10-CM

## 2020-01-29 DIAGNOSIS — Z13.6 CARDIOVASCULAR SCREENING; LDL GOAL LESS THAN 130: ICD-10-CM

## 2020-01-29 DIAGNOSIS — Z98.890 HISTORY OF REPAIR OF MITRAL VALVE: ICD-10-CM

## 2020-01-29 DIAGNOSIS — Q21.10 ASD (ATRIAL SEPTAL DEFECT): ICD-10-CM

## 2020-01-29 PROCEDURE — 99214 OFFICE O/P EST MOD 30 MIN: CPT | Mod: ZP | Performed by: INTERNAL MEDICINE

## 2020-01-29 RX ORDER — SPIRONOLACTONE 25 MG/1
12.5 TABLET ORAL DAILY
Qty: 45 TABLET | Refills: 1 | Status: SHIPPED | OUTPATIENT
Start: 2020-01-29 | End: 2020-02-24

## 2020-01-29 RX ORDER — SPIRONOLACTONE 25 MG/1
12.5 TABLET ORAL DAILY
Qty: 90 TABLET | Refills: 3 | Status: SHIPPED | OUTPATIENT
Start: 2020-01-29 | End: 2020-07-01 | Stop reason: SINTOL

## 2020-01-29 ASSESSMENT — PAIN SCALES - GENERAL: PAINLEVEL: NO PAIN (0)

## 2020-01-29 ASSESSMENT — MIFFLIN-ST. JEOR: SCORE: 2032.44

## 2020-01-29 NOTE — LETTER
1/29/2020      RE: Tremaine SHEETS Christine  [address] [place]  [facility] [address]       Dear Colleague,    Thank you for the opportunity to participate in the care of your patient, Tremaine King, at the [facility]. Please see a copy of my visit note below.    [id number]      Please do not hesitate to contact me if you have any questions/concerns.     Sincerely,     Tim Gotti MD

## 2020-01-29 NOTE — PROGRESS NOTES
Service Date: 2020      Samy Marcelino MD   [address] [address]      RE: Tremaine King    MRN: [id number]   : 1970      Dear Dr. Marcelino:       It was a pleasure participating in the care of your patient Mr. Tremaine King.  As you know, he is a 49-year-old gentleman whom I see today for a history of mitral valve repair and hypertension.      PAST MEDICAL HISTORY:    1.  Migraine headaches.   2.  Gout.   3.  Seasonal allergies with verbal history of mild asthma.   4.  Meniscus tear of the knee.   5.  History of morbid obesity with significant weight loss due to diet and exercise.   6.  Sleep apnea treated with CPAP since .      His cardiac history is significant for having had an ASD repair along with a mitral valve repair at age 3.  Subsequent mitral insufficiency required a mitral valve repair with an annuloplasty ring in  by Dr. Hernandez.  His postop course was complicated by a high resting pulse, and Dr. Diehl performed an EP study, which did not induce significant arrhythmias, and he was diagnosed as having inappropriate sinus tachycardia.  He was medically managed.      I last saw him 2018.  At that time he was doing adequately.  He presents today for continuing care.      Since our last visit, he has done very well.  He has had to find a new job, his mother had fallen and had some hip problems, and he is currently dealing with short-term memory loss in his father, who is also a paraplegic.  Usually he does vigorous exercise on his bike, but he has not been biking for about 3 weeks.  When he did go 3 weeks ago, although out of shape, he felt well and did not have any new symptoms biking.  He has not noticed any new chest pain, shortness of breath, PND, orthopnea, edema, palpitations, syncope or near syncope.      REVIEW OF SYSTEMS:  Positive for just some mildly elevated blood pressures in the 130s-140s, otherwise unremarkable.     "  PRESENT MEDICATIONS:     1.  Amlodipine 10 mg a day.   2.  Aspirin 325 mg a day.   3.  Lisinopril 40 mg a day.   4.  Metoprolol succinate 100 mg a day.      PHYSICAL EXAMINATION:     VITAL SIGNS:  His blood pressure is 150/84 with a pulse of 98.  His weight is 254 pounds.   NECK:  Normal jugular venous pressure.   LUNGS:  Clear to auscultation.  Respiratory effort is normal.   CARDIAC:  Regular rate and rhythm.  A 2/6 systolic ejection murmur.  There is a 1/6 diastolic murmur present as well.   ABDOMEN:  Belly is soft and nontender.   EXTREMITIES:  Without gross edema.      IMAGING:  Echocardiogram 01/08/2020 reveals an ejection fraction of 55%-60% with a mean gradient of 11, RV systolic pressure of 30 plus RA pressure, dilated IVC, no change from 11/07/2018.  ASD repair was \"okay.\"      LABORATORY:  On 10/09/2019, potassium 3.8, GFR normal.        IMPRESSION:      Tremaine is a 49-year-old gentleman whose cardiac history is significant for having had an atrial septal defect and mitral valve repair at age 3 with a mitral annuloplasty ring in 2005 for mitral insufficiency.      He has several active issues:     1.  Mitral valve repair.  He is exertionally unlimited and usually does a high level of biking activity, but has not been doing this recently due to taking care of his elderly parents.      We have followed his elevated mitral gradient over the years, but it is unchanged by recent echo 01/08/2020.  Obviously, we will continue to monitor hemodynamics and clinical symptoms, and if he should have new symptoms secondary to his valvular pathology, this would lead us to a more aggressive approach at that time.     2.  Hypertension.      Blood pressures have been running in the 130s-140s.  He does not tolerate thiazide diuretics due to hypokalemia.  We will attempt to optimize therapy.        PLAN:     1.  Add spironolactone 12.5 mg a day.     2.  Check Chem 7 in 2 weeks.       3.  Phone followup in 3 weeks for blood " pressures, pulse and progress.     4.  Follow up in 6 months with a fasting lipid profile and Chem 7 and with a repeat echo 1 year or earlier if needed.      Once again, it was a pleasure participating in the care of your patient Mr. Tremaine Horn.  Please feel free to contact me at any time if you have any questions regarding his care in the future.      Sincerely,      Tim Gotti MD               D: 2020   T: 2020   MT: ericka      Name:     TREMAINE HORN   MRN:      [id number]        Account:      [id number]   :      1970           Service Date: 2020      Document: [id number]

## 2020-01-29 NOTE — NURSING NOTE
Cardiac Testing: Patient given instructions regarding  echocardiogram . Discussed purpose, preparation, procedure and when to expect results reported back to the patient. Patient demonstrated understanding of this information and agreed to call with further questions or concerns.  Labs: Patient was given results of the laboratory testing obtained today. Patient was instructed to return for the next laboratory testing in 2-3 weeks and again in 6 months. Patient demonstrated understanding of this information and agreed to call with further questions or concerns.   Med Reconcile: Reviewed and verified all current medications with the patient. The updated medication list was printed and given to the patient.  New Medication: Patient was educated regarding newly prescribed medication, including discussion of  the indication, administration, side effects, and when to report to MD or RN. Patient demonstrated understanding of this information and agreed to call with further questions or concerns.  Return Appointment: Patient given instructions regarding scheduling next clinic visit. Patient demonstrated understanding of this information and agreed to call with further questions or concerns.  Patient stated he understood all health information given and agreed to call with further questions or concerns.    Teresa Reynoso LPN

## 2020-01-29 NOTE — LETTER
2020       RE: Tremaine King  [address]  [facility] [address]     Dear Colleague,    Thank you for referring your patient, Tremaine King, to the [facility] HEART CARE at [facility]. Please see a copy of my visit note below.    [id number]      Service Date: 2020      Samy Marcelino MD   [address] [address]      RE: Tremaine King    MRN: [id number]   : 1970      Dear Dr. Marcelino:       It was a pleasure participating in the care of your patient Mr. Tremaine King.  As you know, he is a 49-year-old gentleman whom I see today for a history of mitral valve repair and hypertension.      PAST MEDICAL HISTORY:  Significant for the followin.  Migraine headaches.   2.  Gout.   3.  Seasonal allergies with verbal history of mild asthma.   4.  Meniscus tear of the knee.   5.  History of morbid obesity with significant weight loss due to diet and exercise.   6.  Sleep apnea treated with CPAP since .      His cardiac history is significant for having had an ASD repair along with a mitral valve repair at age 3.  Subsequent mitral insufficiency required a mitral valve repair with an annuloplasty ring in  by Dr. Hernandez.  His postop course was complicated by a high resting pulse, and Dr. Diehl performed an EP study, which did not induce significant arrhythmias, and he was diagnosed as having inappropriate sinus tachycardia.  He was medically managed.      I last saw him 2018.  At that time he was doing adequately.  He presents today for continuing care.      Since our last visit, he has done very well.  He has had to find a new job, his mother had fallen and had some hip problems, and he is currently dealing with short-term memory loss in his father, who is also a paraplegic.  Usually he does vigorous exercise on his bike, but he has not been biking for about 3 weeks.  When he did go 3 weeks ago,  "although out of shape, he felt well and did not have any new symptoms biking.  He has not noticed any new chest pain, shortness of breath, PND, orthopnea, edema, palpitations, syncope or near syncope.      REVIEW OF SYSTEMS:  Positive for just some mildly elevated blood pressures in the 130s-140s, otherwise unremarkable.      PRESENT MEDICATIONS:   1.  Amlodipine 10 mg a day.   2.  Aspirin 325 mg a day.   3.  Lisinopril 40 mg a day.   4.  Metoprolol succinate 100 mg a day.      PHYSICAL EXAMINATION:   VITAL SIGNS:  His blood pressure is 150/84 with a pulse of 98.  His weight is 254 pounds.   NECK:  Normal jugular venous pressure.   LUNGS:  Clear to auscultation.  Respiratory effort is normal.   CARDIAC:  Regular rate and rhythm.  A 2/6 systolic ejection murmur.  There is a 1/6 diastolic murmur present as well.   ABDOMEN:  Belly is soft and nontender.   EXTREMITIES:  Without gross edema.      IMAGING:  Echocardiogram 01/08/2020 reveals an ejection fraction of 55%-60% with a mean gradient of 11, RV systolic pressure of 30 plus RA pressure, dilated IVC, no change from 11/07/2018.  ASD repair was \"okay.\"      LABORATORY:  On 10/09/2019, potassium 3.8, GFR normal.      IMPRESSION:  Tremaine is a 49-year-old gentleman whose cardiac history is significant for having had an atrial septal defect and mitral valve repair at age 3 with a mitral annuloplasty ring in 2005 for mitral insufficiency.      He has several active issues:   1.  Mitral valve repair.  He is exertionally unlimited and usually does a high level of biking activity, but has not been doing this recently due to taking care of his elderly parents.      We have followed his elevated mitral gradient over the years, but it is unchanged by recent echo 01/08/2020.  Obviously, we will continue to monitor hemodynamics and clinical symptoms, and if he should have new symptoms secondary to his valvular pathology, this would lead us to a more aggressive approach at that time. "   2.  Hypertension.  Blood pressures have been running in the 130s-140s.  He does not tolerate thiazide diuretics due to hypokalemia.  We will attempt to optimize therapy.      PLAN:   1.  Add spironolactone 12.5 mg a day.   2.  Check Chem 7 in 2 weeks.     3.  Phone followup in 3 weeks for blood pressures, pulse and progress.   4.  Follow up in 6 months with a fasting lipid profile and Chem 7 and with a repeat echo 1 year or earlier if needed.      Once again, it was a pleasure participating in the care of your patient Mr. Brooke Horn.  Please feel free to contact me at any time if you have any questions regarding his care in the future.      Sincerely,      MD JC Hunter MD             D: 2020   T: 2020   MT: [email or web address]      Name:     BROOKE HORN   MRN:      [id number]        Account:      [id number]   :      1970           Service Date: 2020      Document: [id number]       Again, thank you for allowing me to participate in the care of your patient.      Sincerely,    Jc Gotti MD

## 2020-01-29 NOTE — PATIENT INSTRUCTIONS
Patient Instructions:  It was a pleasure to see you in the cardiology clinic today.      If you have any questions, you can reach my nurse, Teresa NULL LPN, at [phone].  Press Option #1 for the [facility], and then press Option #4 for nursing.    We are encouraging the use of [employer] to communicate with your HealthCare Provider    Medication Changes: Start Spironolactone 12.5 mg every day.    Recommendations:   - Complete non-fasting labs in 2-3 weeks.   - Monitor and record home blood pressures and pulse daily.  Please attempt to do this 2-3 hours after taking your blood pressure medications and after resting for 5-10 minutes.  Contact clinic with 1-2 weeks worth of home readings.    Studies Ordered: Echocardiogram in one year 1-2 days prior to clinic follow up.    The results from today include: Echocardiogram.    Please follow up: With Dr. oGtti in 6 months with fasting labs prior.    Sincerely,    Tim Gotti MD     If you have an urgent need after hours (8:00 am to 4:30 pm) please call [phone] and ask for the cardiology fellow on call.

## 2020-02-17 DIAGNOSIS — I10 HYPERTENSION, UNSPECIFIED TYPE: ICD-10-CM

## 2020-02-17 PROCEDURE — 80048 BASIC METABOLIC PNL TOTAL CA: CPT | Performed by: INTERNAL MEDICINE

## 2020-02-17 PROCEDURE — 36415 COLL VENOUS BLD VENIPUNCTURE: CPT | Performed by: INTERNAL MEDICINE

## 2020-02-18 LAB
ANION GAP SERPL CALCULATED.3IONS-SCNC: 7 MMOL/L (ref 3–14)
BUN SERPL-MCNC: 24 MG/DL (ref 7–30)
CALCIUM SERPL-MCNC: 9.3 MG/DL (ref 8.5–10.1)
CHLORIDE SERPL-SCNC: 103 MMOL/L (ref 94–109)
CO2 SERPL-SCNC: 24 MMOL/L (ref 20–32)
CREAT SERPL-MCNC: 1.44 MG/DL (ref 0.66–1.25)
GFR SERPL CREATININE-BSD FRML MDRD: 56 ML/MIN/{1.73_M2}
GLUCOSE SERPL-MCNC: 98 MG/DL (ref 70–99)
POTASSIUM SERPL-SCNC: 4.4 MMOL/L (ref 3.4–5.3)
SODIUM SERPL-SCNC: 134 MMOL/L (ref 133–144)

## 2020-04-08 ENCOUNTER — MYC REFILL (OUTPATIENT)
Dept: FAMILY MEDICINE | Facility: CLINIC | Age: 50
End: 2020-04-08

## 2020-04-08 DIAGNOSIS — J45.30 MILD PERSISTENT ASTHMA WITHOUT COMPLICATION: ICD-10-CM

## 2020-04-08 RX ORDER — ALBUTEROL SULFATE 90 UG/1
2 AEROSOL, METERED RESPIRATORY (INHALATION) EVERY 6 HOURS PRN
Qty: 1 INHALER | Refills: 11 | Status: CANCELLED | OUTPATIENT
Start: 2020-04-08

## 2020-04-09 NOTE — TELEPHONE ENCOUNTER
Spoke with patient. He got a letter saying he should refill his inhalers. Told him he got new Rx's and refills in 10/2019 that should be good for one year. Verbalized understanding    Brianda Padron RN   [facility]

## 2020-07-01 ENCOUNTER — TELEPHONE (OUTPATIENT)
Dept: CARDIOLOGY | Facility: CLINIC | Age: 50
End: 2020-07-01

## 2020-07-01 ENCOUNTER — VIRTUAL VISIT (OUTPATIENT)
Dept: CARDIOLOGY | Facility: CLINIC | Age: 50
End: 2020-07-01
Attending: INTERNAL MEDICINE
Payer: COMMERCIAL

## 2020-07-01 DIAGNOSIS — I10 HYPERTENSION, UNSPECIFIED TYPE: ICD-10-CM

## 2020-07-01 DIAGNOSIS — I10 ESSENTIAL HYPERTENSION WITH GOAL BLOOD PRESSURE LESS THAN 140/90: ICD-10-CM

## 2020-07-01 DIAGNOSIS — Q21.10 ASD (ATRIAL SEPTAL DEFECT): ICD-10-CM

## 2020-07-01 DIAGNOSIS — Z98.890 HISTORY OF REPAIR OF MITRAL VALVE: ICD-10-CM

## 2020-07-01 LAB
ANION GAP SERPL CALCULATED.3IONS-SCNC: 11 MMOL/L (ref 3–14)
BUN SERPL-MCNC: 19 MG/DL (ref 7–30)
CALCIUM SERPL-MCNC: 9 MG/DL (ref 8.5–10.1)
CHLORIDE SERPL-SCNC: 91 MMOL/L (ref 94–109)
CO2 SERPL-SCNC: 24 MMOL/L (ref 20–32)
CREAT SERPL-MCNC: 1.26 MG/DL (ref 0.66–1.25)
GFR SERPL CREATININE-BSD FRML MDRD: 65 ML/MIN/{1.73_M2}
GLUCOSE SERPL-MCNC: 97 MG/DL (ref 70–99)
POTASSIUM SERPL-SCNC: 4.2 MMOL/L (ref 3.4–5.3)
SODIUM SERPL-SCNC: 126 MMOL/L (ref 133–144)

## 2020-07-01 PROCEDURE — 99213 OFFICE O/P EST LOW 20 MIN: CPT | Mod: 95 | Performed by: INTERNAL MEDICINE

## 2020-07-01 PROCEDURE — 80048 BASIC METABOLIC PNL TOTAL CA: CPT | Performed by: INTERNAL MEDICINE

## 2020-07-01 PROCEDURE — 36415 COLL VENOUS BLD VENIPUNCTURE: CPT | Performed by: INTERNAL MEDICINE

## 2020-07-01 RX ORDER — METOPROLOL SUCCINATE 100 MG/1
TABLET, EXTENDED RELEASE ORAL
Qty: 180 TABLET | Refills: 3 | Status: SHIPPED | OUTPATIENT
Start: 2020-07-01 | End: 2021-10-04

## 2020-07-01 NOTE — TELEPHONE ENCOUNTER
[facility] Call [facility]    Phone Message    May a detailed message be left on voicemail: yes     Reason for Call: Other: Gloria calling to request a call back. She needs clarification on metoprolol succinate ER (TOPROL-XL) 100 MG 24 hr tablet . Please call her back to discuss.      Action Taken: Message routed to:  [facility] cardio     Travel Screening: Not Applicable

## 2020-07-01 NOTE — LETTER
2020      RE: Tremaine King  [address]  [facility] [address]       Dear Colleague,    Thank you for the opportunity to participate in the care of your patient, Tremaine King, at the [facility] HEART [facility] at [facility]. Please see a copy of my visit note below.    Tremaine King is a 49 year old male who is being evaluated via a billable video visit.      Service Date: 2020      VIRTUAL VIDEO VISIT      Samy Marcelino MD    [facility]   [address]   [place] [address]       RE:    Tremaine King   MRN:  [id number]   :        Dear Dr. Marcelino:        It was a pleasure participating in the care of your patient, . Tremaine King.  As you know, he is a 49-year-old gentleman whom I saw today over virtual video visit for mitral valve repair and hypertension.      His past medical history is significant for the followin.  Migraine headaches.   2.  Gout.   3.  Seasonal allergies with verbal history of mild asthma.   4.  Meniscus tear of the knee.   5.  History of morbid obesity with significant weight loss due to diet and exercise in the past.   6.  Sleep apnea, treated with CPAP since .      His cardiac history is significant for having had an ASD repair along with mitral valve repair at age 3.  Subsequent mitral insufficiency required a mitral valve repair with annuloplasty ring in  by Dr. Hernandez.  His postop course was complicated by high resting pulse and Dr. Diehl performed an EP study, which did not induce significant arrhythmias and he was diagnosed as having inappropriate sinus tachycardia which was medically managed.      I last saw him 2020, and at that time he was doing adequately.  He presents today for continuing care.      Since our last visit, he has done adequately.  He continues to spend a great deal of time taking care of his elderly parents, in particular, his father who is paraplegic and had an  "amputation recently.  He has not been biking as much as he would like to, but when he does get out, he does not notice any new exertional symptoms.  He walks every other day for about an hour and feels good and usually has to stop secondary to a herniated disk in his back and generalized back pain.      He denies any new chest pain, shortness of breath, PND, orthopnea, edema, palpitations, syncope or near-syncope.      REVIEW OF SYSTEMS:  Positive for his back pain.      He notes that his baseline blood pressures, now that he has started spironolactone, have been running in the 120s/70s at home.      CURRENT MEDICATIONS:     1.  Amlodipine 10 mg a day.   2.  Aspirin 81 mg a day.   3.  Lisinopril 40 mg a day.   4.  Metoprolol succinate 100 mg a day.   5.  Spironolactone 12.5 mg a day.      PHYSICAL EXAMINATION:     VITAL SIGNS:  Blood pressures at home generally run in the 120s/70s.   PSYCHIATRIC:  She is alert and oriented.   GENERAL:  He is comfortable, well groomed.   EYES:  Do not reveal gross erythema or exudate.   RESPIRATORY:  He is breathing comfortably without gross cough.      The remainder of the comprehensive physical exam was deferred secondary to the COVID-19 pandemic and secondary to video visit restrictions.      His echocardiogram as of 01/08/2020 reveals an ejection fraction of 55%-60% with a mean gradient across the mitral valve of 11 mmHg, RV systolic pressure plus RA pressure, dilated IVC, no change from 2018.  ASD repair was \"okay.\"      Labs on 02/17/2020, potassium 4.4, GFR 56.        IMPRESSION:      Tremaine is a 49-year-old gentleman with 2 active issues:     1.  Mitral valve repair.     He had an atrial septal defect and mitral valve repair at age 3 with mitral annuloplasty ring placed in 2005 for mitral insufficiency.     He continues to be exertionally unlimited, and even when performing a high level of exertion on his bike, he has not noticed any new exertionally-limiting symptoms.     We " have followed his elevated mitral gradient for years, but his most recent echo on 01/08/2020 is unchanged since 2018.     Obviously, we will continue to monitor hemodynamics noninvasively as well as clinical symptoms.  We did discuss repeating another echocardiogram next January.  However, with the COVID pandemic, we will evaluate the risks and benefits of this strategy on an kervin basis per his request.     2.  Hypertension.       Blood pressures have been well controlled in the 120s/70s.  He did not tolerate thiazide diuretics due to hypokalemia, but spironolactone has worked well for him.  We will continue to monitor.        PLAN:     1.  Continue present medications at present doses.     2.  Virtual video followup visit in 6 months to discuss clinical progress and the timing of the next echocardiogram or earlier if needed.     Obviously, we will continue to monitor his mitral valve hemodynamics and if he should have new symptoms secondary to his valvular pathology, this would lead us to a more aggressive approach at that time.      Once again, it was a pleasure participating in the care of your patient, Mr. Tremaine King.  Please feel free to contact me anytime if any questions regarding his care in the future.           Sincerely,             Tim Gotti MD            Addendum 7/1/20:     Lab work results reviewed from today.     Sodium low 126, patient asx     Plan:     1.  Discontinue spironolactone     2.  Increase Toprol from 100 to 150mg po qam     3.  Recheck BMP one month     4.  Virtual video visit 6mo       Please do not hesitate to contact me if you have any questions/concerns.     Sincerely,     Tim Gotti MD

## 2020-07-01 NOTE — PROGRESS NOTES
Service Date: 2020      VIRTUAL VIDEO VISIT      Samy Marcelino MD    [facility]   [address]   [place] [address]       RE:    Tremaine King   MRN:  [id number]   :        Dear Dr. Marcelino:        It was a pleasure participating in the care of your patient, Mr. Tremaine King.  As you know, he is a 49-year-old gentleman whom I saw today over virtual video visit for mitral valve repair and hypertension.      His past medical history is significant for the followin.  Migraine headaches.   2.  Gout.   3.  Seasonal allergies with verbal history of mild asthma.   4.  Meniscus tear of the knee.   5.  History of morbid obesity with significant weight loss due to diet and exercise in the past.   6.  Sleep apnea, treated with CPAP since .      His cardiac history is significant for having had an ASD repair along with mitral valve repair at age 3.  Subsequent mitral insufficiency required a mitral valve repair with annuloplasty ring in  by Dr. Hernandez.  His postop course was complicated by high resting pulse and Dr. Diehl performed an EP study, which did not induce significant arrhythmias and he was diagnosed as having inappropriate sinus tachycardia which was medically managed.      I last saw him 2020, and at that time he was doing adequately.  He presents today for continuing care.      Since our last visit, he has done adequately.  He continues to spend a great deal of time taking care of his elderly parents, in particular, his father who is paraplegic and had an amputation recently.  He has not been biking as much as he would like to, but when he does get out, he does not notice any new exertional symptoms.  He walks every other day for about an hour and feels good and usually has to stop secondary to a herniated disk in his back and generalized back pain.      He denies any new chest pain, shortness of breath, PND, orthopnea, edema, palpitations, syncope or  "near-syncope.      REVIEW OF SYSTEMS:  Positive for his back pain.      He notes that his baseline blood pressures, now that he has started spironolactone, have been running in the 120s/70s at home.      CURRENT MEDICATIONS:     1.  Amlodipine 10 mg a day.   2.  Aspirin 81 mg a day.   3.  Lisinopril 40 mg a day.   4.  Metoprolol succinate 100 mg a day.   5.  Spironolactone 12.5 mg a day.      PHYSICAL EXAMINATION:     VITAL SIGNS:  Blood pressures at home generally run in the 120s/70s.   PSYCHIATRIC:  She is alert and oriented.   GENERAL:  He is comfortable, well groomed.   EYES:  Do not reveal gross erythema or exudate.   RESPIRATORY:  He is breathing comfortably without gross cough.      The remainder of the comprehensive physical exam was deferred secondary to the COVID-19 pandemic and secondary to video visit restrictions.      His echocardiogram as of 01/08/2020 reveals an ejection fraction of 55%-60% with a mean gradient across the mitral valve of 11 mmHg, RV systolic pressure plus RA pressure, dilated IVC, no change from 2018.  ASD repair was \"okay.\"      Labs on 02/17/2020, potassium 4.4, GFR 56.        IMPRESSION:      Tremaine is a 49-year-old gentleman with 2 active issues:     1.  Mitral valve repair.     He had an atrial septal defect and mitral valve repair at age 3 with mitral annuloplasty ring placed in 2005 for mitral insufficiency.     He continues to be exertionally unlimited, and even when performing a high level of exertion on his bike, he has not noticed any new exertionally-limiting symptoms.     We have followed his elevated mitral gradient for years, but his most recent echo on 01/08/2020 is unchanged since 2018.     Obviously, we will continue to monitor hemodynamics noninvasively as well as clinical symptoms.  We did discuss repeating another echocardiogram next January.  However, with the COVID pandemic, we will evaluate the risks and benefits of this strategy on an kervin basis per his request. "     2.  Hypertension.       Blood pressures have been well controlled in the 120s/70s.  He did not tolerate thiazide diuretics due to hypokalemia, but spironolactone has worked well for him.  We will continue to monitor.        PLAN:     1.  Continue present medications at present doses.     2.  Virtual video followup visit in 6 months to discuss clinical progress and the timing of the next echocardiogram or earlier if needed.     Obviously, we will continue to monitor his mitral valve hemodynamics and if he should have new symptoms secondary to his valvular pathology, this would lead us to a more aggressive approach at that time.      Once again, it was a pleasure participating in the care of your patient, Mr. Tremaine Horn.  Please feel free to contact me anytime if any questions regarding his care in the future.           Sincerely,             Tim Gotti MD            Addendum 20:     Lab work results reviewed from today.     Sodium low 126, patient asx     Plan:     1.  Discontinue spironolactone     2.  Increase Toprol from 100 to 150mg po qam     3.  Recheck BMP one month     4.  Virtual video visit 6mo                D: 2020   T: 2020   MT: [email or web address]      Name:     TREMAINE HORN   MRN:      [id number]        Account:      [id number]   :      1970           Service Date: 2020      Document: [id number]

## 2020-07-01 NOTE — PATIENT INSTRUCTIONS
Patient Instructions:  It was a pleasure to see you in the cardiology clinic today.      If you have any questions, you can reach my nurse, Teresa NULL LPN, at [phone].  Press Option #1 for the [facility], and then press Option #4 for nursing.    We are encouraging the use of [employer] to communicate with your HealthCare Provider    Medication Changes:   - Discontinue spironolactone.    - Increase Toprol to 150 mg every day.     Recommendations: None.    Studies Ordered: BMP in one month (non-fasting lab).    The results from today include: None.    Please follow up: With Dr. Gotti in 6 months.  Due to COVID-19, this may be a virtual visit.    Sincerely,    Tim Gotti MD     If you have an urgent need after hours (8:00 am to 4:30 pm) please call [phone] and ask for the cardiology fellow on call.

## 2020-07-07 NOTE — RESULT ENCOUNTER NOTE
Results discussed directly with patient during clinic visit. Any further details documented in the progress note.     Teresa Reynoso LPN

## 2020-07-08 NOTE — TELEPHONE ENCOUNTER
Called and spoke with Gloria with [facility] in regards to Metoprolol.  Informed her that 1.5 tabs was ok as Pt would just be cutting and not crushing tablets.  Gloria verbalized understanding and agreed to prescription.    Teresa Reynoso LPN

## 2020-08-16 ENCOUNTER — MYC REFILL (OUTPATIENT)
Dept: FAMILY MEDICINE | Facility: CLINIC | Age: 50
End: 2020-08-16

## 2020-08-16 DIAGNOSIS — J45.30 MILD PERSISTENT ASTHMA WITHOUT COMPLICATION: ICD-10-CM

## 2020-08-19 ENCOUNTER — MYC MEDICAL ADVICE (OUTPATIENT)
Dept: FAMILY MEDICINE | Facility: CLINIC | Age: 50
End: 2020-08-19

## 2020-08-19 RX ORDER — ALBUTEROL SULFATE 90 UG/1
2 AEROSOL, METERED RESPIRATORY (INHALATION) EVERY 6 HOURS PRN
Qty: 1 INHALER | Refills: 11 | Status: SHIPPED | OUTPATIENT
Start: 2020-08-19 | End: 2021-08-23

## 2020-08-19 NOTE — TELEPHONE ENCOUNTER
Routing refill request to provider for review/approval because:  Patient due for ACT and follow up  ACT sent through my chart  LOV: 10/9/19  Brianda Padron RN   [facility]

## 2020-08-20 NOTE — TELEPHONE ENCOUNTER
Pt completed ACT via Subtext.   Entered values in chart.   Inhaler w/refills sent previously.     Sofia Prater RN   [facility]

## 2020-08-21 ASSESSMENT — ASTHMA QUESTIONNAIRES: ACT_TOTALSCORE: 23

## 2020-10-03 DIAGNOSIS — I10 ESSENTIAL HYPERTENSION WITH GOAL BLOOD PRESSURE LESS THAN 140/90: ICD-10-CM

## 2020-10-05 NOTE — TELEPHONE ENCOUNTER
Routing refill request to provider for review/approval because:  Labs out of range:  CR    Jessica Ngo RN   [facility]

## 2020-10-06 RX ORDER — AMLODIPINE BESYLATE 10 MG/1
10 TABLET ORAL DAILY
Qty: 90 TABLET | Refills: 3 | Status: SHIPPED | OUTPATIENT
Start: 2020-10-06 | End: 2021-10-06

## 2020-10-09 DIAGNOSIS — I10 ESSENTIAL HYPERTENSION WITH GOAL BLOOD PRESSURE LESS THAN 140/90: ICD-10-CM

## 2020-10-09 RX ORDER — LISINOPRIL 40 MG/1
TABLET ORAL
Qty: 90 TABLET | Refills: 0 | Status: SHIPPED | OUTPATIENT
Start: 2020-10-09 | End: 2021-01-10

## 2020-10-09 NOTE — TELEPHONE ENCOUNTER
Routing refill request to provider for review/approval because:  Labs out of range:  CR    Jessica Ngo RN   [facility]

## 2020-10-29 ENCOUNTER — TELEPHONE (OUTPATIENT)
Dept: FAMILY MEDICINE | Facility: CLINIC | Age: 50
End: 2020-10-29

## 2020-10-29 DIAGNOSIS — M10.00 IDIOPATHIC GOUT, UNSPECIFIED CHRONICITY, UNSPECIFIED SITE: Primary | ICD-10-CM

## 2020-10-29 DIAGNOSIS — I10 ESSENTIAL HYPERTENSION WITH GOAL BLOOD PRESSURE LESS THAN 140/90: ICD-10-CM

## 2020-10-29 NOTE — TELEPHONE ENCOUNTER
Dr Marcelino    The appointment is on 11/9/2020 for in person for annual  Do you want a virtual still and what labs are needed    Cued a few up are there others you need?    Jessica Ngo RN   [facility]

## 2020-10-29 NOTE — TELEPHONE ENCOUNTER
----- Message from Samy Marcelino MD sent at 10/28/2020  8:07 PM CDT -----  Please contact patient: needs to change video appointment Fri from a physical to medication update/refill, then schedule a fasting lab only appointment. OR reschedule for inperson physical. Thanks Samy

## 2020-11-06 NOTE — PATIENT INSTRUCTIONS
Pharmacy Development  [facility]    Preventive Health Recommendations  Male Ages 50 - 64    Yearly exam:             See your health care provider every year in order to  o   Review health changes.   o   Discuss preventive care.    o   Review your medicines if your doctor has prescribed any.     Have a cholesterol test every 5 years, or more frequently if you are at risk for high cholesterol/heart disease.     Have a diabetes test (fasting glucose) every three years. If you are at risk for diabetes, you should have this test more often.     Have a colonoscopy at age 50, or have a yearly FIT test (stool test). These exams will check for colon cancer.      Talk with your health care provider about whether or not a prostate cancer screening test (PSA) is right for you.    You should be tested each year for STDs (sexually transmitted diseases), if you re at risk.     Shots: Get a flu shot each year. Get a tetanus shot every 10 years.     Nutrition:    Eat at least 5 servings of fruits and vegetables daily.     Eat whole-grain bread, whole-wheat pasta and brown rice instead of white grains and rice.     Get adequate Calcium and Vitamin D.     Lifestyle    Exercise for at least 150 minutes a week (30 minutes a day, 5 days a week). This will help you control your weight and prevent disease.     Limit alcohol to one drink per day.     No smoking.     Wear sunscreen to prevent skin cancer.     See your dentist every six months for an exam and cleaning.     See your eye doctor every 1 to 2 years.

## 2020-11-06 NOTE — PROGRESS NOTES
SUBJECTIVE:   CC: Tremaine King is an 50 year old male who presents for preventive health visit.     Patient has been advised of split billing requirements and indicates understanding:  Healthy Habits:    Do you get at least three servings of calcium containing foods daily (dairy, green leafy vegetables, etc.)? yes    Amount of exercise or daily activities, outside of work: No    Problems taking medications regularly No    Medication side effects: No    Have you had an eye exam in the past two years? yes    Do you see a dentist twice per year? no    Do you have sleep apnea, excessive snoring or daytime drowsiness?yes- sleep apnea and has a machine for it .    Erectile Dysfunction-notes that on occasion he is unable to sustain an erection during intercourse.  He has not tried ED medications in the past but is open to trying.    Hypertension Follow-up      Do you check your blood pressure regularly outside of the clinic? No     Are you following a low salt diet? Yes    Are your blood pressures ever more than 140 on the top number (systolic) OR more   than 90 on the bottom number (diastolic), for example 140/90?     Asthma Follow-Up    Was ACT completed today?  No      Do you have a cough?  No    Are you experiencing any wheezing in your chest?  No    Do you have any shortness of breath?  No     How often are you using a short-acting (rescue) inhaler or nebulizer, such as Albuterol?  A few times a week    How many days per week do you miss taking your asthma controller medication?  0    Please describe any recent triggers for your asthma: upper respiratory infections, dust mites and pollens    Have you had any Emergency Room Visits, Urgent Care Visits, or Hospital Admissions since your last office visit?  No      Today's PHQ-2 Score:   PHQ-2 ( 1999 [employer]izer) 1/29/2020 10/9/2019   Q1: Little interest or pleasure in doing things 0 0   Q2: Feeling down, depressed or hopeless 0 0   PHQ-2 Score 0 0   Q1: Little interest or  pleasure in doing things - -   Q2: Feeling down, depressed or hopeless - -   PHQ-2 Score - -     Abuse: Current or Past(Physical, Sexual or Emotional)- No  Do you feel safe in your environment? Yes        Social History     Tobacco Use     Smoking status: Former Smoker     Types: Cigars     Smokeless tobacco: Never Used     Tobacco comment: quit smoking cigarettes and cigars at ~ age 22 (was light smoker for about 4 years))   Substance Use Topics     Alcohol use: Yes     Comment: 1-2/day on weekdays, 2-3/day on wknds     If you drink alcohol do you typically have >3 drinks per day or >7 drinks per week? 2 drinks per day                       Last PSA:   PSA   Date Value Ref Range Status   10/03/2018 1.95 0 - 4 ug/L Final     Comment:     Assay Method:  Chemiluminescence using Siemens Vista analyzer       Reviewed orders with patient. Reviewed health maintenance and updated orders accordingly - Yes  Lab work is in process  Labs reviewed in EPIC    Reviewed and updated as needed this visit by clinical staff  Tobacco  Allergies  Meds  Problems  Med Hx  Surg Hx  Fam Hx          Reviewed and updated as needed this visit by Provider                    ROS:  CONSTITUTIONAL: NEGATIVE for fever, chills, change in weight  INTEGUMENTARY/SKIN: NEGATIVE for worrisome rashes, moles or lesions  EYES: NEGATIVE for vision changes or irritation  ENT: NEGATIVE for ear, mouth and throat problems  RESP: NEGATIVE for significant cough or SOB  CV: NEGATIVE for chest pain, palpitations or peripheral edema  GI: NEGATIVE for nausea, abdominal pain, heartburn, or change in bowel habits   male: negative for dysuria, hematuria, decreased urinary stream, erectile dysfunction, urethral discharge  MUSCULOSKELETAL: NEGATIVE for significant arthralgias or myalgia  NEURO: NEGATIVE for weakness, dizziness or paresthesias  PSYCHIATRIC: NEGATIVE for changes in mood or affect    OBJECTIVE:   BP (!) 148/84   Pulse 72   Temp 98.8  F (37.1  C)  "(Temporal)   Resp 18   Ht 1.803 m (5' 10.98\")   Wt 117.5 kg (259 lb)   SpO2 97%   BMI 36.14 kg/m    EXAM:  GENERAL: healthy, alert and no distress  EYES: Eyes grossly normal to inspection, PERRL and conjunctivae and sclerae normal  HENT: ear canals and TM's normal, nose and mouth without ulcers or lesions  NECK: no adenopathy, no asymmetry, masses, or scars and thyroid normal to palpation  RESP: lungs clear to auscultation - no rales, rhonchi or wheezes  CV: regular rate and rhythm, normal S1 S2, no S3 or S4, no murmur, click or rub, no peripheral edema and peripheral pulses strong  ABDOMEN: soft, nontender, no hepatosplenomegaly, no masses and bowel sounds normal   (male): normal male genitalia without lesions or urethral discharge, no hernia  RECTAL: normal sphincter tone, no rectal masses, prostate normal size, smooth, nontender without nodules or masses  MS: no gross musculoskeletal defects noted, no edema  SKIN: no suspicious lesions or rashes  NEURO: Normal strength and tone, mentation intact and speech normal  PSYCH: mentation appears normal, affect normal/bright    Diagnostic Test Results:  Labs reviewed in Epic    ASSESSMENT/PLAN:   1. Routine general medical examination at a health care facility  Doing well   - PSA, screen    2. Essential hypertension with goal blood pressure less than 140/90  At goal   - Comprehensive metabolic panel  - Lipid panel reflex to direct LDL Fasting    3. Mild persistent asthma without complication  At goal   - beclomethasone HFA (QVAR REDIHALER) 80 MCG/ACT inhaler; Inhale 1 puff into the lungs 2 times daily  Dispense: 10.6 g; Refill: 11    4. Erectile dysfunction due to arterial insufficiency  New, likely hypertension related  - sildenafil (REVATIO) 20 MG tablet; Take 1 tablet (20 mg) by mouth daily as needed (erectile dysfunction)  Dispense: 12 tablet; Refill: 1    5. Idiopathic gout, unspecified chronicity, unspecified site  quiescent  - Uric acid  - CBC with " "platelets    Patient has been advised of split billing requirements and indicates understanding: Yes  COUNSELING:  Reviewed preventive health counseling, as reflected in patient instructions       Regular exercise       Healthy diet/nutrition       Vision screening       Colon cancer screening    Estimated body mass index is 36.14 kg/m  as calculated from the following:    Height as of this encounter: 1.803 m (5' 10.98\").    Weight as of this encounter: 117.5 kg (259 lb).    Weight management plan: Discussed healthy diet and exercise guidelines    He reports that he has quit smoking. His smoking use included cigars. He has never used smokeless tobacco.    [email or web address]  [employer]    Samy Marcelino MD  [facility]  "

## 2020-11-08 ENCOUNTER — HEALTH MAINTENANCE LETTER (OUTPATIENT)
Age: 50
End: 2020-11-08

## 2020-11-09 ENCOUNTER — OFFICE VISIT (OUTPATIENT)
Dept: FAMILY MEDICINE | Facility: CLINIC | Age: 50
End: 2020-11-09
Payer: COMMERCIAL

## 2020-11-09 VITALS
HEART RATE: 72 BPM | SYSTOLIC BLOOD PRESSURE: 148 MMHG | TEMPERATURE: 98.8 F | DIASTOLIC BLOOD PRESSURE: 84 MMHG | RESPIRATION RATE: 18 BRPM | OXYGEN SATURATION: 97 % | HEIGHT: 71 IN | BODY MASS INDEX: 36.26 KG/M2 | WEIGHT: 259 LBS

## 2020-11-09 DIAGNOSIS — N52.01 ERECTILE DYSFUNCTION DUE TO ARTERIAL INSUFFICIENCY: ICD-10-CM

## 2020-11-09 DIAGNOSIS — Z00.00 ROUTINE GENERAL MEDICAL EXAMINATION AT A HEALTH CARE FACILITY: Primary | ICD-10-CM

## 2020-11-09 DIAGNOSIS — M10.00 IDIOPATHIC GOUT, UNSPECIFIED CHRONICITY, UNSPECIFIED SITE: ICD-10-CM

## 2020-11-09 DIAGNOSIS — J45.30 MILD PERSISTENT ASTHMA WITHOUT COMPLICATION: ICD-10-CM

## 2020-11-09 DIAGNOSIS — I10 ESSENTIAL HYPERTENSION WITH GOAL BLOOD PRESSURE LESS THAN 140/90: ICD-10-CM

## 2020-11-09 LAB
ALBUMIN SERPL-MCNC: 4 G/DL (ref 3.4–5)
ALP SERPL-CCNC: 102 U/L (ref 40–150)
ALT SERPL W P-5'-P-CCNC: 166 U/L (ref 0–70)
ANION GAP SERPL CALCULATED.3IONS-SCNC: 11 MMOL/L (ref 3–14)
AST SERPL W P-5'-P-CCNC: 147 U/L (ref 0–45)
BILIRUB SERPL-MCNC: 1.2 MG/DL (ref 0.2–1.3)
BUN SERPL-MCNC: 23 MG/DL (ref 7–30)
CALCIUM SERPL-MCNC: 9.6 MG/DL (ref 8.5–10.1)
CHLORIDE SERPL-SCNC: 101 MMOL/L (ref 94–109)
CHOLEST SERPL-MCNC: 201 MG/DL
CO2 SERPL-SCNC: 21 MMOL/L (ref 20–32)
CREAT SERPL-MCNC: 1.15 MG/DL (ref 0.66–1.25)
ERYTHROCYTE [DISTWIDTH] IN BLOOD BY AUTOMATED COUNT: 12.1 % (ref 10–15)
GFR SERPL CREATININE-BSD FRML MDRD: 73 ML/MIN/{1.73_M2}
GLUCOSE SERPL-MCNC: 124 MG/DL (ref 70–99)
HCT VFR BLD AUTO: 41.6 % (ref 40–53)
HDLC SERPL-MCNC: 51 MG/DL
HGB BLD-MCNC: 14.8 G/DL (ref 13.3–17.7)
LDLC SERPL CALC-MCNC: 114 MG/DL
MCH RBC QN AUTO: 32.4 PG (ref 26.5–33)
MCHC RBC AUTO-ENTMCNC: 35.6 G/DL (ref 31.5–36.5)
MCV RBC AUTO: 91 FL (ref 78–100)
NONHDLC SERPL-MCNC: 150 MG/DL
PLATELET # BLD AUTO: 183 10E9/L (ref 150–450)
POTASSIUM SERPL-SCNC: 4.3 MMOL/L (ref 3.4–5.3)
PROT SERPL-MCNC: 8.1 G/DL (ref 6.8–8.8)
PSA SERPL-ACNC: 2.35 UG/L (ref 0–4)
RBC # BLD AUTO: 4.57 10E12/L (ref 4.4–5.9)
SODIUM SERPL-SCNC: 133 MMOL/L (ref 133–144)
TRIGL SERPL-MCNC: 180 MG/DL
URATE SERPL-MCNC: 5.6 MG/DL (ref 3.5–7.2)
WBC # BLD AUTO: 8.5 10E9/L (ref 4–11)

## 2020-11-09 PROCEDURE — 80061 LIPID PANEL: CPT | Performed by: FAMILY MEDICINE

## 2020-11-09 PROCEDURE — 36415 COLL VENOUS BLD VENIPUNCTURE: CPT | Performed by: FAMILY MEDICINE

## 2020-11-09 PROCEDURE — G0103 PSA SCREENING: HCPCS | Performed by: FAMILY MEDICINE

## 2020-11-09 PROCEDURE — 85027 COMPLETE CBC AUTOMATED: CPT | Performed by: FAMILY MEDICINE

## 2020-11-09 PROCEDURE — 84550 ASSAY OF BLOOD/URIC ACID: CPT | Performed by: FAMILY MEDICINE

## 2020-11-09 PROCEDURE — 80053 COMPREHEN METABOLIC PANEL: CPT | Performed by: FAMILY MEDICINE

## 2020-11-09 PROCEDURE — 99396 PREV VISIT EST AGE 40-64: CPT | Performed by: FAMILY MEDICINE

## 2020-11-09 RX ORDER — SILDENAFIL CITRATE 20 MG/1
20 TABLET ORAL DAILY PRN
Qty: 12 TABLET | Refills: 1 | Status: SHIPPED | OUTPATIENT
Start: 2020-11-09

## 2020-11-09 ASSESSMENT — MIFFLIN-ST. JEOR: SCORE: 2046.69

## 2020-11-13 DIAGNOSIS — R74.01 NONSPECIFIC ELEVATION OF LEVELS OF TRANSAMINASE OR LACTIC ACID DEHYDROGENASE (LDH): Primary | ICD-10-CM

## 2020-11-13 DIAGNOSIS — R74.02 NONSPECIFIC ELEVATION OF LEVELS OF TRANSAMINASE OR LACTIC ACID DEHYDROGENASE (LDH): Primary | ICD-10-CM

## 2020-11-13 NOTE — RESULT ENCOUNTER NOTE
Shree Penaloza: Your recent results indicate pre-diabetes and liver inflammation (most commonly excess carbs or alcohol stored as fat in liver). Recommend to cut simple carbs (bread rice pasta and potatoes) and abstain from alcohol x 6 weeks, then call for fasting lab only appointment to repeat liver blood tests. Depending on findings, may need additional testing. Contact if questions.    Samy

## 2021-03-30 ENCOUNTER — IMMUNIZATION (OUTPATIENT)
Dept: NURSING | Facility: CLINIC | Age: 51
End: 2021-03-30
Payer: COMMERCIAL

## 2021-03-30 PROCEDURE — 91300 PR COVID VAC PFIZER DIL RECON 30 MCG/0.3 ML IM: CPT

## 2021-03-30 PROCEDURE — 0001A PR COVID VAC PFIZER DIL RECON 30 MCG/0.3 ML IM: CPT

## 2021-04-20 ENCOUNTER — IMMUNIZATION (OUTPATIENT)
Dept: NURSING | Facility: CLINIC | Age: 51
End: 2021-04-20
Attending: INTERNAL MEDICINE
Payer: COMMERCIAL

## 2021-04-20 PROCEDURE — 0002A PR COVID VAC PFIZER DIL RECON 30 MCG/0.3 ML IM: CPT

## 2021-04-20 PROCEDURE — 91300 PR COVID VAC PFIZER DIL RECON 30 MCG/0.3 ML IM: CPT

## 2021-08-19 DIAGNOSIS — J45.30 MILD PERSISTENT ASTHMA WITHOUT COMPLICATION: ICD-10-CM

## 2021-08-23 ENCOUNTER — MYC MEDICAL ADVICE (OUTPATIENT)
Dept: FAMILY MEDICINE | Facility: CLINIC | Age: 51
End: 2021-08-23

## 2021-08-23 RX ORDER — ALBUTEROL SULFATE 90 UG/1
2 AEROSOL, METERED RESPIRATORY (INHALATION) EVERY 6 HOURS PRN
Qty: 8.5 G | Refills: 0 | Status: SHIPPED | OUTPATIENT
Start: 2021-08-23 | End: 2021-11-01

## 2021-08-23 NOTE — TELEPHONE ENCOUNTER
"Patient over due for visit with PCP. Parkya message sent to patient and lana refill given on following med.     Requested Prescriptions   Signed Prescriptions Disp Refills    albuterol (PROAIR HFA/PROVENTIL HFA/VENTOLIN HFA) 108 (90 Base) MCG/ACT inhaler 8.5 g 0     Sig: Inhale 2 puffs into the lungs every 6 hours as needed for shortness of breath / dyspnea       Asthma Maintenance Inhalers - Anticholinergics Failed - 8/19/2021 12:05 PM        Failed - Asthma control assessment score within normal limits in last 6 months     Please review ACT score.           Failed - Recent (6 mo) or future (30 days) visit within the authorizing provider's specialty     Patient had office visit in the last 6 months or has a visit in the next 30 days with authorizing provider or within the authorizing provider's specialty.  See \"Patient Info\" tab in inbasket, or \"Choose Columns\" in Meds & Orders section of the refill encounter.            Passed - Patient is age 12 years or older        Passed - Medication is active on med list       Short-Acting Beta Agonist Inhalers Protocol  Failed - 8/19/2021 12:05 PM        Failed - Asthma control assessment score within normal limits in last 6 months     Please review ACT score.           Failed - Recent (6 mo) or future (30 days) visit within the authorizing provider's specialty     Patient had office visit in the last 6 months or has a visit in the next 30 days with authorizing provider or within the authorizing provider's specialty.  See \"Patient Info\" tab in inbasket, or \"Choose Columns\" in Meds & Orders section of the refill encounter.            Passed - Patient is age 12 or older        Passed - Medication is active on med list           Mimi Senior RN  [facility]     "

## 2021-09-11 ENCOUNTER — HEALTH MAINTENANCE LETTER (OUTPATIENT)
Age: 51
End: 2021-09-11

## 2021-10-03 DIAGNOSIS — I10 ESSENTIAL HYPERTENSION WITH GOAL BLOOD PRESSURE LESS THAN 140/90: ICD-10-CM

## 2021-10-04 RX ORDER — METOPROLOL SUCCINATE 100 MG/1
TABLET, EXTENDED RELEASE ORAL
Qty: 135 TABLET | Refills: 0 | Status: SHIPPED | OUTPATIENT
Start: 2021-10-04 | End: 2021-10-06

## 2021-10-04 NOTE — TELEPHONE ENCOUNTER
LCV: 7/1/2020  [facility] ( RTC 6 m)  BP above protocol parameter- FYI to clinic  RF 90 day  Scheduling has been notified to contact the pt for appointment.    .  BP Readings from Last 3 Encounters:   11/09/20 (!) 148/84   01/29/20 (!) 151/84   01/08/20 (!) 157/95

## 2021-10-06 ENCOUNTER — OFFICE VISIT (OUTPATIENT)
Dept: FAMILY MEDICINE | Facility: CLINIC | Age: 51
End: 2021-10-06
Payer: COMMERCIAL

## 2021-10-06 ENCOUNTER — LAB (OUTPATIENT)
Dept: LAB | Facility: CLINIC | Age: 51
End: 2021-10-06
Payer: COMMERCIAL

## 2021-10-06 VITALS
SYSTOLIC BLOOD PRESSURE: 128 MMHG | BODY MASS INDEX: 35.84 KG/M2 | TEMPERATURE: 97.6 F | OXYGEN SATURATION: 98 % | DIASTOLIC BLOOD PRESSURE: 78 MMHG | HEART RATE: 74 BPM | WEIGHT: 256 LBS | HEIGHT: 71 IN

## 2021-10-06 DIAGNOSIS — Z13.6 CARDIOVASCULAR SCREENING; LDL GOAL LESS THAN 130: ICD-10-CM

## 2021-10-06 DIAGNOSIS — I10 ESSENTIAL HYPERTENSION WITH GOAL BLOOD PRESSURE LESS THAN 140/90: ICD-10-CM

## 2021-10-06 DIAGNOSIS — E66.01 MORBID OBESITY (H): ICD-10-CM

## 2021-10-06 DIAGNOSIS — M10.00 IDIOPATHIC GOUT, UNSPECIFIED CHRONICITY, UNSPECIFIED SITE: ICD-10-CM

## 2021-10-06 DIAGNOSIS — Z00.00 ROUTINE GENERAL MEDICAL EXAMINATION AT A HEALTH CARE FACILITY: Primary | ICD-10-CM

## 2021-10-06 DIAGNOSIS — J45.30 MILD PERSISTENT ASTHMA WITHOUT COMPLICATION: ICD-10-CM

## 2021-10-06 DIAGNOSIS — Z12.11 COLON CANCER SCREENING: Primary | ICD-10-CM

## 2021-10-06 DIAGNOSIS — Z12.11 SPECIAL SCREENING FOR MALIGNANT NEOPLASMS, COLON: ICD-10-CM

## 2021-10-06 DIAGNOSIS — I48.91 ATRIAL FIBRILLATION, UNSPECIFIED TYPE (H): ICD-10-CM

## 2021-10-06 DIAGNOSIS — F10.10 NONDEPENDENT ALCOHOL ABUSE, EPISODIC DRINKING BEHAVIOR: ICD-10-CM

## 2021-10-06 DIAGNOSIS — R89.9 ABNORMAL LABORATORY TEST: ICD-10-CM

## 2021-10-06 LAB
ALBUMIN SERPL-MCNC: 3.7 G/DL (ref 3.4–5)
ALBUMIN UR-MCNC: 100 MG/DL
ALP SERPL-CCNC: 115 U/L (ref 40–150)
ALT SERPL W P-5'-P-CCNC: 41 U/L (ref 0–70)
ANION GAP SERPL CALCULATED.3IONS-SCNC: 6 MMOL/L (ref 3–14)
APPEARANCE UR: CLEAR
AST SERPL W P-5'-P-CCNC: 47 U/L (ref 0–45)
BACTERIA #/AREA URNS HPF: ABNORMAL /HPF
BILIRUB SERPL-MCNC: 1.6 MG/DL (ref 0.2–1.3)
BILIRUB UR QL STRIP: NEGATIVE
BUN SERPL-MCNC: 18 MG/DL (ref 7–30)
CALCIUM SERPL-MCNC: 9.3 MG/DL (ref 8.5–10.1)
CHLORIDE BLD-SCNC: 104 MMOL/L (ref 94–109)
CHOLEST SERPL-MCNC: 147 MG/DL
CO2 SERPL-SCNC: 25 MMOL/L (ref 20–32)
COLOR UR AUTO: YELLOW
CREAT SERPL-MCNC: 1.07 MG/DL (ref 0.66–1.25)
ERYTHROCYTE [DISTWIDTH] IN BLOOD BY AUTOMATED COUNT: 12.9 % (ref 10–15)
FASTING STATUS PATIENT QL REPORTED: YES
GFR SERPL CREATININE-BSD FRML MDRD: 79 ML/MIN/1.73M2
GLUCOSE BLD-MCNC: 119 MG/DL (ref 70–99)
GLUCOSE UR STRIP-MCNC: NEGATIVE MG/DL
HBA1C MFR BLD: 5.4 % (ref 0–5.6)
HCT VFR BLD AUTO: 36.8 % (ref 40–53)
HDLC SERPL-MCNC: 44 MG/DL
HGB BLD-MCNC: 12.1 G/DL (ref 13.3–17.7)
HGB UR QL STRIP: NEGATIVE
KETONES UR STRIP-MCNC: NEGATIVE MG/DL
LDLC SERPL CALC-MCNC: 91 MG/DL
LEUKOCYTE ESTERASE UR QL STRIP: NEGATIVE
MCH RBC QN AUTO: 31.8 PG (ref 26.5–33)
MCHC RBC AUTO-ENTMCNC: 32.9 G/DL (ref 31.5–36.5)
MCV RBC AUTO: 97 FL (ref 78–100)
NITRATE UR QL: NEGATIVE
NONHDLC SERPL-MCNC: 103 MG/DL
PH UR STRIP: 6 [PH] (ref 5–7)
PLATELET # BLD AUTO: 139 10E3/UL (ref 150–450)
POTASSIUM BLD-SCNC: 4.1 MMOL/L (ref 3.4–5.3)
PROT SERPL-MCNC: 8.3 G/DL (ref 6.8–8.8)
RBC # BLD AUTO: 3.81 10E6/UL (ref 4.4–5.9)
RBC #/AREA URNS AUTO: ABNORMAL /HPF
SODIUM SERPL-SCNC: 135 MMOL/L (ref 133–144)
SP GR UR STRIP: 1.02 (ref 1–1.03)
SQUAMOUS #/AREA URNS AUTO: ABNORMAL /LPF
TRIGL SERPL-MCNC: 58 MG/DL
UROBILINOGEN UR STRIP-ACNC: 1 E.U./DL
WBC # BLD AUTO: 7.8 10E3/UL (ref 4–11)
WBC #/AREA URNS AUTO: ABNORMAL /HPF

## 2021-10-06 PROCEDURE — 83036 HEMOGLOBIN GLYCOSYLATED A1C: CPT | Performed by: FAMILY MEDICINE

## 2021-10-06 PROCEDURE — 80053 COMPREHEN METABOLIC PANEL: CPT | Performed by: FAMILY MEDICINE

## 2021-10-06 PROCEDURE — 96127 BRIEF EMOTIONAL/BEHAV ASSMT: CPT | Performed by: FAMILY MEDICINE

## 2021-10-06 PROCEDURE — 80061 LIPID PANEL: CPT | Performed by: FAMILY MEDICINE

## 2021-10-06 PROCEDURE — 84550 ASSAY OF BLOOD/URIC ACID: CPT | Performed by: FAMILY MEDICINE

## 2021-10-06 PROCEDURE — 36415 COLL VENOUS BLD VENIPUNCTURE: CPT

## 2021-10-06 PROCEDURE — 99214 OFFICE O/P EST MOD 30 MIN: CPT | Mod: 25 | Performed by: FAMILY MEDICINE

## 2021-10-06 PROCEDURE — 85027 COMPLETE CBC AUTOMATED: CPT | Performed by: FAMILY MEDICINE

## 2021-10-06 PROCEDURE — 85045 AUTOMATED RETICULOCYTE COUNT: CPT

## 2021-10-06 PROCEDURE — 99396 PREV VISIT EST AGE 40-64: CPT | Performed by: FAMILY MEDICINE

## 2021-10-06 PROCEDURE — 81001 URINALYSIS AUTO W/SCOPE: CPT | Performed by: FAMILY MEDICINE

## 2021-10-06 RX ORDER — METOPROLOL SUCCINATE 100 MG/1
TABLET, EXTENDED RELEASE ORAL
Qty: 135 TABLET | Refills: 3 | Status: SHIPPED | OUTPATIENT
Start: 2021-10-06 | End: 2022-12-06

## 2021-10-06 RX ORDER — ALLOPURINOL 300 MG/1
1 TABLET ORAL DAILY
Qty: 90 TABLET | Refills: 3 | Status: SHIPPED | OUTPATIENT
Start: 2021-10-06 | End: 2022-12-09

## 2021-10-06 RX ORDER — AMLODIPINE BESYLATE 10 MG/1
10 TABLET ORAL DAILY
Qty: 90 TABLET | Refills: 3 | Status: ON HOLD | OUTPATIENT
Start: 2021-10-06 | End: 2021-11-12

## 2021-10-06 ASSESSMENT — PATIENT HEALTH QUESTIONNAIRE - PHQ9
SUM OF ALL RESPONSES TO PHQ QUESTIONS 1-9: 0
SUM OF ALL RESPONSES TO PHQ QUESTIONS 1-9: 0

## 2021-10-06 ASSESSMENT — MIFFLIN-ST. JEOR: SCORE: 2028.34

## 2021-10-06 NOTE — PATIENT INSTRUCTIONS
Get flu shot    Try support hose    Consider diuretic; check with cards next appointment     Continue alcohol abstinence, follow-up if you find it is not working    Preventive Health Recommendations  Male Ages 50 - 64    Yearly exam:             See your health care provider every year in order to  o   Review health changes.   o   Discuss preventive care.    o   Review your medicines if your doctor has prescribed any.     Have a cholesterol test every 5 years, or more frequently if you are at risk for high cholesterol/heart disease.     Have a diabetes test (fasting glucose) every three years. If you are at risk for diabetes, you should have this test more often.     Have a colonoscopy at age 50, or have a yearly FIT test (stool test). These exams will check for colon cancer.      Talk with your health care provider about whether or not a prostate cancer screening test (PSA) is right for you.    You should be tested each year for STDs (sexually transmitted diseases), if you re at risk.     Shots: Get a flu shot each year. Get a tetanus shot every 10 years.     Nutrition:    Eat at least 5 servings of fruits and vegetables daily.     Eat whole-grain bread, whole-wheat pasta and brown rice instead of white grains and rice.     Get adequate Calcium and Vitamin D.     Lifestyle    Exercise for at least 150 minutes a week (30 minutes a day, 5 days a week). This will help you control your weight and prevent disease.     Limit alcohol to one drink per day.     No smoking.     Wear sunscreen to prevent skin cancer.     See your dentist every six months for an exam and cleaning.     See your eye doctor every 1 to 2 years.

## 2021-10-06 NOTE — PROGRESS NOTES
SUBJECTIVE:   CC: Tremaine King is an 51 year old male who presents for preventative health visit.   Patient has been advised of split billing requirements and indicates understanding: Yes  Healthy Habits:     Getting at least 3 servings of Calcium per day:  Yes    Bi-annual eye exam:  Yes    Dental care twice a year:  NO    Sleep apnea or symptoms of sleep apnea:  Sleep apnea    Diet:  Regular (no restrictions)    Frequency of exercise:  None    Taking medications regularly:  No    Medication side effects:  None    PHQ-2 Total Score: 0    Additional concerns today:  No  Answers for HPI/ROS submitted by the patient on 10/6/2021  PHQ9 TOTAL SCORE: 0    Concern -drinking too much, history of elevated LFTs  Abstinent now, confident in the short run  -Persistent A. Fib  With successful rate control  -Gout secondary to diuretics  No recent attacks on allopurinol  -KOTA and obesity  Equipment helps, confident he can lose weight    Hypertension Follow-up      Do you check your blood pressure regularly outside of the clinic? Yes     Are you following a low salt diet? No    Are your blood pressures ever more than 140 on the top number (systolic) OR more   than 90 on the bottom number (diastolic), for example 140/90? Unknown     Asthma Follow-Up    Was ACT completed today?  No      Do you have a cough?  No    Are you experiencing any wheezing in your chest?  No    Do you have any shortness of breath?  No     How often are you using a short-acting (rescue) inhaler or nebulizer, such as Albuterol?  A few times a week    How many days per week do you miss taking your asthma controller medication?  0    Please describe any recent triggers for your asthma: upper respiratory infections, dust mites, pollens and animal dander    Have you had any Emergency Room Visits, Urgent Care Visits, or Hospital Admissions since your last office visit?  No      Today's PHQ-2 Score:   PHQ-2 ( 1999 Pfizer) 10/6/2021   Q1: Little interest or  pleasure in doing things 0   Q2: Feeling down, depressed or hopeless 0   PHQ-2 Score 0   Q1: Little interest or pleasure in doing things Not at all   Q2: Feeling down, depressed or hopeless Not at all   PHQ-2 Score 0       Abuse: Current or Past(Physical, Sexual or Emotional)- No  Do you feel safe in your environment? Yes    Have you ever done Advance Care Planning? (For example, a Health Directive, POLST, or a discussion with a medical provider or your loved ones about your wishes): Yes, advance care planning is on file.    Social History     Tobacco Use     Smoking status: Former Smoker     Types: Cigars     Smokeless tobacco: Never Used     Tobacco comment: quit smoking cigarettes and cigars at ~ age 22 (was light smoker for about 4 years))   Substance Use Topics     Alcohol use: Yes     Comment: 1-2/day on weekdays, 2-3/day on wknds     If you drink alcohol do you typically have >3 drinks per day or >7 drinks per week? No        Alcohol Use 10/6/2021   Prescreen: >3 drinks/day or >7 drinks/week? No   Prescreen: >3 drinks/day or >7 drinks/week? -   No flowsheet data found.    Last PSA:   PSA   Date Value Ref Range Status   11/09/2020 2.35 0 - 4 ug/L Final     Comment:     Assay Method:  Chemiluminescence using Siemens Vista analyzer       Reviewed orders with patient. Reviewed health maintenance and updated orders accordingly - Yes  Lab work is in process  Labs reviewed in EPIC    Reviewed and updated as needed this visit by clinical staff  Tobacco  Allergies  Meds              Reviewed and updated as needed this visit by Provider                Review of Systems  CONSTITUTIONAL: NEGATIVE for fever, chills, change in weight  INTEGUMENTARY/SKIN: NEGATIVE for worrisome rashes, moles or lesions  EYES: NEGATIVE for vision changes or irritation  ENT: NEGATIVE for ear, mouth and throat problems  RESP: NEGATIVE for significant cough or SOB  CV: NEGATIVE for chest pain, palpitations or peripheral edema  GI: NEGATIVE  "for nausea, abdominal pain, heartburn, or change in bowel habits   male: negative for dysuria, hematuria, decreased urinary stream, erectile dysfunction, urethral discharge  MUSCULOSKELETAL: NEGATIVE for significant arthralgias or myalgia  NEURO: NEGATIVE for weakness, dizziness or paresthesias  PSYCHIATRIC: NEGATIVE for changes in mood or affect    OBJECTIVE:   /78   Pulse 74   Temp 97.6  F (36.4  C) (Temporal)   Ht 1.803 m (5' 11\")   Wt 116.1 kg (256 lb)   SpO2 98%   BMI 35.70 kg/m      Physical Exam  GENERAL: healthy, alert and no distress  EYES: Eyes grossly normal to inspection, PERRL and conjunctivae and sclerae normal  HENT: ear canals and TM's normal, nose and mouth without ulcers or lesions  NECK: no adenopathy, no asymmetry, masses, or scars and thyroid normal to palpation  RESP: lungs clear to auscultation - no rales, rhonchi or wheezes  CV: regular rate and rhythm, normal S1 S2, no S3 or S4, no murmur, click or rub, no peripheral edema and peripheral pulses strong  ABDOMEN: soft, nontender, no hepatosplenomegaly, no masses and bowel sounds normal   (male): normal male genitalia without lesions or urethral discharge, no hernia  RECTAL: normal sphincter tone, no rectal masses, prostate normal size, smooth, nontender without nodules or masses  MS: no gross musculoskeletal defects noted, no edema  SKIN: no suspicious lesions or rashes  NEURO: Normal strength and tone, mentation intact and speech normal  PSYCH: mentation appears normal, affect normal/bright    Diagnostic Test Results:  Labs reviewed in Epic    ASSESSMENT/PLAN:       ICD-10-CM    1. Routine general medical examination at a health care facility  Z00.00    2. Mild persistent asthma without complication  J45.30    3. Essential hypertension with goal blood pressure less than 140/90  I10 Comprehensive metabolic panel (BMP + Alb, Alk Phos, ALT, AST, Total. Bili, TP)     metoprolol succinate ER (TOPROL-XL) 100 MG 24 hr tablet     " "Comprehensive metabolic panel (BMP + Alb, Alk Phos, ALT, AST, Total. Bili, TP)     DISCONTINUED: amLODIPine (NORVASC) 10 MG tablet   4. Atrial fibrillation, unspecified type (H)  I48.91    5. Idiopathic gout, unspecified chronicity, unspecified site  M10.00 allopurinol (ZYLOPRIM) 300 MG tablet     CBC with platelets     CBC with platelets     Uric acid     Reticulocyte count     Uric acid   6. Nondependent alcohol abuse, episodic drinking behavior  F10.10 OFFICE/OUTPT VISIT,EST,LEVL II   7. Morbid obesity (H)  E66.01    8. CARDIOVASCULAR SCREENING; LDL GOAL LESS THAN 130  Z13.6 Lipid panel reflex to direct LDL Fasting     Lipid panel reflex to direct LDL Fasting   9. Special screening for malignant neoplasms, colon  Z12.11        Patient has been advised of split billing requirements and indicates understanding: Yes  COUNSELING:   Reviewed preventive health counseling, as reflected in patient instructions       Consider AAA screening for ages 65-75 and smoking history       Regular exercise       Healthy diet/nutrition       Colon cancer screening       Prostate cancer screening    Estimated body mass index is 35.7 kg/m  as calculated from the following:    Height as of this encounter: 1.803 m (5' 11\").    Weight as of this encounter: 116.1 kg (256 lb).     Weight management plan: Discussed healthy diet and exercise guidelines    He reports that he has quit smoking. His smoking use included cigars. He has never used smokeless tobacco.    Get flu shot    Try support hose    Consider diuretic; check with cards next appointment     Continue alcohol abstinence, follow-up if you find it is not working    Samy Marcelino MD  [facility]  "

## 2021-10-07 LAB
RETICS # AUTO: 0.05 10E6/UL (ref 0.03–0.1)
RETICS/RBC NFR AUTO: 1.4 % (ref 0.5–2)
URATE SERPL-MCNC: 5.7 MG/DL (ref 3.5–7.2)

## 2021-10-07 RX ORDER — INFLUENZA A VIRUS A/VICTORIA/2454/2019 IVR-207 (H1N1) ANTIGEN (PROPIOLACTONE INACTIVATED), INFLUENZA A VIRUS A/HONG KONG/2671/2019 IVR-208 (H3N2) ANTIGEN (PROPIOLACTONE INACTIVATED), INFLUENZA B VIRUS B/VICTORIA/705/2018 BVR-11 ANTIGEN (PROPIOLACTONE INACTIVATED), INFLUENZA B VIRUS B/PHUKET/3073/2013 BVR-1B ANTIGEN (PROPIOLACTONE INACTIVATED) 15; 15; 15; 15 UG/.5ML; UG/.5ML; UG/.5ML; UG/.5ML
INJECTION, SUSPENSION INTRAMUSCULAR
COMMUNITY
Start: 2020-10-22 | End: 2022-12-15

## 2021-10-07 ASSESSMENT — PATIENT HEALTH QUESTIONNAIRE - PHQ9: SUM OF ALL RESPONSES TO PHQ QUESTIONS 1-9: 0

## 2021-10-08 NOTE — RESULT ENCOUNTER NOTE
Shree Penaloza: Also added a quantitative urinary albumin to check in 2 weeks along with complete blood count and chemistry panel (call [phone] to schedule).  Colonoscopy referral also made.  Contact if questions, will await results of these tests.  Samy

## 2021-10-30 DIAGNOSIS — J45.30 MILD PERSISTENT ASTHMA WITHOUT COMPLICATION: ICD-10-CM

## 2021-11-01 ENCOUNTER — MYC MEDICAL ADVICE (OUTPATIENT)
Dept: FAMILY MEDICINE | Facility: CLINIC | Age: 51
End: 2021-11-01

## 2021-11-02 RX ORDER — ALBUTEROL SULFATE 90 UG/1
2 AEROSOL, METERED RESPIRATORY (INHALATION) EVERY 6 HOURS PRN
Qty: 8.5 G | Refills: 3 | Status: SHIPPED | OUTPATIENT
Start: 2021-11-02 | End: 2022-02-02

## 2021-11-02 NOTE — TELEPHONE ENCOUNTER
"Requested Prescriptions   Signed Prescriptions Disp Refills    albuterol (PROAIR HFA/PROVENTIL HFA/VENTOLIN HFA) 108 (90 Base) MCG/ACT inhaler 8.5 g 3     Sig: Inhale 2 puffs into the lungs every 6 hours as needed for shortness of breath / dyspnea       Asthma Maintenance Inhalers - Anticholinergics Passed - 11/2/2021  1:31 PM        Passed - Patient is age 12 years or older        Passed - Asthma control assessment score within normal limits in last 6 months     Please review ACT score.           Passed - Medication is active on med list        Passed - Recent (6 mo) or future (30 days) visit within the authorizing provider's specialty     Patient had office visit in the last 6 months or has a visit in the next 30 days with authorizing provider or within the authorizing provider's specialty.  See \"Patient Info\" tab in inbasket, or \"Choose Columns\" in Meds & Orders section of the refill encounter.           Short-Acting Beta Agonist Inhalers Protocol  Passed - 11/2/2021  1:31 PM        Passed - Patient is age 12 or older        Passed - Asthma control assessment score within normal limits in last 6 months     Please review ACT score.           Passed - Medication is active on med list        Passed - Recent (6 mo) or future (30 days) visit within the authorizing provider's specialty     Patient had office visit in the last 6 months or has a visit in the next 30 days with authorizing provider or within the authorizing provider's specialty.  See \"Patient Info\" tab in inbasket, or \"Choose Columns\" in Meds & Orders section of the refill encounter.               Jane Lopez RN  [facility]    "

## 2021-11-03 ENCOUNTER — ANCILLARY PROCEDURE (OUTPATIENT)
Dept: CARDIOLOGY | Facility: CLINIC | Age: 51
End: 2021-11-03
Attending: INTERNAL MEDICINE
Payer: COMMERCIAL

## 2021-11-03 DIAGNOSIS — Q21.10 ASD (ATRIAL SEPTAL DEFECT): ICD-10-CM

## 2021-11-03 DIAGNOSIS — Z98.890 HISTORY OF REPAIR OF MITRAL VALVE: ICD-10-CM

## 2021-11-03 LAB — LVEF ECHO: NORMAL

## 2021-11-03 PROCEDURE — 93306 TTE W/DOPPLER COMPLETE: CPT | Performed by: INTERNAL MEDICINE

## 2021-11-03 ASSESSMENT — ASTHMA QUESTIONNAIRES: ACT_TOTALSCORE: 23

## 2021-11-10 ENCOUNTER — HOSPITAL ENCOUNTER (INPATIENT)
Facility: CLINIC | Age: 51
LOS: 2 days | Discharge: HOME | End: 2021-11-12
Attending: EMERGENCY MEDICINE | Admitting: INTERNAL MEDICINE
Payer: COMMERCIAL

## 2021-11-10 DIAGNOSIS — K92.2 GASTROINTESTINAL HEMORRHAGE, UNSPECIFIED GASTROINTESTINAL HEMORRHAGE TYPE: ICD-10-CM

## 2021-11-10 DIAGNOSIS — I48.91 ATRIAL FIBRILLATION, UNSPECIFIED TYPE (H): ICD-10-CM

## 2021-11-10 DIAGNOSIS — I10 ESSENTIAL HYPERTENSION WITH GOAL BLOOD PRESSURE LESS THAN 140/90: ICD-10-CM

## 2021-11-10 DIAGNOSIS — Q24.9 CONGENITAL HEART DISEASE: ICD-10-CM

## 2021-11-10 LAB
ABO/RH(D): NORMAL
ALBUMIN SERPL-MCNC: 3 G/DL (ref 3.4–5)
ALP SERPL-CCNC: 97 U/L (ref 40–150)
ALT SERPL W P-5'-P-CCNC: 40 U/L (ref 0–70)
ANION GAP SERPL CALCULATED.3IONS-SCNC: 9 MMOL/L (ref 3–14)
ANTIBODY SCREEN: NEGATIVE
AST SERPL W P-5'-P-CCNC: 39 U/L (ref 0–45)
ATRIAL RATE - MUSE: 92 BPM
BASOPHILS # BLD AUTO: 0.1 10E3/UL (ref 0–0.2)
BASOPHILS NFR BLD AUTO: 1 %
BILIRUB SERPL-MCNC: 1.2 MG/DL (ref 0.2–1.3)
BLD PROD TYP BPU: NORMAL
BLD PROD TYP BPU: NORMAL
BLOOD COMPONENT TYPE: NORMAL
BLOOD COMPONENT TYPE: NORMAL
BUN SERPL-MCNC: 57 MG/DL (ref 7–30)
CALCIUM SERPL-MCNC: 8.6 MG/DL (ref 8.5–10.1)
CHLORIDE BLD-SCNC: 108 MMOL/L (ref 94–109)
CO2 SERPL-SCNC: 24 MMOL/L (ref 20–32)
CODING SYSTEM: NORMAL
CODING SYSTEM: NORMAL
CREAT SERPL-MCNC: 1.06 MG/DL (ref 0.66–1.25)
CROSSMATCH: NORMAL
CROSSMATCH: NORMAL
DIASTOLIC BLOOD PRESSURE - MUSE: NORMAL MMHG
EOSINOPHIL # BLD AUTO: 0.1 10E3/UL (ref 0–0.7)
EOSINOPHIL NFR BLD AUTO: 1 %
ERYTHROCYTE [DISTWIDTH] IN BLOOD BY AUTOMATED COUNT: 13.2 % (ref 10–15)
FLUAV RNA SPEC QL NAA+PROBE: NEGATIVE
FLUBV RNA RESP QL NAA+PROBE: NEGATIVE
GFR SERPL CREATININE-BSD FRML MDRD: 80 ML/MIN/1.73M2
GLUCOSE BLD-MCNC: 129 MG/DL (ref 70–99)
HCT VFR BLD AUTO: 24.5 % (ref 40–53)
HGB BLD-MCNC: 6.6 G/DL (ref 13.3–17.7)
HGB BLD-MCNC: 6.8 G/DL (ref 13.3–17.7)
HGB BLD-MCNC: 8 G/DL (ref 13.3–17.7)
HOLD SPECIMEN: NORMAL
IMM GRANULOCYTES # BLD: 0.1 10E3/UL
IMM GRANULOCYTES NFR BLD: 1 %
INR PPP: 1.34 (ref 0.85–1.15)
INTERPRETATION ECG - MUSE: NORMAL
ISSUE DATE AND TIME: NORMAL
ISSUE DATE AND TIME: NORMAL
LYMPHOCYTES # BLD AUTO: 1.1 10E3/UL (ref 0.8–5.3)
LYMPHOCYTES NFR BLD AUTO: 11 %
MCH RBC QN AUTO: 31.3 PG (ref 26.5–33)
MCHC RBC AUTO-ENTMCNC: 32.7 G/DL (ref 31.5–36.5)
MCV RBC AUTO: 96 FL (ref 78–100)
MONOCYTES # BLD AUTO: 1.6 10E3/UL (ref 0–1.3)
MONOCYTES NFR BLD AUTO: 16 %
NEUTROPHILS # BLD AUTO: 7.3 10E3/UL (ref 1.6–8.3)
NEUTROPHILS NFR BLD AUTO: 70 %
NRBC # BLD AUTO: 0 10E3/UL
NRBC BLD AUTO-RTO: 0 /100
NT-PROBNP SERPL-MCNC: 1323 PG/ML (ref 0–900)
P AXIS - MUSE: NORMAL DEGREES
PLATELET # BLD AUTO: 202 10E3/UL (ref 150–450)
POTASSIUM BLD-SCNC: 3.8 MMOL/L (ref 3.4–5.3)
PR INTERVAL - MUSE: NORMAL MS
PROT SERPL-MCNC: 6.9 G/DL (ref 6.8–8.8)
QRS DURATION - MUSE: 112 MS
QT - MUSE: 388 MS
QTC - MUSE: 519 MS
R AXIS - MUSE: -50 DEGREES
RBC # BLD AUTO: 2.56 10E6/UL (ref 4.4–5.9)
SARS-COV-2 RNA RESP QL NAA+PROBE: NEGATIVE
SODIUM SERPL-SCNC: 141 MMOL/L (ref 133–144)
SPECIMEN EXPIRATION DATE: NORMAL
SYSTOLIC BLOOD PRESSURE - MUSE: NORMAL MMHG
T AXIS - MUSE: 125 DEGREES
TROPONIN I SERPL-MCNC: <0.015 UG/L (ref 0–0.04)
UNIT ABO/RH: NORMAL
UNIT ABO/RH: NORMAL
UNIT NUMBER: NORMAL
UNIT NUMBER: NORMAL
UNIT STATUS: NORMAL
UNIT STATUS: NORMAL
UNIT TYPE ISBT: 5100
UNIT TYPE ISBT: 5100
UPPER GI ENDOSCOPY: NORMAL
VENTRICULAR RATE- MUSE: 108 BPM
WBC # BLD AUTO: 10.3 10E3/UL (ref 4–11)

## 2021-11-10 PROCEDURE — 96374 THER/PROPH/DIAG INJ IV PUSH: CPT

## 2021-11-10 PROCEDURE — C9113 INJ PANTOPRAZOLE SODIUM, VIA: HCPCS | Performed by: EMERGENCY MEDICINE

## 2021-11-10 PROCEDURE — 80053 COMPREHEN METABOLIC PANEL: CPT | Performed by: EMERGENCY MEDICINE

## 2021-11-10 PROCEDURE — 99223 1ST HOSP IP/OBS HIGH 75: CPT | Mod: AI | Performed by: INTERNAL MEDICINE

## 2021-11-10 PROCEDURE — G0500 MOD SEDAT ENDO SERVICE >5YRS: HCPCS | Performed by: INTERNAL MEDICINE

## 2021-11-10 PROCEDURE — 83880 ASSAY OF NATRIURETIC PEPTIDE: CPT | Performed by: INTERNAL MEDICINE

## 2021-11-10 PROCEDURE — 96361 HYDRATE IV INFUSION ADD-ON: CPT

## 2021-11-10 PROCEDURE — C9803 HOPD COVID-19 SPEC COLLECT: HCPCS

## 2021-11-10 PROCEDURE — 120N000001 HC R&B MED SURG/OB

## 2021-11-10 PROCEDURE — C9113 INJ PANTOPRAZOLE SODIUM, VIA: HCPCS | Performed by: PHYSICIAN ASSISTANT

## 2021-11-10 PROCEDURE — 86923 COMPATIBILITY TEST ELECTRIC: CPT | Performed by: INTERNAL MEDICINE

## 2021-11-10 PROCEDURE — 99222 1ST HOSP IP/OBS MODERATE 55: CPT | Performed by: INTERNAL MEDICINE

## 2021-11-10 PROCEDURE — 99285 EMERGENCY DEPT VISIT HI MDM: CPT | Mod: 25

## 2021-11-10 PROCEDURE — P9016 RBC LEUKOCYTES REDUCED: HCPCS | Performed by: INTERNAL MEDICINE

## 2021-11-10 PROCEDURE — 87636 SARSCOV2 & INF A&B AMP PRB: CPT | Performed by: EMERGENCY MEDICINE

## 2021-11-10 PROCEDURE — 250N000011 HC RX IP 250 OP 636: Performed by: PHYSICIAN ASSISTANT

## 2021-11-10 PROCEDURE — 36415 COLL VENOUS BLD VENIPUNCTURE: CPT | Performed by: PHYSICIAN ASSISTANT

## 2021-11-10 PROCEDURE — 85041 AUTOMATED RBC COUNT: CPT | Performed by: EMERGENCY MEDICINE

## 2021-11-10 PROCEDURE — 36415 COLL VENOUS BLD VENIPUNCTURE: CPT | Performed by: INTERNAL MEDICINE

## 2021-11-10 PROCEDURE — 0W3P8ZZ CONTROL BLEEDING IN GASTROINTESTINAL TRACT, VIA NATURAL OR ARTIFICIAL OPENING ENDOSCOPIC: ICD-10-PCS | Performed by: INTERNAL MEDICINE

## 2021-11-10 PROCEDURE — 43236 UPPR GI SCOPE W/SUBMUC INJ: CPT | Performed by: INTERNAL MEDICINE

## 2021-11-10 PROCEDURE — 85018 HEMOGLOBIN: CPT | Performed by: PHYSICIAN ASSISTANT

## 2021-11-10 PROCEDURE — 93005 ELECTROCARDIOGRAM TRACING: CPT

## 2021-11-10 PROCEDURE — 250N000011 HC RX IP 250 OP 636: Performed by: INTERNAL MEDICINE

## 2021-11-10 PROCEDURE — 258N000003 HC RX IP 258 OP 636: Performed by: EMERGENCY MEDICINE

## 2021-11-10 PROCEDURE — 43270 EGD LESION ABLATION: CPT | Performed by: INTERNAL MEDICINE

## 2021-11-10 PROCEDURE — 250N000013 HC RX MED GY IP 250 OP 250 PS 637: Performed by: PHYSICIAN ASSISTANT

## 2021-11-10 PROCEDURE — 86900 BLOOD TYPING SEROLOGIC ABO: CPT | Performed by: EMERGENCY MEDICINE

## 2021-11-10 PROCEDURE — 250N000009 HC RX 250: Performed by: INTERNAL MEDICINE

## 2021-11-10 PROCEDURE — 36415 COLL VENOUS BLD VENIPUNCTURE: CPT | Performed by: EMERGENCY MEDICINE

## 2021-11-10 PROCEDURE — 84484 ASSAY OF TROPONIN QUANT: CPT | Performed by: INTERNAL MEDICINE

## 2021-11-10 PROCEDURE — 85610 PROTHROMBIN TIME: CPT | Performed by: EMERGENCY MEDICINE

## 2021-11-10 PROCEDURE — 250N000011 HC RX IP 250 OP 636: Performed by: EMERGENCY MEDICINE

## 2021-11-10 PROCEDURE — 85018 HEMOGLOBIN: CPT | Performed by: INTERNAL MEDICINE

## 2021-11-10 RX ORDER — METOPROLOL SUCCINATE 50 MG/1
150 TABLET, EXTENDED RELEASE ORAL DAILY
Status: DISCONTINUED | OUTPATIENT
Start: 2021-11-11 | End: 2021-11-12 | Stop reason: HOSPADM

## 2021-11-10 RX ORDER — METOPROLOL TARTRATE 1 MG/ML
5 INJECTION, SOLUTION INTRAVENOUS EVERY 5 MIN PRN
Status: DISCONTINUED | OUTPATIENT
Start: 2021-11-10 | End: 2021-11-12 | Stop reason: HOSPADM

## 2021-11-10 RX ORDER — NALOXONE HYDROCHLORIDE 0.4 MG/ML
0.4 INJECTION, SOLUTION INTRAMUSCULAR; INTRAVENOUS; SUBCUTANEOUS
Status: CANCELLED | OUTPATIENT
Start: 2021-11-10

## 2021-11-10 RX ORDER — FENTANYL CITRATE 50 UG/ML
INJECTION, SOLUTION INTRAMUSCULAR; INTRAVENOUS PRN
Status: COMPLETED | OUTPATIENT
Start: 2021-11-10 | End: 2021-11-10

## 2021-11-10 RX ORDER — LIDOCAINE 40 MG/G
CREAM TOPICAL
Status: DISCONTINUED | OUTPATIENT
Start: 2021-11-10 | End: 2021-11-10 | Stop reason: HOSPADM

## 2021-11-10 RX ORDER — IBUPROFEN 200 MG
400 TABLET ORAL 2 TIMES DAILY PRN
Status: ON HOLD | COMMUNITY
End: 2021-11-12

## 2021-11-10 RX ORDER — LIDOCAINE 40 MG/G
CREAM TOPICAL
Status: DISCONTINUED | OUTPATIENT
Start: 2021-11-10 | End: 2021-11-10

## 2021-11-10 RX ORDER — NALOXONE HYDROCHLORIDE 0.4 MG/ML
0.2 INJECTION, SOLUTION INTRAMUSCULAR; INTRAVENOUS; SUBCUTANEOUS
Status: CANCELLED | OUTPATIENT
Start: 2021-11-10

## 2021-11-10 RX ORDER — EPINEPHRINE IN SOD CHLOR,ISO 1 MG/10 ML
SYRINGE (ML) INTRAVENOUS PRN
Status: COMPLETED | OUTPATIENT
Start: 2021-11-10 | End: 2021-11-10

## 2021-11-10 RX ORDER — DILTIAZEM HYDROCHLORIDE 30 MG/1
30 TABLET, FILM COATED ORAL EVERY 6 HOURS SCHEDULED
Status: DISCONTINUED | OUTPATIENT
Start: 2021-11-10 | End: 2021-11-12 | Stop reason: HOSPADM

## 2021-11-10 RX ORDER — FUROSEMIDE 10 MG/ML
20 INJECTION INTRAMUSCULAR; INTRAVENOUS 2 TIMES DAILY
Status: DISCONTINUED | OUTPATIENT
Start: 2021-11-10 | End: 2021-11-12 | Stop reason: HOSPADM

## 2021-11-10 RX ORDER — FLUMAZENIL 0.1 MG/ML
0.2 INJECTION, SOLUTION INTRAVENOUS
Status: CANCELLED | OUTPATIENT
Start: 2021-11-10 | End: 2021-11-11

## 2021-11-10 RX ORDER — ONDANSETRON 4 MG/1
4 TABLET, ORALLY DISINTEGRATING ORAL EVERY 6 HOURS PRN
Status: DISCONTINUED | OUTPATIENT
Start: 2021-11-10 | End: 2021-11-12 | Stop reason: HOSPADM

## 2021-11-10 RX ORDER — ONDANSETRON 2 MG/ML
4 INJECTION INTRAMUSCULAR; INTRAVENOUS EVERY 6 HOURS PRN
Status: DISCONTINUED | OUTPATIENT
Start: 2021-11-10 | End: 2021-11-12 | Stop reason: HOSPADM

## 2021-11-10 RX ADMIN — TOPICAL ANESTHETIC 1 SPRAY: 200 SPRAY DENTAL; PERIODONTAL at 16:53

## 2021-11-10 RX ADMIN — PANTOPRAZOLE SODIUM 40 MG: 40 INJECTION, POWDER, FOR SOLUTION INTRAVENOUS at 20:57

## 2021-11-10 RX ADMIN — PANTOPRAZOLE SODIUM 80 MG: 40 INJECTION, POWDER, FOR SOLUTION INTRAVENOUS at 08:52

## 2021-11-10 RX ADMIN — SODIUM CHLORIDE 500 ML: 9 INJECTION, SOLUTION INTRAVENOUS at 08:52

## 2021-11-10 RX ADMIN — DILTIAZEM HYDROCHLORIDE 30 MG: 30 TABLET, FILM COATED ORAL at 21:04

## 2021-11-10 RX ADMIN — EPINEPHRINE 1 ML: 0.1 INJECTION, SOLUTION ENDOTRACHEAL; INTRACARDIAC; INTRAVENOUS at 16:59

## 2021-11-10 RX ADMIN — FUROSEMIDE 20 MG: 10 INJECTION, SOLUTION INTRAVENOUS at 21:04

## 2021-11-10 RX ADMIN — FENTANYL CITRATE 100 MCG: 50 INJECTION, SOLUTION INTRAMUSCULAR; INTRAVENOUS at 16:53

## 2021-11-10 RX ADMIN — MIDAZOLAM 2 MG: 1 INJECTION INTRAMUSCULAR; INTRAVENOUS at 16:53

## 2021-11-10 RX ADMIN — DILTIAZEM HYDROCHLORIDE 30 MG: 30 TABLET, FILM COATED ORAL at 16:07

## 2021-11-10 ASSESSMENT — ACTIVITIES OF DAILY LIVING (ADL)
ADLS_ACUITY_SCORE: 5
ADLS_ACUITY_SCORE: 5
ADLS_ACUITY_SCORE: 9
ADLS_ACUITY_SCORE: 7
ADLS_ACUITY_SCORE: 9
ADLS_ACUITY_SCORE: 5
ADLS_ACUITY_SCORE: 5
ADLS_ACUITY_SCORE: 7
ADLS_ACUITY_SCORE: 7
ADLS_ACUITY_SCORE: 5
ADLS_ACUITY_SCORE: 7
ADLS_ACUITY_SCORE: 5
ADLS_ACUITY_SCORE: 9

## 2021-11-10 ASSESSMENT — ENCOUNTER SYMPTOMS
DIZZINESS: 0
VOMITING: 1
ABDOMINAL PAIN: 1
LIGHT-HEADEDNESS: 0

## 2021-11-10 ASSESSMENT — MIFFLIN-ST. JEOR: SCORE: 2073.7

## 2021-11-10 NOTE — PROGRESS NOTES
RECEIVING UNIT ED HANDOFF REVIEW    ED Nurse Handoff Report was reviewed by: Martha Moon RN on November 10, 2021 at 11:33 AM

## 2021-11-10 NOTE — ED NOTES
Patient reports rectal bleeding since Monday with bowel movements. Denies taking blood thinners other than aspirin. Pt reported 1 episode of vomiting this morning to which he described as dark in color. Patient shaky, pale and tachycardic. Pt in A-fib on EKG and on monitor. Patient reports drinking daily (gin) until 1 week ago when he stopped. BLE edema which patient reports has recently gotten worse. He reports intolerance for exercise and with exertion- SOB.

## 2021-11-10 NOTE — H&P
[facility]    History and Physical - Hospitalist Service       Date of Admission:  11/10/2021    Assessment & Plan      Tremaine King is a 51 year old male with history of hypertension, ASD, mitral valve repair, KOTA on CPAP  admitted on 11/10/2021 with concerns of GI bleed and new AF.    #GI bleed: Likely upper from history with differentials of gastritis/gastric ulcer/esophageal varices with a history of drinking  - ensure 2 large bore IV Access  - type and screen and transfuse to ensure Hb>7 , platelet > 50 and INR <2   - NPO, IV fluids   - Protonix   -Discussed with GI consult and plan for upper endoscopy later today.  Will have cardiology evaluate the patient as well    #Hypertension  #History of mitral valve repair with an anoplasty ring in 2005 by Dr. Hernandez with postop course complicated by inappropriate sinus tachycardia  #History of ASD repair with mitral valve at age 3  #Atrial fibrillation: ? Subacute ?  Paroxysmal (was also noted during echo on 11/03)   #Anterolateral T wave inv, on EKG   -He had his oral metoprolol this morning.  We will continue as needed IV doses for now   -Hold PTA aspirin, amlodipine, lisinopril  -Cardiology evaluation for new onset atrial fibrillation, EKG changes and preop clearance.  His METs are in general greater than 4 but has been having increasing shortness of breath on walking 1 flight of stairs with gradually worsening chronic leg edema (recent echocardiogram 11/03 shows EF of 55-60% with mild RV dilation) .   -We will get troponin, BNP, chest x-ray  -He has gradually worsening leg edema and his echocardiogram did show dilation of IVC with abnormal respiratory variation in diameter.     # KOTA: Has been on fixed CPAP pressure  -Continue for now but considering new atrial fibrillation and change in his weight, he may need to follow with his sleep physician after discharge    #Chronic alcohol use  -Monitor for any withdrawal       Diet:    NPO for  now  DVT Prophylaxis: Pneumatic Compression Devices  Salas Catheter: Not present  Central Lines: None  Code Status:   Full     Clinically Significant Risk Factors Present on Admission             # Coagulation Defect: INR = 1.34 (Ref range: 0.85 - 1.15) and/or PTT = N/A on admission, will monitor for bleeding       Disposition Plan   Expected discharge:  recommended to prior living arrangement once hemoglobin stable and GI bleeding resolves, AF under control .     The patient's care was discussed with the Bedside Nurse, Patient, Patient's Family and GI Consultant.    Casimiro Madden MD, MD  [facility]  Securely message with the Vocera Web Console (learn more here)  Text page via [facility] Paging/Directory    ______________________________________________________________________    Chief Complaint   Malaena    History is obtained from the patient    History of Present Illness   Tremaine King is a 51 year old male with history of hypertension, ASD, mitral valve repair, KOTA on CPAP  admitted on 11/10/2021 with concerns of GI bleed and new AF.    As per him he had some weakness for more than a month and was found to be anemic by PMD and was planned to have a colonoscopy as outpatient.  Since Monday he notices stools to be dark and tarry and this morning had episode of vomiting with black particles so decided to present to the emergency department.  Complains of mild epigastric and left upper quadrant pain, no fever/chills/rigors  He does drink 3-4 drinks every day and recently had increased NSAID intake.  Denies any recent steroid use  Denies any previous history of upper or lower GI bleed.  Denies any family history of GI malignancy    Denies any chest pain/palpitations.  He says over the last few months gradually his edema has been worsening and he feels short of breath walking 1 flight of stairs.  Had a routine yearly echocardiogram last week and waiting to follow-up with his cardiologist. No  calf pain    Review of Systems    The 10 point Review of Systems is negative other than noted in the HPI or here.     Past Medical History    I have reviewed this patient's medical history and updated it with pertinent information if needed.   Past Medical History:   Diagnosis Date     ASD (atrial septal defect)     s/p repair     Lumbar disc herniation 05/2018    better after steroid inj and PT     Past Surgical History   I have reviewed this patient's surgical history and updated it with pertinent information if needed.  Past Surgical History:   Procedure Laterality Date     REPAIR ATRIAL SEPTAL DEFECT  AGE 3     REPAIR VALVE MITRAL  AGE 3 AND JUNE 2005    annuloplasty ring and MVR in June 2005     REPAIR VALVE MITRAL       Social History   I have reviewed this patient's social history and updated it with pertinent information if needed.  Social History     Tobacco Use     Smoking status: Former Smoker     Types: Cigars     Smokeless tobacco: Never Used     Tobacco comment: quit smoking cigarettes and cigars at ~ age 22 (was light smoker for about 4 years))   Substance Use Topics     Alcohol use: Yes     Comment: 1-2/day on weekdays, 2-3/day on wknds     Drug use: No     Family History   I have reviewed this patient's family history and updated it with pertinent information if needed.  Family History   Problem Relation Age of Onset     Diabetes Mother         onset past age 70       Prior to Admission Medications   Prior to Admission Medications   Prescriptions Last Dose Informant Patient Reported? Taking?   AFLURIA QUADRIVALENT injection  Self Yes No   Sig: Inject as directed.  To be administered by a pharmacist.   albuterol (PROAIR HFA/PROVENTIL HFA/VENTOLIN HFA) 108 (90 Base) MCG/ACT inhaler prn at prn Self No Yes   Sig: Inhale 2 puffs into the lungs every 6 hours as needed for shortness of breath / dyspnea   allopurinol (ZYLOPRIM) 300 MG tablet 11/9/2021 at am Self No Yes   Sig: Take 1 tablet (300 mg) by mouth  "daily   amLODIPine (NORVASC) 10 MG tablet 11/10/2021 at am Self No Yes   Sig: Take 1 tablet (10 mg) by mouth daily   amoxicillin (AMOXIL) 500 MG capsule prn at prn Self No Yes   Sig: Please take a total of 2 grams (four 500 mg capsules) 30-60 minutes prior to dental procedure.   beclomethasone HFA (QVAR REDIHALER) 80 MCG/ACT inhaler 11/9/2021 at pm Self No Yes   Sig: Inhale 1 puff into the lungs 2 times daily   ibuprofen (ADVIL/MOTRIN) 200 MG tablet prn at prn Self Yes Yes   Sig: Take 400 mg by mouth 2 times daily as needed for mild pain   lisinopril (ZESTRIL) 40 MG tablet 11/10/2021 at am Self No Yes   Sig: Take 1 tablet (40 mg) by mouth daily   metoprolol succinate ER (TOPROL-XL) 100 MG 24 hr tablet 11/10/2021 at am Self No Yes   Sig: TAKE 1 AND 1/2 TABLETS (150MG)  BY MOUTH ONE TIME DAILY.   order for DME  Self Yes No   Sig: Equipment being ordered: CPAP  Patient Tremaine King was set up at [place] on September 14, 2015. Patient received a [employer] DreamStation Auto CPAP Auto. Pressures were set at Auto 12 - 12 cm H2O.   Patient s ramp is 9 cm H2O for Auto and FLEX/EPR is A Flex.  Patient received a [employer] Mask name: BLUE GEL NASAL  Nasal mask Size Medium, heated tubing and heated humidifier.  Patient is not enrolled in the STM Program and does not need to meet compliance. Samina Arora   sildenafil (REVATIO) 20 MG tablet prn at prn Self No Yes   Sig: Take 1 tablet (20 mg) by mouth daily as needed (erectile dysfunction)      Facility-Administered Medications: None     Allergies   Allergies   Allergen Reactions     Hctz [Hydrochlorothiazide]      Hypokalemia       Spironolactone      hyponatremia       Physical Exam   BP (!) 119/92   Pulse 98   Temp 97.8  F (36.6  C) (Temporal)   Resp 17   Ht 1.803 m (5' 11\")   Wt 120.7 kg (266 lb)   SpO2 100%   BMI 37.10 kg/m    Gen- pleasant lying in bed  HEENT- NAD, CEASAR  Neck- supple, no JVD elevation, no thyromegaly  CVS- I+II+ no " m/r/g  RS- CTAB  Abdo- soft, no tenderness . No g/r/r   Ext- b/l LE edema   CNS- no focal signs     Data   Data reviewed today: I reviewed all medications, new labs and imaging results over the last 24 hours. I personally reviewed the EKG tracing showing AF w RVR, Anterolateral t wave inversion .    BMPRecent Labs   Lab 11/10/21  0853      POTASSIUM 3.8   CHLORIDE 108   ESPERANZA 8.6   CO2 24   BUN 57*   CR 1.06   *     CBC  Recent Labs   Lab 11/10/21  0853   WBC 10.3   RBC 2.56*   HGB 8.0*   HCT 24.5*   MCV 96   MCH 31.3   MCHC 32.7   RDW 13.2        INR  Recent Labs   Lab 11/10/21  0853   INR 1.34*     LFTs  Recent Labs   Lab 11/10/21  0853   ALKPHOS 97   AST 39   ALT 40   BILITOTAL 1.2   PROTTOTAL 6.9   ALBUMIN 3.0*      PANCNo lab results found in last 7 days.  Lab Results   Component Value Date    TROPI <0.012 02/09/2014    TROPI <0.012 10/09/2012    TROPI <0.012 07/18/2012    TROPI 0.023 07/17/2012    TROPONIN 0.00 10/09/2012    TROPONIN 0.78 (HH) 06/23/2005       No results found for this or any previous visit (from the past 24 hour(s)).

## 2021-11-10 NOTE — PROVIDER NOTIFICATION
MD Notification    Notified Person: MD    Notified Person Name: Dr. Madden    Notification Date/Time: 11/10  1500    Notification Interaction: Web based [employer]    Purpose of Notification:   Pt Hgb is low, 6.8. no transfusion order available. Consent needs to be signed with pt         Orders Received: Consent signed    Comments: Waiting on blood bank

## 2021-11-10 NOTE — PHARMACY-ADMISSION MEDICATION HISTORY
Pharmacy Medication History  Admission medication history interview status for the 11/10/2021  admission is complete. See EPIC admission navigator for prior to admission medications     Location of Interview: Patient room  Medication history sources: Patient and outside medication report    Significant changes made to the medication list:  None    In the past week, patient estimated taking medication this percent of the time: greater than 90%    Additional medication history information:   N/A    Medication reconciliation completed by provider prior to medication history? No    Time spent in this activity: 30 mins    Prior to Admission medications    Medication Sig Last Dose Taking? Auth Provider   albuterol (PROAIR HFA/PROVENTIL HFA/VENTOLIN HFA) 108 (90 Base) MCG/ACT inhaler Inhale 2 puffs into the lungs every 6 hours as needed for shortness of breath / dyspnea prn at prn Yes Samy Marcelino MD   allopurinol (ZYLOPRIM) 300 MG tablet Take 1 tablet (300 mg) by mouth daily 11/9/2021 at am Yes Samy Marcelino MD   amLODIPine (NORVASC) 10 MG tablet Take 1 tablet (10 mg) by mouth daily 11/10/2021 at am Yes Samy Marcelino MD   amoxicillin (AMOXIL) 500 MG capsule Please take a total of 2 grams (four 500 mg capsules) 30-60 minutes prior to dental procedure. prn at prn Yes Samy Marcelino MD   beclomethasone HFA (QVAR REDIHALER) 80 MCG/ACT inhaler Inhale 1 puff into the lungs 2 times daily 11/9/2021 at pm Yes Samy Marcelino MD   ibuprofen (ADVIL/MOTRIN) 200 MG tablet Take 400 mg by mouth 2 times daily as needed for mild pain prn at prn Yes Unknown, Entered By History   lisinopril (ZESTRIL) 40 MG tablet Take 1 tablet (40 mg) by mouth daily 11/10/2021 at am Yes Samy Marcelino MD   metoprolol succinate ER (TOPROL-XL) 100 MG 24 hr tablet TAKE 1 AND 1/2 TABLETS (150MG)  BY MOUTH ONE TIME DAILY. 11/10/2021 at am Yes Samy Marcelino MD   sildenafil (REVATIO) 20 MG tablet Take 1 tablet  (20 mg) by mouth daily as needed (erectile dysfunction) prn at prn Yes Samy Marcelino MD   AFLURIA QUADRIVALENT injection Inject as directed.  To be administered by a pharmacist.   Reported, Patient   order for DME Equipment being ordered: CPAP  Patient Tremaine King was set up at [place] on September 14, 2015. Patient received a [employer] DreamStation Auto CPAP Auto. Pressures were set at Auto 12 - 12 cm H2O.   Patient s ramp is 9 cm H2O for Auto and FLEX/EPR is A Flex.  Patient received a [employer] Mask name: BLUE GEL NASAL  Nasal mask Size Medium, heated tubing and heated humidifier.  Patient is not enrolled in the STM Program and does not need to meet compliance. Samina Arora   Reported, Patient       The information provided in this note is only as accurate as the sources available at the time of update(s)

## 2021-11-10 NOTE — CONSULTS
[facility]  Gastroenterology Consultation         Tremaine King  [address] [facility] [address]  51 year old male    Admission Date/Time: 11/10/2021  Primary Care Provider: Samy Marcelino  Referring / Attending Physician:  Dr. Casimiro Madden    We were asked to see the patient in consultation by Dr. Lake Madden for evaluation of GI bleed.      CC: melena and hematemesis    HPI:  Tremaine King is a 51 year old male who has a history of asthma, gout, atrial septal defect, atrial fibrillation not on blood thinners who presents with melena for 2 days. This morning had episode of vomiting and described as some coffee ground like material. He has had abdominal discomfort. Reports use of 2 tablets of ibuprofen three time daily for last few week for leg edema. He consumes 2-3 alcoholic beverages a day, none in last week.  Reports was seen by PCP in September and noted to have anemia. He was recommended to have a colonoscopy and has not had.     Patient denies bright red bloody stools, dizziness, lightheadedness, shortness of breath, fever, chills, chest pain, heartburn, or dysphagia. Denies prior EGD or colonoscopy.    VSS. EKG tracing in ED notes atrial fibrillation with rapid ventricular response. Incomplete RBBB. Labs remarkable for Hemoglobin 8.0, last draw 10/6/21 was 12.1 with baseline 14-15. Albumin 3. INR 1.34.    ROS: A comprehensive ten point review of systems was negative aside from those in mentioned in the HPI.      PAST MED HX:  I have reviewed this patient's medical history and updated it with pertinent information if needed.   Past Medical History:   Diagnosis Date     ASD (atrial septal defect)     s/p repair     Lumbar disc herniation 05/2018    better after steroid inj and PT       MEDICATIONS:   Prior to Admission Medications   Prescriptions Last Dose Informant Patient Reported? Taking?   AFLURIA QUADRIVALENT injection  Self Yes No   Sig: Inject as directed.   To be administered by a pharmacist.   albuterol (PROAIR HFA/PROVENTIL HFA/VENTOLIN HFA) 108 (90 Base) MCG/ACT inhaler prn at prn Self No Yes   Sig: Inhale 2 puffs into the lungs every 6 hours as needed for shortness of breath / dyspnea   allopurinol (ZYLOPRIM) 300 MG tablet 11/9/2021 at am Self No Yes   Sig: Take 1 tablet (300 mg) by mouth daily   amLODIPine (NORVASC) 10 MG tablet 11/10/2021 at am Self No Yes   Sig: Take 1 tablet (10 mg) by mouth daily   amoxicillin (AMOXIL) 500 MG capsule prn at prn Self No Yes   Sig: Please take a total of 2 grams (four 500 mg capsules) 30-60 minutes prior to dental procedure.   beclomethasone HFA (QVAR REDIHALER) 80 MCG/ACT inhaler 11/9/2021 at pm Self No Yes   Sig: Inhale 1 puff into the lungs 2 times daily   ibuprofen (ADVIL/MOTRIN) 200 MG tablet prn at prn Self Yes Yes   Sig: Take 400 mg by mouth 2 times daily as needed for mild pain   lisinopril (ZESTRIL) 40 MG tablet 11/10/2021 at am Self No Yes   Sig: Take 1 tablet (40 mg) by mouth daily   metoprolol succinate ER (TOPROL-XL) 100 MG 24 hr tablet 11/10/2021 at am Self No Yes   Sig: TAKE 1 AND 1/2 TABLETS (150MG)  BY MOUTH ONE TIME DAILY.   order for DME  Self Yes No   Sig: Equipment being ordered: CPAP  Patient Tremaine Knig was set up at [place] on September 14, 2015. Patient received a [employer] DreamStation Auto CPAP Auto. Pressures were set at Auto 12 - 12 cm H2O.   Patient s ramp is 9 cm H2O for Auto and FLEX/EPR is A Flex.  Patient received a [employer] Mask name: BLUE GEL NASAL  Nasal mask Size Medium, heated tubing and heated humidifier.  Patient is not enrolled in the STM Program and does not need to meet compliance. Samina Arora   sildenafil (REVATIO) 20 MG tablet prn at prn Self No Yes   Sig: Take 1 tablet (20 mg) by mouth daily as needed (erectile dysfunction)      Facility-Administered Medications: None       ALLERGIES:   Allergies   Allergen Reactions     Hctz [Hydrochlorothiazide]       Hypokalemia       Spironolactone      hyponatremia       SOCIAL HISTORY:  Social History     Tobacco Use     Smoking status: Former Smoker     Types: Cigars     Smokeless tobacco: Never Used     Tobacco comment: quit smoking cigarettes and cigars at ~ age 22 (was light smoker for about 4 years))   Substance Use Topics     Alcohol use: Yes     Comment: 1-2/day on weekdays, 2-3/day on wknds     Drug use: No       FAMILY HISTORY:  Family History   Problem Relation Age of Onset     Diabetes Mother         onset past age 70       PHYSICAL EXAM:   General  Alert, oriented and comfortable  Vital Signs with Ranges  Temp: 97.8  F (36.6  C) Temp src: Temporal BP: (!) 119/92 Pulse: 98   Resp: 17 SpO2: 100 % O2 Device: None (Room air)    No intake/output data recorded.    Constitutional: healthy, alert and no distress   Cardiovascular: negative, PMI normal. No lifts, heaves, or thrills. RRR. No murmurs, clicks gallops or rub  Respiratory: negative, Percussion normal. Good diaphragmatic excursion. Lungs clear  Abdomen: Abdomen soft, non-tender. BS normal. No masses, organomegaly          ADDITIONAL COMMENTS:   I reviewed the patient's new clinical lab test results.   Recent Labs   Lab Test 11/10/21  0853 10/06/21  0907 11/09/20  1131   WBC 10.3 7.8 8.5   HGB 8.0* 12.1* 14.8   MCV 96 97 91    139* 183   INR 1.34*  --   --      Recent Labs   Lab Test 11/10/21  0853 10/06/21  0907 11/09/20  1131   POTASSIUM 3.8 4.1 4.3   CHLORIDE 108 104 101   CO2 24 25 21   BUN 57* 18 23   ANIONGAP 9 6 11     Recent Labs   Lab Test 11/10/21  0853 10/06/21  0908 10/06/21  0907 11/09/20  1131   ALBUMIN 3.0*  --  3.7 4.0   BILITOTAL 1.2  --  1.6* 1.2   ALT 40  --  41 166*   AST 39  --  47* 147*   PROTEIN  --  100 *  --   --        I reviewed the patient's new imaging results.        CONSULTATION ASSESSMENT AND PLAN:    Tremaine King is a 51 year old male with c/o melena, coffee ground emesis with acute anemia. GI consulted on 11/10/21.    Active  Problems:  Gastrointestinal hemorrhage, unspecified gastrointestinal hemorrhage type  Melena  Hematemesis  Hemoglobin well below baseline  Melena for two days with coffee ground emesis  History of significant NSAID and alcohol use  Concern for upper GI bleed from PUD due to risk factors of NSAIDs and alcohol use vs neoplastic.    -- Plan EGD today once cleared by cardiology ( has current atrial fibrillation)  -- IV Pantoprazole 40 mg BID  -- Serial hemoglobin q 6 hours and transfuse for hemoglobin of less than 7  -- NPO    Atrial fibrillation, unspecified type (H)  Will need cardiology consult and clearance prior to proceeding with planned EGD today      [facility].  Office: [phone]  Cell : [phone] (Dr. Bird)  Cell: [phone] (Cindy Lujan PA-C)

## 2021-11-10 NOTE — CONSULTS
[facility]  Cardiology Consultation     Date of Admission:  11/10/2021  Date of Consult (When I saw the patient): 11/10/21  Reason for Consult: New onset of Atrial fibrillation     Primary Cardiologist: Dr. Gotti at [facility]    History of Present Illness   Tremaine King is a 51 year old male who presents with melena and coffee ground emesis.     Tremaine reports progressive SOB, peripheral edema, and palpitations since earlier this week with melena. Yesterday he had coffee ground emesis which prompted him to be seen in the ED. He describes he first noticed increased peripheral edema and decided to take high-dose ibuprofen to treat this. On average he drinks 3 glasses of hard liquor a day. He stopped drinking last week when he started to not feel very good. He normally is a very active person and has done long distance biking and 5K's but he admits he has been very sedentary over the last 1-2 years. He denies chest discomfort, syncope, orthopnea, or PND. He does not think he was diagnosed with atrial fibrillation in the past. Prior to the onset of SOB and melena, he was able to complete moderate level exertion (at least MET 4) with no limitations.     Tremaine's past medical history is notable for ASD repair at age 3, MVR with a mitral annuloplasty ring in 2005 for mitral insufficiency (reports at the time cor angio showed no CAD), hx of inappropriate sinus tachycardia (treated with high-dose metoprolol), hypertension, KOTA (compliant with CPAP), alcohol dependence (3 drinks of gin everyday), and chronic peripheral edema which has worsened in the last few months (of note, on high dose amlodipine).     On admission, Tremaine was found to be significantly anemic with Hgb of 8 from baseline of 14-15. BMP unremarkable. ECG showed atrial fibrillations with borderline RVR. Review of previous ECG several years ago show atrial fibrillation as well. NTproBNP is elevated. Troponin x1 negative. Baseline LDL 91.  Echocardiogram last week showed preserved LVEF with mild RV dilation and dysfunction. Mitral valve gradient was noted to be slightly higher at 8 mmHg. It appears this was further evaluated back in 2013 with MARIANGEL demonstrating no evidence of stenosis. IVC is dilated and he is mild pulmonary hypertension.     -------------------------------------------------------------------------------------------    Assessment:  Tremaine King is a 51 year old male who was admitted on 11/10/2021 with GI bleed. Cardiology consulted to comment on atrial fibrillation.    # Atrial Fibrillation RVR  -- ECG on admission shows borderline RVR  -- Pt is not aware of history of atrial fibrillation  -- Previous echo images showing severe atria   -- CHADSVASC score of 2   -- Previous ECG studies show atrial fibrillation     # Anemia of Acute Blood loss 2/2 GI bleed   -- GI plans for EGD    # TWI on ECG   -- initial trop negative; no reports of CP; unable to find documentation but pt reports no CAD found on cath at the time of his valve surgery   -- Given multiple cardiac risk factors will eventually need outpatient ischemic evaluation    # Acute HFpEF secondary to above  -- Worsening peripheral edema and SOB with recent echo showing increased filling pressures     # Hypertension  -- Controlled     # Hx of inappropriate sinus tachycardia  -- on high-dose metoprolol     # Hx of MV repair in 2005   -- Echo last week showed slightly increase mean gradient    # Distant Hx of ASD repair   # Alcohol Dependence  # KOTA    -------------------------------------------------------------------------------------------    Plan:   -- Initiate IV diuresis with IV lasix 20 mg BID  -- Continue with pta metoprolol 150 mg daily and consider addition of Diltiazem 30 mg q6h for better rate control  -- Patient OK to proceed to EGD   -- Recommend outpatient stress test once stable from anemia standpoint  -- Follow up with Dr. Gotti   -- Will need discussion of  anticoagulation for A fib    -------------------------------------------------------------------------------------------    Code Status    Prior    Past Medical History   I have reviewed this patient's medical history and updated it with pertinent information if needed.   Past Medical History:   Diagnosis Date     ASD (atrial septal defect)     s/p repair     Lumbar disc herniation 05/2018    better after steroid inj and PT       Past Surgical History   I have reviewed this patient's surgical history and updated it with pertinent information if needed.  Past Surgical History:   Procedure Laterality Date     REPAIR ATRIAL SEPTAL DEFECT  AGE 3     REPAIR VALVE MITRAL  AGE 3 AND JUNE 2005    annuloplasty ring and MVR in June 2005     REPAIR VALVE MITRAL         Prior to Admission Medications   Prior to Admission Medications   Prescriptions Last Dose Informant Patient Reported? Taking?   AFLURIA QUADRIVALENT injection  Self Yes No   Sig: Inject as directed.  To be administered by a pharmacist.   albuterol (PROAIR HFA/PROVENTIL HFA/VENTOLIN HFA) 108 (90 Base) MCG/ACT inhaler prn at prn Self No Yes   Sig: Inhale 2 puffs into the lungs every 6 hours as needed for shortness of breath / dyspnea   allopurinol (ZYLOPRIM) 300 MG tablet 11/9/2021 at am Self No Yes   Sig: Take 1 tablet (300 mg) by mouth daily   amLODIPine (NORVASC) 10 MG tablet 11/10/2021 at am Self No Yes   Sig: Take 1 tablet (10 mg) by mouth daily   amoxicillin (AMOXIL) 500 MG capsule prn at prn Self No Yes   Sig: Please take a total of 2 grams (four 500 mg capsules) 30-60 minutes prior to dental procedure.   beclomethasone HFA (QVAR REDIHALER) 80 MCG/ACT inhaler 11/9/2021 at pm Self No Yes   Sig: Inhale 1 puff into the lungs 2 times daily   ibuprofen (ADVIL/MOTRIN) 200 MG tablet prn at prn Self Yes Yes   Sig: Take 400 mg by mouth 2 times daily as needed for mild pain   lisinopril (ZESTRIL) 40 MG tablet 11/10/2021 at am Self No Yes   Sig: Take 1 tablet (40 mg)  by mouth daily   metoprolol succinate ER (TOPROL-XL) 100 MG 24 hr tablet 11/10/2021 at am Self No Yes   Sig: TAKE 1 AND 1/2 TABLETS (150MG)  BY MOUTH ONE TIME DAILY.   order for DME  Self Yes No   Sig: Equipment being ordered: CPAP  Patient Tremaine King was set up at [place] on September 14, 2015. Patient received a [employer] DreamStation Auto CPAP Auto. Pressures were set at Auto 12 - 12 cm H2O.   Patient s ramp is 9 cm H2O for Auto and FLEX/EPR is A Flex.  Patient received a [employer] Mask name: BLUE GEL NASAL  Nasal mask Size Medium, heated tubing and heated humidifier.  Patient is not enrolled in the STM Program and does not need to meet compliance. Samina Arora   sildenafil (REVATIO) 20 MG tablet prn at prn Self No Yes   Sig: Take 1 tablet (20 mg) by mouth daily as needed (erectile dysfunction)      Facility-Administered Medications: None     Allergies   Allergies   Allergen Reactions     Hctz [Hydrochlorothiazide]      Hypokalemia       Spironolactone      hyponatremia       Social History   I have reviewed this patient's social history and updated it with pertinent information if needed. rTemaine King  reports that he has quit smoking. His smoking use included cigars. He has never used smokeless tobacco. He reports current alcohol use. He reports that he does not use drugs.    Family History   I have reviewed this patient's family history and updated it with pertinent information if needed.   Family History   Problem Relation Age of Onset     Diabetes Mother         onset past age 70       --------------------------------------------------------------------------------------------    Review of Systems   The 10 point Review of Systems is negative other than noted in the HPI or here.     Temp:  [97.8  F (36.6  C)-99.7  F (37.6  C)] 99.7  F (37.6  C)  Pulse:  [] 106  Resp:  [10-26] 12  BP: (113-139)/(70-93) 121/70  SpO2:  [98 %-100 %] 100 %  266 lbs 0 oz    Constitutional:  Appears pale and diaphoretic. Oxygen on room air.   Eyes: Pupils equal and round, conjunctivae and lids unremarkable, sclera white, no xanthalasma,   ENT: Satisfactory dentition.  No cyanosis or pallor.  Neck: No thyromegaly.     Respiratory: Slightly diminished at the bases bilaterally.   Cardiovascular: Irregularly irregular rhythm with borderline rate control. 2/6 holosystolic murmur at LSB.  GI: Soft, nontender, no hepatosplenomegaly, no masses, active bowel sounds.    Skin: Skin discoloration consistent with chronic peripheral edema  Musculoskeletal: Normal muscle tone and strength.   Neuropsychiatric: Oriented to time place and person.  Affect normal.  No gross motor deficits.  Extremities: 2+ pitting edema bilaterally.   Vascular: 2+ DP and radial pulses bilaterally.     Data   Reviewed.       Je Darby PA-C   11/10/2021  Pager: [phone]

## 2021-11-10 NOTE — PROGRESS NOTES
EGD findings consistent with multiple erosions in the antrum of stomach patient also had a 10 mm deep ulcer in the prepyloric area with visible vessel and oozing.  Ulcer was cauterized with BiCAP and injected with epinephrine complete hemostasis was achieved.  I will recommend to start him on clear liquid diet tonight.  Continue on IV Protonix.  Serial hemoglobin.    Olivier Bird MD FACP

## 2021-11-10 NOTE — ED PROVIDER NOTES
History   Chief Complaint:  Melena (tarry stools since monday with black emisis today)       HPI   Tremaine King is a 51 year old male with history of asthma, gout, and atrial septal defect who presents with melena. The patient had a low RBC count at his PCP visit in September, and his doctor recommended a colonoscopy which the patient has not yet had. Two days ago the patient noticed tarry stool, and this morning after taking a shower he had an episode of vomiting. He describes the vomit as having dark particles in it. Here in the ED, he also notes abdominal tenderness. He has been taking 2 tablets of ibuprofen 3 times per day for the past few weeks because he believed this would help with his leg edema. He also drinks frequently, about 3-4 drinks per day, but has not had a drink in one week. Does not smoke. The patient had a congenital heart defect and had atrial septal defect repair as a child, and at age 28 had a mitral valve repair procedure. He is not on blood thinners. Denies dizziness or lightheadedness.      Review of Systems   Cardiovascular: Positive for leg swelling.   Gastrointestinal: Positive for abdominal pain and vomiting.        Dark, tarry stool   Neurological: Negative for dizziness and light-headedness.   All other systems reviewed and are negative.      Allergies:  Hydrochlorothiazide  Spironolactone    Medications:  Metoprolol succinate  Aspirin 81 mg  Allopurinol  Lisinopril  Amlodipine  Albuterol  Sildenafil    Past Medical History:     Asthma  Gout  Atrial septal defect  Lumbar disc herniation  KOTA  Obesity    Past Surgical History:    Repair atrial septal defect  Repair mitral valve    Family History:    Mother: diabetes    Social History:   with children  Works as a     Physical Exam     Patient Vitals for the past 24 hrs:   BP Temp Temp src Pulse Resp SpO2 Height Weight   11/10/21 1216 121/70 99.7  F (37.6  C) Oral 106 12 100 % -- --   11/10/21 1040 -- -- -- 98 17  "100 % -- --   11/10/21 1030 (!) 119/92 -- -- 100 11 99 % -- --   11/10/21 1007 -- -- -- 101 12 99 % -- --   11/10/21 1000 113/75 -- -- 109 26 98 % -- --   11/10/21 0943 -- -- -- 108 19 99 % -- --   11/10/21 0930 118/85 -- -- 109 10 99 % -- --   11/10/21 0922 -- -- -- 111 13 99 % -- --   11/10/21 0902 -- -- -- 116 16 100 % -- --   11/10/21 0901 -- -- -- 111 23 99 % -- --   11/10/21 0900 (!) 125/93 -- -- 116 -- 100 % -- --   11/10/21 0814 139/79 97.8  F (36.6  C) Temporal 73 18 100 % 1.803 m (5' 11\") 120.7 kg (266 lb)       Physical Exam  Constitutional: Middle aged white male supine. No respiratory distress.  HENT: No signs of trauma.   Eyes: EOM are normal. Pupils are equal, round, and reactive to light.   Neck: Normal range of motion. No JVD present. No cervical adenopathy.  Cardiovascular: Irregular irregular rhythm and rapid 2/6 systolic murmur at apex.  Exam reveals no gallop and no friction rub.  2+ femoral pulses.  No murmur heard.  Pulmonary/Chest: Bilateral breath sounds normal. No wheezes, rhonchi or rales.  Abdominal: Soft. No tenderness. No rebound or guarding.   Musculoskeletal: 1+ tense edema both lower legs with venous stasis changes.   Lymphadenopathy: No lymphadenopathy.   Neurological: Alert and oriented to person, place, and time. Normal strength. Coordination normal.   Skin: Skin is warm and dry. No rash noted. No erythema.   Rectal: Melena present.      Emergency Department Course   ECG  Atrial fibrillation with rapid ventricular response  Incomplete right bundle branch block  Left anterior fascicular block  ST & T wave abnormality, consider anterolateral ischemia  Atrial fibrillation now present as compared to prior, dated 04/11/14  Rate 108 bpm. MO interval * ms. QRS duration 112 ms. QT/QTc 388/519 ms. P-R-T axes * -50 125.     Laboratory:  Labs Ordered and Resulted from Time of ED Arrival to Time of ED Departure   INR - Abnormal       Result Value    INR 1.34 (*)    COMPREHENSIVE METABOLIC " PANEL - Abnormal    Sodium 141      Potassium 3.8      Chloride 108      Carbon Dioxide (CO2) 24      Anion Gap 9      Urea Nitrogen 57 (*)     Creatinine 1.06      Calcium 8.6      Glucose 129 (*)     Alkaline Phosphatase 97      AST 39      ALT 40      Protein Total 6.9      Albumin 3.0 (*)     Bilirubin Total 1.2      GFR Estimate 80     CBC WITH PLATELETS AND DIFFERENTIAL - Abnormal    WBC Count 10.3      RBC Count 2.56 (*)     Hemoglobin 8.0 (*)     Hematocrit 24.5 (*)     MCV 96      MCH 31.3      MCHC 32.7      RDW 13.2      Platelet Count 202      % Neutrophils 70      % Lymphocytes 11      % Monocytes 16      % Eosinophils 1      % Basophils 1      % Immature Granulocytes 1      NRBCs per 100 WBC 0      Absolute Neutrophils 7.3      Absolute Lymphocytes 1.1      Absolute Monocytes 1.6 (*)     Absolute Eosinophils 0.1      Absolute Basophils 0.1      Absolute Immature Granulocytes 0.1 (*)     Absolute NRBCs 0.0     INFLUENZA A/B & SARS-COV2 PCR MULTIPLEX - Normal    Influenza A target Negative      Influenza B target Negative      SARS CoV2 PCR Negative     HEMOGLOBIN   TROPONIN I   NT PROBNP INPATIENT   TYPE AND SCREEN, ADULT    ABO/RH(D) O POS      Antibody Screen Negative      SPECIMEN EXPIRATION DATE [id number]     ABO/RH TYPE AND SCREEN        Emergency Department Course:  Reviewed:  I reviewed nursing notes, vitals, past medical history, Care Everywhere and [facility]    Assessments:  0826 I obtained history and examined the patient as noted above.     1012 I rechecked the patient and explained findings.    Consults:  1029 I spoke with Dr. Madden, Hospitalist, who agrees to accept the patient.    1040 I spoke with Dr. Bird, GI, regarding the patient's presentation    Interventions:  0852 0.9% sodium chloride 500 mL IV    0852 Protonix 80 mg IV    Disposition:  The patient was admitted to the hospital under the care of Dr. Madden.     Impression & Plan     Medical Decision Making:  This is a 51 year old  who presents with a week of black stools as well as one episode of vomiting today where it was clear but there were some black pieces in it. He states that he has not felt well for a while. He went to see his doctor one month ago, had some labs drawn, and had a hemoglobin of 12.1 at that time. It was recommended that he see GI to get at least a colonoscopy. Patient has noted some swollen legs also and this past week has taken Advil 2-3 times per day. He had been a drinker until this week with 3-4 drinks per day but has stopped that. Patient has also had cardiac congential disease and has had mitral valve repair and AST closure. He has had different arrhythmias at times and is on Metoprolol but does not recall being in atrial fibrillation. He does take a baby aspirin daily. On exam he appears pale and he has a slightly rapid irregular rhythm. His abdomen is soft, but there is melena on rectal exam. IV was started, he was given fluids tenuously, given his edema, and he was given 80 mg Protonix. His EKG shows atrial fibrillation with a slightly rapid ventricular response between 100 and 110. Labs have come back, his hemoglobin is now 8, his platelets are okay. I have contacted the hospitalst as well as Dr. Bird and he will be admitted for further evaluation and treatment.    Diagnosis:    ICD-10-CM    1. Gastrointestinal hemorrhage, unspecified gastrointestinal hemorrhage type  K92.2    2. Atrial fibrillation, unspecified type (H)  I48.91    3. Congenital heart disease  Q24.9        Scribe Disclosure:  I, Stewart Dias, am serving as a scribe at 8:25 AM on 11/10/2021 to document services personally performed by Sylvain Chaidez MD based on my observations and the provider's statements to me.              Sylvain Chaidez MD  11/10/21 3397

## 2021-11-10 NOTE — ED NOTES
"[facility]  ED Nurse Handoff Report    ED Chief complaint: Melena (tarry stools since monday with black emisis today)      ED Diagnosis:   Final diagnoses:   Gastrointestinal hemorrhage, unspecified gastrointestinal hemorrhage type   Atrial fibrillation, unspecified type (H)   Congenital heart disease       Code Status: hospitalist to address    Allergies:   Allergies   Allergen Reactions     Hctz [Hydrochlorothiazide]      Hypokalemia       Spironolactone      hyponatremia       Patient Story: patient brought to ED by himself. Pt reports rectal bleeding with bowel movements since Monday. In Sept pt noted to have low hemoglobin, was supposed to follow up with GI but did not do so. Pt reports 1 episode of vomiting this morning that was dark in color. Hemoglobin today is 8. Pt has BLE edema, worsened recently. Pt takes aspirin daily, no blood thinners. Pt has been taking advil for his \"leg swelling.\" patient pale in color, shaky and cool to touch. Pt initially in A-fib on monitor and EKG. Pt received 500mL IVF and 80mg IV push Protonix. Patient was drinking gin daily, stopped drinking last week. Denies withdrawal seizures.  Focused Assessment:  See above    Treatments and/or interventions provided: see MAR  Patient's response to treatments and/or interventions:     To be done/followed up on inpatient unit:  none at this time    Does this patient have any cognitive concerns?: N/A    Activity level - Baseline/Home:  Independent  Activity Level - Current:   Stand with Assist    Patient's Preferred language: English   Needed?: No    Isolation: None  Infection: Not Applicable  Patient tested for COVID 19 prior to admission: YES  Bariatric?: No    Vital Signs:   Vitals:    11/10/21 1000 11/10/21 1007 11/10/21 1030 11/10/21 1040   BP: 113/75  (!) 119/92    Pulse: 109 101 100 98   Resp: 26 12 11 17   Temp:       TempSrc:       SpO2: 98% 99% 99% 100%   Weight:       Height:           Cardiac " Rhythm:Cardiac Rhythm: Atrial fibrillation    Was the PSS-3 completed:   Yes  What interventions are required if any?               Family Comments:   OBS brochure/video discussed/provided to patient/family: N/A              Name of person given brochure if not patient:               Relationship to patient:     For the majority of the shift this patient's behavior was Green.   Behavioral interventions performed were .    ED NURSE PHONE NUMBER: [phone]

## 2021-11-11 ENCOUNTER — APPOINTMENT (OUTPATIENT)
Dept: GENERAL RADIOLOGY | Facility: CLINIC | Age: 51
End: 2021-11-11
Attending: INTERNAL MEDICINE
Payer: COMMERCIAL

## 2021-11-11 LAB
ANION GAP SERPL CALCULATED.3IONS-SCNC: 6 MMOL/L (ref 3–14)
BUN SERPL-MCNC: 44 MG/DL (ref 7–30)
CALCIUM SERPL-MCNC: 7.9 MG/DL (ref 8.5–10.1)
CHLORIDE BLD-SCNC: 106 MMOL/L (ref 94–109)
CO2 SERPL-SCNC: 26 MMOL/L (ref 20–32)
CREAT SERPL-MCNC: 1.27 MG/DL (ref 0.66–1.25)
ERYTHROCYTE [DISTWIDTH] IN BLOOD BY AUTOMATED COUNT: 14 % (ref 10–15)
GFR SERPL CREATININE-BSD FRML MDRD: 64 ML/MIN/1.73M2
GLUCOSE BLD-MCNC: 99 MG/DL (ref 70–99)
HCT VFR BLD AUTO: 23 % (ref 40–53)
HGB BLD-MCNC: 7.6 G/DL (ref 13.3–17.7)
HGB BLD-MCNC: 8.4 G/DL (ref 13.3–17.7)
HGB BLD-MCNC: 8.6 G/DL (ref 13.3–17.7)
MAGNESIUM SERPL-MCNC: 1 MG/DL (ref 1.6–2.3)
MAGNESIUM SERPL-MCNC: 1.1 MG/DL (ref 1.6–2.3)
MCH RBC QN AUTO: 32.2 PG (ref 26.5–33)
MCHC RBC AUTO-ENTMCNC: 33 G/DL (ref 31.5–36.5)
MCV RBC AUTO: 98 FL (ref 78–100)
PHOSPHATE SERPL-MCNC: 4.1 MG/DL (ref 2.5–4.5)
PLATELET # BLD AUTO: 125 10E3/UL (ref 150–450)
POTASSIUM BLD-SCNC: 3.6 MMOL/L (ref 3.4–5.3)
RBC # BLD AUTO: 2.36 10E6/UL (ref 4.4–5.9)
SODIUM SERPL-SCNC: 138 MMOL/L (ref 133–144)
WBC # BLD AUTO: 7 10E3/UL (ref 4–11)

## 2021-11-11 PROCEDURE — 83735 ASSAY OF MAGNESIUM: CPT | Performed by: INTERNAL MEDICINE

## 2021-11-11 PROCEDURE — 250N000013 HC RX MED GY IP 250 OP 250 PS 637: Performed by: PHYSICIAN ASSISTANT

## 2021-11-11 PROCEDURE — 120N000001 HC R&B MED SURG/OB

## 2021-11-11 PROCEDURE — 250N000011 HC RX IP 250 OP 636: Performed by: PHYSICIAN ASSISTANT

## 2021-11-11 PROCEDURE — 84100 ASSAY OF PHOSPHORUS: CPT | Performed by: INTERNAL MEDICINE

## 2021-11-11 PROCEDURE — 99233 SBSQ HOSP IP/OBS HIGH 50: CPT | Performed by: INTERNAL MEDICINE

## 2021-11-11 PROCEDURE — C9113 INJ PANTOPRAZOLE SODIUM, VIA: HCPCS | Performed by: PHYSICIAN ASSISTANT

## 2021-11-11 PROCEDURE — 36415 COLL VENOUS BLD VENIPUNCTURE: CPT | Performed by: INTERNAL MEDICINE

## 2021-11-11 PROCEDURE — 99232 SBSQ HOSP IP/OBS MODERATE 35: CPT | Performed by: INTERNAL MEDICINE

## 2021-11-11 PROCEDURE — 94660 CPAP INITIATION&MGMT: CPT

## 2021-11-11 PROCEDURE — 250N000011 HC RX IP 250 OP 636: Performed by: INTERNAL MEDICINE

## 2021-11-11 PROCEDURE — 80048 BASIC METABOLIC PNL TOTAL CA: CPT | Performed by: INTERNAL MEDICINE

## 2021-11-11 PROCEDURE — 85018 HEMOGLOBIN: CPT | Performed by: INTERNAL MEDICINE

## 2021-11-11 PROCEDURE — 85027 COMPLETE CBC AUTOMATED: CPT | Performed by: INTERNAL MEDICINE

## 2021-11-11 PROCEDURE — 999N000157 HC STATISTIC RCP TIME EA 10 MIN

## 2021-11-11 PROCEDURE — 71046 X-RAY EXAM CHEST 2 VIEWS: CPT

## 2021-11-11 RX ORDER — PANTOPRAZOLE SODIUM 40 MG/1
40 TABLET, DELAYED RELEASE ORAL
Status: DISCONTINUED | OUTPATIENT
Start: 2021-11-11 | End: 2021-11-12 | Stop reason: HOSPADM

## 2021-11-11 RX ORDER — MAGNESIUM SULFATE HEPTAHYDRATE 40 MG/ML
4 INJECTION, SOLUTION INTRAVENOUS EVERY 4 HOURS
Status: COMPLETED | OUTPATIENT
Start: 2021-11-11 | End: 2021-11-11

## 2021-11-11 RX ADMIN — PANTOPRAZOLE SODIUM 40 MG: 40 TABLET, DELAYED RELEASE ORAL at 16:29

## 2021-11-11 RX ADMIN — DILTIAZEM HYDROCHLORIDE 30 MG: 30 TABLET, FILM COATED ORAL at 20:14

## 2021-11-11 RX ADMIN — MAGNESIUM SULFATE HEPTAHYDRATE 4 G: 40 INJECTION, SOLUTION INTRAVENOUS at 06:07

## 2021-11-11 RX ADMIN — DILTIAZEM HYDROCHLORIDE 30 MG: 30 TABLET, FILM COATED ORAL at 09:13

## 2021-11-11 RX ADMIN — FUROSEMIDE 20 MG: 10 INJECTION, SOLUTION INTRAVENOUS at 09:15

## 2021-11-11 RX ADMIN — MAGNESIUM SULFATE HEPTAHYDRATE 4 G: 40 INJECTION, SOLUTION INTRAVENOUS at 08:17

## 2021-11-11 RX ADMIN — DILTIAZEM HYDROCHLORIDE 30 MG: 30 TABLET, FILM COATED ORAL at 03:53

## 2021-11-11 RX ADMIN — FUROSEMIDE 20 MG: 10 INJECTION, SOLUTION INTRAVENOUS at 20:14

## 2021-11-11 RX ADMIN — PANTOPRAZOLE SODIUM 40 MG: 40 INJECTION, POWDER, FOR SOLUTION INTRAVENOUS at 09:15

## 2021-11-11 RX ADMIN — METOPROLOL SUCCINATE 150 MG: 50 TABLET, EXTENDED RELEASE ORAL at 09:12

## 2021-11-11 RX ADMIN — DILTIAZEM HYDROCHLORIDE 30 MG: 30 TABLET, FILM COATED ORAL at 16:29

## 2021-11-11 ASSESSMENT — ACTIVITIES OF DAILY LIVING (ADL)
ADLS_ACUITY_SCORE: 7

## 2021-11-11 ASSESSMENT — MIFFLIN-ST. JEOR: SCORE: 1966.65

## 2021-11-11 NOTE — PROGRESS NOTES
[id number]: A/OX4.Tele: A-fib CVR w/ BBB.Soft BP, other VSS on RA. HANNON. CPAP at night. BLE edema 2-3+, elevated. Mag 1.1, replaced this morning. Hgb 8.4, serial Hgb q6hrs. Seen by Cardiology and GI today, refer to notes. CXR done for SOB, refer to results.Capri mec soft diet well. Likely to discharge home tomorrow.

## 2021-11-11 NOTE — PROGRESS NOTES
[facility]  Gastroenterology Progress Note     Tremaine King MRN# [id number]   YOB: 1970 Age: 51 year old          Assessment and Plan:   Tremaine King is a 51 year old male with c/o melena, coffee ground emesis with acute anemia. GI consulted on 11/10/21.     Active Problems:  Gastrointestinal hemorrhage, unspecified gastrointestinal hemorrhage type  Melena  Hematemesis  Melena for two days with coffee ground emesis  History of significant NSAID and alcohol use  Hemoglobin 6.6->7.6 stable  Concern for upper GI bleed from PUD due to risk factors of NSAIDs and alcohol use vs neoplastic.  11/10 EGD noted gastric ulcer with adherent clot. Treated with bipolar cautery and injected.     -- Switch to oral Pantoprazole 40 mg BID  -- Serial hemoglobin q 6 hours and transfuse for hemoglobin of less than 7  -- Advance to full liquid diet and then soft once next hemoglobin done and stable. If stable ok with GI to discharge patient  -- Repeat EGD in 8 weeks to check or healing  -- Avoid NSAIDs and alcohol             Interval History:   no new complaints, doing well, denies chest pain, denies shortness of breath, denies abdominal pain and doing well; no cp, sob, n/v/d, or abd pain.              Review of Systems:   C: NEGATIVE for fever, chills, change in weight  E/M: NEGATIVE for ear, mouth and throat problems  R: NEGATIVE for significant cough or SOB  CV: NEGATIVE for chest pain, palpitations or peripheral edema             Medications:   I have reviewed this patient's current medications    diltiazem  30 mg Oral Q6H MYNOR     furosemide  20 mg Intravenous BID     magnesium sulfate  4 g Intravenous Q4H     metoprolol succinate ER  150 mg Oral Daily     pantoprazole (PROTONIX) IV  40 mg Intravenous Q12H     sodium chloride (PF)  3 mL Intracatheter Q8H                  Physical Exam:   Vitals were reviewed  Vital Signs with Ranges  Temp:  [97.5  F (36.4  C)-99.9  F (37.7  C)] 98.3  F (36.8   C)  Pulse:  [] 74  Resp:  [11-26] 14  BP: ()/(42-92) 112/56  SpO2:  [92 %-100 %] 96 %  I/O last 3 completed shifts:  In: 700   Out: -   Constitutional: healthy, alert and no distress   Cardiovascular: negative, PMI normal. No lifts, heaves, or thrills. RRR. No murmurs, clicks gallops or rub  Respiratory: negative, Percussion normal. Good diaphragmatic excursion. Lungs clear  Abdomen: Abdomen soft, non-tender. BS normal. No masses, organomegaly             Data:   I reviewed the patient's new clinical lab test results.   Recent Labs   Lab Test 11/11/21  0439 11/10/21  2116 11/10/21  1433 11/10/21  0853 10/06/21  0907   WBC 7.0  --   --  10.3 7.8   HGB 7.6* 6.6* 6.8* 8.0* 12.1*   MCV 98  --   --  96 97   *  --   --  202 139*   INR  --   --   --  1.34*  --      Recent Labs   Lab Test 11/11/21  0439 11/10/21  0853 10/06/21  0907   POTASSIUM 3.6 3.8 4.1   CHLORIDE 106 108 104   CO2 26 24 25   BUN 44* 57* 18   ANIONGAP 6 9 6     Recent Labs   Lab Test 11/10/21  0853 10/06/21  0908 10/06/21  0907 11/09/20  1131   ALBUMIN 3.0*  --  3.7 4.0   BILITOTAL 1.2  --  1.6* 1.2   ALT 40  --  41 166*   AST 39  --  47* 147*   PROTEIN  --  100 *  --   --        I reviewed the patient's new imaging results.    All laboratory data reviewed  All imaging studies reviewed by me.    Cindy Lujan PA-C,  11/11/2021  [facility]  Office : [phone]  Cell: [phone] (Dr. Bird)  Cell: [phone] (Cindy Lujan PA-C)

## 2021-11-11 NOTE — PROVIDER NOTIFICATION
MD Notification    Notified Person: MD    Notified Person Name: Blaise      Notification Date/Time: 11/11/39 0341    Notification Interaction: amcom page    Purpose of Notification:39 beats of v-tach noted on tele, asymptomatic.      Orders Received: Mag and phos ordered with am labs     Comments:

## 2021-11-11 NOTE — PROGRESS NOTES
"Cardiology Progress Note          Assessment and Plan:         50 YO M status post ASD repair at age 3, MV repair in  (cor angio prior to surgery demonstrated no CAD) , admitted to [facility] with HANNON, palpitations, and worsening LE edema. He also complained of melena and was found to be anemic. He was also in AF with RVR, although not on anticoagulation. This has not been reported before, although his echo reports have reported AF in the past.    IMPRESSION:    1. Anemia secondary to UGI bleed  2. AF with RVR, rates much improved. One 39 beat asymptomatic run of NSVT last night.   3. HFpEF, improving  4. MV repair   5. ASD repair    PLAN  - Rates much improved, would switch to diltiazem  MG daily and toprol Xl at discharge  - Would switch diuretic regimen to lasix 20 MG daily tomorrow  -  The patient should follow up with Dr Gotti as scheduled next week. No anticoagulation until cleared from a GI standpoint and discussed with Dr Gotti.   - Regarding his NSVT run, this was asymptomatic and in the context of normal LVEF on echo I would recommend continuation of beta blockade for now. A 48 Hour Holter monitor should be ordered upon discharge.   - An outpatient ischemic evaluation should be considered if he has recurrent NSVT.   - please call with questions.           Interval History:     Feels well. Breathing has improved. EGD demonstrated gastric ulcer which was cauterized.              Review of Systems:   As per subjective, otherwise 5 systems reviewed and negative.           Physical Exam:   Blood pressure 112/56, pulse 74, temperature 98.3  F (36.8  C), temperature source Oral, resp. rate 14, height 1.803 m (5' 11\"), weight 110 kg (242 lb 6.4 oz), SpO2 96 %.      Vital Sign Ranges  Temperature Temp  Av.7  F (37.1  C)  Min: 97.5  F (36.4  C)  Max: 99.9  F (37.7  C)   Blood pressure Systolic (24hrs), Av , Min:92 , Max:121        Diastolic (24hrs), Av, Min:42, Max:78      Pulse Pulse  Av  Min: " 67  Max: 106   Respirations Resp  Av.3  Min: 12  Max: 21   Pulse oximetry SpO2  Av.3 %  Min: 92 %  Max: 100 %         Intake/Output Summary (Last 24 hours) at 2021 1126  Last data filed at 2021 0200  Gross per 24 hour   Intake 700 ml   Output --   Net 700 ml       Constitutional: NAD  Skin: Warm and dry  Neck: No JVD  Lungs: CTA  Cardiovascular: irregular  Abdomen: Soft, non tender.  Extremities and Back: +1 BLE edema  Neurological: Nonfocal           Medications:     I have reviewed this patient's current medications.              Data:     Labs reviewed.           Tomas Lopez MD, M.D.

## 2021-11-11 NOTE — PROGRESS NOTES
[facility]    Medicine Progress Note - Hospitalist Service       Date of Admission:  11/10/2021    Assessment & Plan           Tremaine King is a 51 year old male with history of hypertension, ASD, mitral valve repair, KOTA on CPAP  admitted on 11/10/2021 with concerns of GI bleed and new AF.     #GI bleed:  Upper  -Appreciate GI review no status post EGD 11/10  noted gastric ulcer with adherent clot. Treated with bipolar cautery and injected.  - ensure 2 large bore IV Access  - type and screen and transfuse to ensure Hb>7 , platelet > 50 and INR <2 . Status post 2 unit PRBC 11/10  -Continue Protonix 40 mg oral twice daily  -Repeat EGD in 8 weeks to check for healing  -Need clarification from GI about timing for anticoagulation    #Hypertension  #History of mitral valve repair with an anoplasty ring in 2005 by Dr. Hernandez with postop course complicated by inappropriate sinus tachycardia  #History of ASD repair with mitral valve at age 3  #Atrial fibrillation: ? Subacute ?  Paroxysmal (was also noted during echo on 11/03)   #Anterolateral T wave inv, on EKG   -Appreciate cardiology evaluation started on IV diuretics 11/10.  -Continue on rate control with diltiazem and Toprol. Further adjustments as per cardiology  -Episode of nonsustained ventricular tachycardia last night. Paged Dr. Lopez to review  Vitals:    11/10/21 0814 11/11/21 0500   Weight: 120.7 kg (266 lb) 110 kg (242 lb 6.4 oz)     Intake/Output Summary (Last 24 hours) at 11/11/2021 1308  Last data filed at 11/11/2021 0200  Gross per 24 hour   Intake 700 ml   Output --   Net 700 ml         # KOTA: Has been on fixed CPAP pressure  - follow with his sleep physician after discharge      #Chronic alcohol use  -Monitor for any withdrawal     Diet: Mechanical/Dental Soft Diet    DVT Prophylaxis: Mechanical for now  Salas Catheter: Not present  Central Lines: None  Code Status: Full Code      Disposition Plan   Expected discharge: 11/12/2021    "recommended to prior living arrangement once Adequate diuresis and cleared by cardiology.     The patient's care was discussed with the Bedside Nurse, Care Coordinator/ and Patient.    Casimiro Madden MD, MD  Hospitalist Service  [facility]  Securely message with the Vocera Web Console (learn more here)  Text page via Behalf Paging/Directory    \"This dictation was performed with voice recognition software and may contain errors,  omissions and inadvertent word substitution.\"       Clinically Significant Risk Factors Present on Admission               ______________________________________________________________________    Interval History   Last 24-hour events noted. Feels better today denies any chest pain/palpitations  Had a run of nonsustained ventricular tachycardia last night    No further episode of nausea/vomiting/bleeding  4 point ROS done and negative unless mentioned     Data reviewed today: I reviewed all medications, new labs and imaging results over the last 24 hours. I personally reviewed the chest x-ray image(s) showing As below.    Physical Exam   Vital Signs: Temp: 98.3  F (36.8  C) Temp src: Oral BP: 112/56 Pulse: 74   Resp: 14 SpO2: 96 % O2 Device: None (Room air)    Weight: 242 lbs 6.4 oz  Gen- pleasant lying in bed  HEENT- NAD, CEASAR  Neck- supple, no JVD elevation, no thyromegaly  CVS- I+II+ no m/r/g  RS- CTAB  Abdo- soft, no tenderness . No g/r/r   Ext- b/l LE edema   CNS- no focal signs         Data    BMPRecent Labs   Lab 11/11/21  0439 11/10/21  0853    141   POTASSIUM 3.6 3.8   CHLORIDE 106 108   ESPERANZA 7.9* 8.6   CO2 26 24   BUN 44* 57*   CR 1.27* 1.06   GLC 99 129*     CBC  Recent Labs   Lab 11/11/21  1209 11/11/21  0439 11/10/21  1433 11/10/21  0853   WBC  --  7.0  --  10.3   RBC  --  2.36*  --  2.56*   HGB 8.6* 7.6*   < > 8.0*   HCT  --  23.0*  --  24.5*   MCV  --  98  --  96   MCH  --  32.2  --  31.3   MCHC  --  33.0  --  32.7   RDW  --  14.0  " --  13.2   PLT  --  125*  --  202    < > = values in this interval not displayed.     INR  Recent Labs   Lab 11/10/21  0853   INR 1.34*     LFTs  Recent Labs   Lab 11/10/21  0853   ALKPHOS 97   AST 39   ALT 40   BILITOTAL 1.2   PROTTOTAL 6.9   ALBUMIN 3.0*      PANCNo lab results found in last 7 days.    No results found for this or any previous visit (from the past 24 hour(s)).

## 2021-11-11 NOTE — PLAN OF CARE
Pt is A and O X4. VSS on RA.  Tele: A-FIB with CVR+ BBB. Denies pain and nausea. Slight discomfort to upper quadrant. HGB 6.8, 1 of 2  units of blood transfusion in progress at this time. Pt had EGD completed today: see GI note. On serial hemoglobin checks, and clear liq diet  tonight. PIV infusing. SBA in the room. Cardiology and GI following.

## 2021-11-11 NOTE — PROVIDER NOTIFICATION
MD Notification    Notified Person: MD    Notified Person Name: Blaise    Notification Date/Time: 11/11/2021 6833    Notification Interaction: Amcom page    Purpose of Notification: Pts Mg is 1.0, no protocols ordered.     Orders Received:    Comments:

## 2021-11-11 NOTE — PLAN OF CARE
Pt is A and O X4. BP runs low. Tmax  99.2 Denies pain and nausea. Blood (bag 2 of 2) given. H&H recheck 7.6. Had a 39 beat run a V-tach. MD aware.  Pt asymptomatic. SBA. Mag 1.0. will replace via IV. Cardiology and GI following.

## 2021-11-11 NOTE — PLAN OF CARE
PT Aox4, hypotensive other VSS on RA, CMS intact, IND in room. Denied any pain or nausea this AM. Tolerating mechanical soft diet, adequate fluid intake. PIV SL. Voiding w/o difficulty, + flatus, + BS, + BM this shift. Hgb: 8.6 this afternoon. Tele: Afib CVR w/ BBB. Mag replaced- recheck in the AM. Ortho negative this shift. Possible discharge tomorrow pending progress.

## 2021-11-12 ENCOUNTER — APPOINTMENT (OUTPATIENT)
Dept: ULTRASOUND IMAGING | Facility: CLINIC | Age: 51
End: 2021-11-12
Attending: HOSPITALIST
Payer: COMMERCIAL

## 2021-11-12 ENCOUNTER — HOSPITAL ENCOUNTER (INPATIENT)
Dept: CARDIOLOGY | Facility: CLINIC | Age: 51
End: 2021-11-12
Attending: INTERNAL MEDICINE
Payer: COMMERCIAL

## 2021-11-12 VITALS
RESPIRATION RATE: 18 BRPM | BODY MASS INDEX: 33.24 KG/M2 | WEIGHT: 237.44 LBS | HEIGHT: 71 IN | SYSTOLIC BLOOD PRESSURE: 104 MMHG | TEMPERATURE: 98.7 F | HEART RATE: 65 BPM | OXYGEN SATURATION: 96 % | DIASTOLIC BLOOD PRESSURE: 52 MMHG

## 2021-11-12 DIAGNOSIS — I48.91 ATRIAL FIBRILLATION, UNSPECIFIED TYPE (H): ICD-10-CM

## 2021-11-12 LAB
ALBUMIN SERPL-MCNC: 2.9 G/DL (ref 3.4–5)
ALP SERPL-CCNC: 85 U/L (ref 40–150)
ALT SERPL W P-5'-P-CCNC: 91 U/L (ref 0–70)
ANION GAP SERPL CALCULATED.3IONS-SCNC: 8 MMOL/L (ref 3–14)
AST SERPL W P-5'-P-CCNC: 123 U/L (ref 0–45)
BASOPHILS # BLD AUTO: 0 10E3/UL (ref 0–0.2)
BASOPHILS NFR BLD AUTO: 1 %
BILIRUB SERPL-MCNC: 1.3 MG/DL (ref 0.2–1.3)
BUN SERPL-MCNC: 21 MG/DL (ref 7–30)
CALCIUM SERPL-MCNC: 8 MG/DL (ref 8.5–10.1)
CHLORIDE BLD-SCNC: 105 MMOL/L (ref 94–109)
CK SERPL-CCNC: 53 U/L (ref 30–300)
CO2 SERPL-SCNC: 24 MMOL/L (ref 20–32)
CREAT SERPL-MCNC: 1.08 MG/DL (ref 0.66–1.25)
EOSINOPHIL # BLD AUTO: 0.5 10E3/UL (ref 0–0.7)
EOSINOPHIL NFR BLD AUTO: 7 %
ERYTHROCYTE [DISTWIDTH] IN BLOOD BY AUTOMATED COUNT: 13.8 % (ref 10–15)
GFR SERPL CREATININE-BSD FRML MDRD: 78 ML/MIN/1.73M2
GGT SERPL-CCNC: 232 U/L (ref 0–75)
GLUCOSE BLD-MCNC: 107 MG/DL (ref 70–99)
HCT VFR BLD AUTO: 24.8 % (ref 40–53)
HGB BLD-MCNC: 7.8 G/DL (ref 13.3–17.7)
HGB BLD-MCNC: 8 G/DL (ref 13.3–17.7)
HGB BLD-MCNC: 8 G/DL (ref 13.3–17.7)
HGB BLD-MCNC: 9 G/DL (ref 13.3–17.7)
IMM GRANULOCYTES # BLD: 0 10E3/UL
IMM GRANULOCYTES NFR BLD: 1 %
LYMPHOCYTES # BLD AUTO: 0.9 10E3/UL (ref 0.8–5.3)
LYMPHOCYTES NFR BLD AUTO: 13 %
MAGNESIUM SERPL-MCNC: 1.8 MG/DL (ref 1.6–2.3)
MCH RBC QN AUTO: 31.5 PG (ref 26.5–33)
MCHC RBC AUTO-ENTMCNC: 32 G/DL (ref 31.5–36.5)
MCV RBC AUTO: 98 FL (ref 78–100)
MONOCYTES # BLD AUTO: 1.1 10E3/UL (ref 0–1.3)
MONOCYTES NFR BLD AUTO: 17 %
NEUTROPHILS # BLD AUTO: 4 10E3/UL (ref 1.6–8.3)
NEUTROPHILS NFR BLD AUTO: 61 %
NRBC # BLD AUTO: 0 10E3/UL
NRBC BLD AUTO-RTO: 0 /100
PLATELET # BLD AUTO: 200 10E3/UL (ref 150–450)
POTASSIUM BLD-SCNC: 3.7 MMOL/L (ref 3.4–5.3)
PROT SERPL-MCNC: 6.8 G/DL (ref 6.8–8.8)
RBC # BLD AUTO: 2.54 10E6/UL (ref 4.4–5.9)
SODIUM SERPL-SCNC: 137 MMOL/L (ref 133–144)
WBC # BLD AUTO: 6.5 10E3/UL (ref 4–11)

## 2021-11-12 PROCEDURE — 250N000013 HC RX MED GY IP 250 OP 250 PS 637: Performed by: PHYSICIAN ASSISTANT

## 2021-11-12 PROCEDURE — 999N000157 HC STATISTIC RCP TIME EA 10 MIN

## 2021-11-12 PROCEDURE — 85025 COMPLETE CBC W/AUTO DIFF WBC: CPT | Performed by: INTERNAL MEDICINE

## 2021-11-12 PROCEDURE — 85004 AUTOMATED DIFF WBC COUNT: CPT | Performed by: HOSPITALIST

## 2021-11-12 PROCEDURE — 250N000013 HC RX MED GY IP 250 OP 250 PS 637: Performed by: HOSPITALIST

## 2021-11-12 PROCEDURE — 82550 ASSAY OF CK (CPK): CPT | Performed by: HOSPITALIST

## 2021-11-12 PROCEDURE — 93227 XTRNL ECG REC<48 HR R&I: CPT | Performed by: INTERNAL MEDICINE

## 2021-11-12 PROCEDURE — 82977 ASSAY OF GGT: CPT | Performed by: HOSPITALIST

## 2021-11-12 PROCEDURE — 93225 XTRNL ECG REC<48 HRS REC: CPT

## 2021-11-12 PROCEDURE — 36415 COLL VENOUS BLD VENIPUNCTURE: CPT | Performed by: INTERNAL MEDICINE

## 2021-11-12 PROCEDURE — 250N000011 HC RX IP 250 OP 636: Performed by: PHYSICIAN ASSISTANT

## 2021-11-12 PROCEDURE — 94660 CPAP INITIATION&MGMT: CPT

## 2021-11-12 PROCEDURE — 76705 ECHO EXAM OF ABDOMEN: CPT

## 2021-11-12 PROCEDURE — 99239 HOSP IP/OBS DSCHRG MGMT >30: CPT | Performed by: HOSPITALIST

## 2021-11-12 PROCEDURE — 80053 COMPREHEN METABOLIC PANEL: CPT | Performed by: HOSPITALIST

## 2021-11-12 PROCEDURE — 83735 ASSAY OF MAGNESIUM: CPT | Performed by: INTERNAL MEDICINE

## 2021-11-12 RX ORDER — ALLOPURINOL 300 MG/1
300 TABLET ORAL DAILY
Status: DISCONTINUED | OUTPATIENT
Start: 2021-11-12 | End: 2021-11-12 | Stop reason: HOSPADM

## 2021-11-12 RX ORDER — FUROSEMIDE 20 MG
20 TABLET ORAL DAILY
Qty: 30 TABLET | Refills: 0 | Status: SHIPPED | OUTPATIENT
Start: 2021-11-12 | End: 2021-11-22

## 2021-11-12 RX ORDER — PANTOPRAZOLE SODIUM 40 MG/1
40 TABLET, DELAYED RELEASE ORAL
Qty: 60 TABLET | Refills: 0 | Status: SHIPPED | OUTPATIENT
Start: 2021-11-12 | End: 2021-11-22

## 2021-11-12 RX ORDER — DILTIAZEM HYDROCHLORIDE 30 MG/1
30 TABLET, FILM COATED ORAL EVERY 6 HOURS
Qty: 60 TABLET | Refills: 0 | Status: SHIPPED | OUTPATIENT
Start: 2021-11-12 | End: 2021-11-12

## 2021-11-12 RX ADMIN — DILTIAZEM HYDROCHLORIDE 30 MG: 30 TABLET, FILM COATED ORAL at 09:45

## 2021-11-12 RX ADMIN — METOPROLOL SUCCINATE 150 MG: 50 TABLET, EXTENDED RELEASE ORAL at 09:45

## 2021-11-12 RX ADMIN — FUROSEMIDE 20 MG: 10 INJECTION, SOLUTION INTRAVENOUS at 09:45

## 2021-11-12 RX ADMIN — DILTIAZEM HYDROCHLORIDE 30 MG: 30 TABLET, FILM COATED ORAL at 15:56

## 2021-11-12 RX ADMIN — PANTOPRAZOLE SODIUM 40 MG: 40 TABLET, DELAYED RELEASE ORAL at 15:56

## 2021-11-12 RX ADMIN — ALLOPURINOL 300 MG: 300 TABLET ORAL at 11:56

## 2021-11-12 RX ADMIN — DILTIAZEM HYDROCHLORIDE 30 MG: 30 TABLET, FILM COATED ORAL at 04:38

## 2021-11-12 RX ADMIN — PANTOPRAZOLE SODIUM 40 MG: 40 TABLET, DELAYED RELEASE ORAL at 06:34

## 2021-11-12 ASSESSMENT — ACTIVITIES OF DAILY LIVING (ADL)
ADLS_ACUITY_SCORE: 7
ADLS_ACUITY_SCORE: 8
ADLS_ACUITY_SCORE: 8
ADLS_ACUITY_SCORE: 7
ADLS_ACUITY_SCORE: 7
ADLS_ACUITY_SCORE: 8
ADLS_ACUITY_SCORE: 7
ADLS_ACUITY_SCORE: 8
ADLS_ACUITY_SCORE: 7
ADLS_ACUITY_SCORE: 8
ADLS_ACUITY_SCORE: 7

## 2021-11-12 ASSESSMENT — MIFFLIN-ST. JEOR: SCORE: 1944.13

## 2021-11-12 NOTE — DISCHARGE SUMMARY
Discharge Note    Patient discharged to home via private vehicle  accompanied by no family/friend .  IV: Discontinued  Prescriptions e-prescribed to pharmacy.   Belongings reviewed and sent with Patient.  Home medications returned to patient: NA  Equipment sent with: patient, N/A.   patient verbalizes understanding of discharge instructions. AVS given to patient.

## 2021-11-12 NOTE — DISCHARGE SUMMARY
Discharge Summary  Hospitalist    Date of Admission:  11/10/2021  Date of Discharge:  11/12/2021  Discharging Provider: Meliza Mills MD    Primary Care Physician   Samy Marcelino  Primary Care Provider Phone Number: [phone]  Primary Care Provider Fax Number: [phone]    PRINCIPAL DIAGNOSIS  Acute GI bleed from gastric ulcer with adherent clot.   Acute anemia from blood loss.   Atrial fibrillation with RVR.  Nonsustained V. tach x1, asymptomatic.  Heart failure with preserved EF   Transaminitis likely from alcohol use, acute illness.  Hepatic steatosis likely from alcohol use, obesity.  Chronic moderate alcohol use.  Obesity with a BMI of 33.12.    Past Medical History:   Diagnosis Date     ASD (atrial septal defect)     s/p repair     Lumbar disc herniation 05/2018    better after steroid inj and PT       History of Present Illness   Tremaine King is an 51 year old male who presented with dark-colored stools and epigastric pain.    Hospital Course    Tremaine King is an 51 year old male with history of hypertension, obstructive sleep apnea on CPAP, mitral valve repair admitted on 11/10/2021 with dark-colored stools and epigastric pain.    Acute GI bleed from gastric ulcer with adherent clot.   Presented with dark-colored stool, coffee-ground emesis.  Reports using 2 tablets of ibuprofen 3 times a day, 2-3 alcoholic beverages a day.  Hemoglobin 8.0 on admission, hemoglobin on 10/6/2021 was at 12.1 with baseline around 14-15.  [facility] GI followed, 11/10 EGD noted gastric ulcer with adherent clot. Treated with bipolar cautery and injected.  On IV PPI, switch to oral PPI twice daily on discharge.  Repeat EGD in 8 weeks to check for healing.  Discussed with gastroenterology MOE, hold blood thinner medications for 1 week from EGD.  Avoid NSAIDs, alcohol use.    Acute anemia from blood loss.   Hemoglobin 8.0 on admission, hemoglobin on 10/6/2021 was at 12.1 with baseline around 14-15.  Hemoglobin dropped  to 6.6, received blood transfusion and stable around 8.  Monitor hemoglobin level in 5 days or earlier if symptomatic.    Atrial fibrillation with RVR.  Nonsustained V. tach x1, asymptomatic.  Heart failure with preserved EF  History of ASD repair at age 3, mitral valve repair in 2005.  Hypertension.  Patient reported no history of atrial flutter or fibrillation,although his echo reports have reported AF in the past.  PTA not on blood thinners.  In ED EKG with A. fib with RVR, incomplete RBBB.  Troponin x1 undetectable, proBNP 1323.  Echocardiogram 11/3 with estimated EF of 55 to 60%.  Global right ventricular function mildly reduced.  Followed by cardiology, recommended to continue Cardizem  mg daily.   PTA Toprol- mg oral daily continued at discharge.  Was on IV Lasix for diuresis, switch to 20 mg orally daily on discharge, monitor BMP in 5 days.  Discontinued prior to admission amlodipine, lisinopril during hospitalization.  GI recommends holding off blood thinner medications for 1 week from EGD.  Cardiology recommended 48-hour Holter monitor on discharge, consider outpatient ischemic evaluation if recurrent nonsustained V. tach.  Patient has follow-up with his primary cardiologist Dr Gotti next week, consider anticoagulation and cardiology visit.  Monitor home blood pressure, heart rate, weights and review on provider visit and optimize therapy.    Transaminitis likely from alcohol use, acute illness.  , ALT 91.  Ultrasound with gallbladder wall thickening, no gallstones.  Follow-up as outpatient if symptomatic.  Follow liver function tests in 5 days.    Hepatic steatosis likely from alcohol use, obesity.  Ultrasound with hepatic steatosis, encouraged to consider abstinence from alcohol, weight loss.    Chronic moderate alcohol use.  Reports drinks 2-3 drinks a day, has not been drinking for a week prior to admission.  Emphasized need for abstinence from alcohol.    Obstructive sleep  apnea.  Continue PTA CPAP, follow-up with a sleep provider as outpatient.    Obesity with a BMI of 33.12.  Consider lifestyle modification with diet and exercise as able to.    Meliza Mills MD.    Pending Results   These results will be followed up by ordering provider   Unresulted Labs Ordered in the Past 30 Days of this Admission     Date and Time Order Name Status Description    11/12/2021  1:10 PM GGT In process     11/12/2021  1:09 PM CK total In process              Physical Exam   Vitals:    11/10/21 0814 11/11/21 0500 11/12/21 0500   Weight: 120.7 kg (266 lb) 110 kg (242 lb 6.4 oz) 107.7 kg (237 lb 7 oz)     Vital Signs with Ranges  Temp:  [96.8  F (36  C)-99.3  F (37.4  C)] 99.3  F (37.4  C)  Pulse:  [70-80] 71  Resp:  [16-18] 18  BP: ()/(47-55) 106/55  SpO2:  [95 %-99 %] 99 %  I/O last 3 completed shifts:  In: 240 [P.O.:240]  Out: -   PHYSICAL EXAM  GENERAL: Patient is in no distress. Alert and oriented.  HEART: irregular rate and rhythm. S1S2.  LUNGS: Clear to auscultation bilaterally. No expiratory wheeze.  Respirations unlabored  ABDOMEN: Soft, no abdominal tenderness, bowel sounds heard  No Right upper quadrant tenderness.  NEURO: Moving all extremities.  EXTREMITIES: No pedal edema. 2+ peripheral pulses.  SKIN: Warm, dry. No rash or bruising.  PSYCHIATRY Cooperative  )Consultations This Hospital Stay   GASTROENTEROLOGY IP CONSULT  CARDIOLOGY IP CONSULT    Time Spent on this Encounter   I, Meliza Mills MD, personally saw the patient today and spent greater than 30 minutes discharging this patient.. Discussed with patient, bedside RN, gastroenterology MOE.    Discharge Disposition   Discharged to home  Condition at discharge: Stable    Discharge Orders      Reason for your hospital stay     Follow-up and recommended labs and tests     Follow up with primary care provider, Samy Marcelino, within 5 days for hospital follow- up.  The following labs/tests are recommended: Hemoglobin,  BMP, liver function test, magnesium in 5 days or earlier if symptomatic.  Follow-up with primary cardiologist per schedule next week.  Follow-up with [facility] for EGD in 8 weeks.  Per GI hold anticoagulation for a week from EGD.  Defer starting anticoagulation to patient's primary cardiologist next week.    Holter monitoring on discharge per cardiology, ischemic evaluation should be considered if he has recurrent NSVT.     Activity    Your activity upon discharge: activity as tolerated     Discharge Instructions    Strongly consider abstinence from alcohol, consider rehab as outpatient if needed.  Avoid NSAIDs.  Consider lifestyle modification with diet and exercise as able to.  Consider weight loss.    Presented with gallbladder wall thickening, follow-up with provider if any abdominal pain, nausea vomiting.     Monitor and record    Monitor daily blood pressures,heart rate, weights and review on provider visit.     Holter Monitor 48 hour Adult Pediatric     Diet    Low-fat, low-salt diet.       Discharge Medications   Current Discharge Medication List      START taking these medications    Details   diltiazem ER COATED BEADS (CARDIAZEM LA) 180 MG 24 hr tablet Take 1 tablet (180 mg) by mouth daily  Qty: 30 tablet, Refills: 0    Comments: Please disregard earlier prescription for immediate release Cardizem.  Associated Diagnoses: Atrial fibrillation, unspecified type (H)      furosemide (LASIX) 20 MG tablet Take 1 tablet (20 mg) by mouth daily  Qty: 30 tablet, Refills: 0    Associated Diagnoses: Atrial fibrillation, unspecified type (H)      pantoprazole (PROTONIX) 40 MG EC tablet Take 1 tablet (40 mg) by mouth 2 times daily (before meals)  Qty: 60 tablet, Refills: 0    Associated Diagnoses: Gastrointestinal hemorrhage, unspecified gastrointestinal hemorrhage type         CONTINUE these medications which have NOT CHANGED    Details   albuterol (PROAIR HFA/PROVENTIL HFA/VENTOLIN HFA) 108 (90 Base) MCG/ACT inhaler  Inhale 2 puffs into the lungs every 6 hours as needed for shortness of breath / dyspnea  Qty: 8.5 g, Refills: 3    Associated Diagnoses: Mild persistent asthma without complication      allopurinol (ZYLOPRIM) 300 MG tablet Take 1 tablet (300 mg) by mouth daily  Qty: 90 tablet, Refills: 3    Associated Diagnoses: Idiopathic gout, unspecified chronicity, unspecified site      amoxicillin (AMOXIL) 500 MG capsule Please take a total of 2 grams (four 500 mg capsules) 30-60 minutes prior to dental procedure.  Qty: 12 capsule, Refills: 3    Associated Diagnoses: History of repair of mitral valve      beclomethasone HFA (QVAR REDIHALER) 80 MCG/ACT inhaler Inhale 1 puff into the lungs 2 times daily  Qty: 10.6 g, Refills: 11    Associated Diagnoses: Mild persistent asthma without complication      metoprolol succinate ER (TOPROL-XL) 100 MG 24 hr tablet TAKE 1 AND 1/2 TABLETS (150MG)  BY MOUTH ONE TIME DAILY.  Qty: 135 tablet, Refills: 3    Associated Diagnoses: Essential hypertension with goal blood pressure less than 140/90      sildenafil (REVATIO) 20 MG tablet Take 1 tablet (20 mg) by mouth daily as needed (erectile dysfunction)  Qty: 12 tablet, Refills: 1    Associated Diagnoses: Erectile dysfunction due to arterial insufficiency      AFLURIA QUADRIVALENT injection Inject as directed.  To be administered by a pharmacist.      order for DME Equipment being ordered: CPAP  Patient Tremaine King was set up at [place] on September 14, 2015. Patient received a [employer] DreamStation Auto CPAP Auto. Pressures were set at Auto 12 - 12 cm H2O.   Patient s ramp is 9 cm H2O for Auto and FLEX/EPR is A Flex.  Patient received a [employer] Mask name: BLUE GEL NASAL  Nasal mask Size Medium, heated tubing and heated humidifier.  Patient is not enrolled in the STM Program and does not need to meet compliance. Samnia Arora         STOP taking these medications       amLODIPine (NORVASC) 10 MG tablet Comments:    Reason for Stopping:         ibuprofen (ADVIL/MOTRIN) 200 MG tablet Comments:   Reason for Stopping:         lisinopril (ZESTRIL) 40 MG tablet Comments:   Reason for Stopping:             Allergies   Allergies   Allergen Reactions     Hctz [Hydrochlorothiazide]      Hypokalemia       Spironolactone      hyponatremia       DATA  Most Recent 3 CBC's:Recent Labs   Lab Test 11/12/21  1249 11/12/21  0633 11/11/21  2349 11/11/21  1209 11/11/21  0439 11/10/21  1433 11/10/21  0853   WBC  --  6.5  --   --  7.0  --  10.3   HGB 9.0* 8.0*  8.0* 7.8*   < > 7.6*   < > 8.0*   MCV  --  98  --   --  98  --  96   PLT  --  200  --   --  125*  --  202    < > = values in this interval not displayed.      Most Recent 3 BMP's:  Recent Labs   Lab Test 11/12/21  0633 11/11/21  0439 11/10/21  0853    138 141   POTASSIUM 3.7 3.6 3.8   CHLORIDE 105 106 108   CO2 24 26 24   BUN 21 44* 57*   CR 1.08 1.27* 1.06   ANIONGAP 8 6 9   ESPERANZA 8.0* 7.9* 8.6   * 99 129*     Most Recent 2 LFT's:  Recent Labs   Lab Test 11/12/21  0633 11/10/21  0853   * 39   ALT 91* 40   ALKPHOS 85 97   BILITOTAL 1.3 1.2     Most Recent INR's and Anticoagulation Dosing History:  Anticoagulation Dose History     Recent Dosing and Labs Latest Ref Rng & Units 6/13/2005 6/23/2005 6/24/2005 9/7/2005 11/10/2021    INR 0.85 - 1.15 1.13 1.48(H) 1.28(H) 1.15(H) 1.34(H)        Most Recent 3 Troponin's:  Recent Labs   Lab Test 11/10/21  0853 02/09/14  1745   TROPI  --  <0.012   TROPONIN <0.015  --      Most Recent Cholesterol Panel:  Recent Labs   Lab Test 10/06/21  0907   CHOL 147   LDL 91   HDL 44   TRIG 58     Most Recent TSH, T4 and A1c Labs:  Recent Labs   Lab Test 10/06/21  0907   A1C 5.4     Results for orders placed or performed during the hospital encounter of 11/10/21   XR Chest 2 Views    Narrative    EXAM: XR CHEST 2 VW  LOCATION: [facility]  DATE/TIME: 11/11/2021 5:15 PM    INDICATION: SOB  COMPARISON: 6/11/2013       Impression    IMPRESSION: Prior sternotomy. Enlarged cardiac silhouette. No pulmonary edema, or pneumonia. Small right pleural effusion.   US Abdomen Limited    Narrative    US ABDOMEN LIMITED 11/12/2021 2:22 PM    CLINICAL HISTORY: Transaminitis s/p GI bleed, history of alcohol use,  Transaminitis s/p GI bleed, history of alcohol use  TECHNIQUE: Limited abdominal ultrasound.    COMPARISON: None.    FINDINGS:    GALLBLADDER: Partially contracted with borderline wall thickening. No  shadowing gallstones. Negative sonographic Mathur sign.    BILE DUCTS: There is no biliary dilatation. The common duct measures 2  mm.    LIVER: Diffusely coarsened echotexture. Normal size and surface  contour. No discrete focal lesion.    RIGHT KIDNEY: No hydronephrosis.    PANCREAS: Completely obscured by overlying bowel gas.    A small right-sided pleural effusion is noted.    No ascites.      Impression    IMPRESSION:  1.  Diffusely coarsened hepatic echotexture likely related to hepatic  steatosis. No focal liver lesion.  2.  Borderline gallbladder wall thickening likely related to partially  contracted state. No cholelithiasis or biliary ductal dilatation.  3.  Small right-sided pleural effusion.    RAUL GRANADOS MD         SYSTEM ID:  [id number]

## 2021-11-12 NOTE — PROGRESS NOTES
11/12/21 [id number]   Primary Diagnosis: GI bleed  Orientation:  A and O X4  Aggression Stop Light: Green  Mobility: SBA  Pain Management: 0/10  Diet: mechanical soft  Bowel/Bladder:  WDL  Abnormal Lab/Assessments: HGB 8.0, ALT 91, . STAT US  Drain/Device/Wound: PIV  Consults: GI, Cardiology  D/C Day/Goals/Place: today pending US results.       Shift Note:     11/12/2021 [id number]     A/O x4. VSS on RA. No complaints of SOB on shift. US of liver today. Denies pain. Up ind. R PIV SL. Plan to discharge home today 11/12.

## 2021-11-12 NOTE — PLAN OF CARE
11/11/2021 [id number]    A/O x4. VSS on RA. No complaints of SOB on shift. Chest xray 11/11, see results. CPAP overnight. Denies pain and n/v. Up ind. R PIV SL. Plan to discharge home today 11/12.

## 2021-11-12 NOTE — PLAN OF CARE
A&Ox4, VSS on RA ex soft BP. Denies pain and nausea. Up ind in room. Hgb 8.4. Having some HANNON, CXR done today, see results. Plan to likely discharge home tomorrow.

## 2021-11-16 ENCOUNTER — TELEPHONE (OUTPATIENT)
Dept: CARDIOLOGY | Facility: CLINIC | Age: 51
End: 2021-11-16
Payer: COMMERCIAL

## 2021-11-16 ENCOUNTER — TELEPHONE (OUTPATIENT)
Dept: SLEEP MEDICINE | Facility: CLINIC | Age: 51
End: 2021-11-16
Payer: COMMERCIAL

## 2021-11-16 DIAGNOSIS — G47.33 OSA ON CPAP: Primary | ICD-10-CM

## 2021-11-16 NOTE — TELEPHONE ENCOUNTER
Reason for Call:  Other call back    Detailed comments: patient has an appt scheduled but would like to get a new mask and hosing prior to the appointment    Phone Number Patient can be reached at: Home number on file [phone] (home)    Best Time: anytime    Can we leave a detailed message on this number? YES    Call taken on 11/16/2021 at 12:37 PM by Nicole Tyler

## 2021-11-16 NOTE — TELEPHONE ENCOUNTER
holter monitor 11/16/2021 noted. Ordered at discharge for history of atrial fibrillation and 1 run NSVT.    Patient to see Dr. Gotti on 11/17/2021.    48 hr holter: atrial fibrillation with RBBB. Average heart rate 63 BPM, range  BPM. 23 VEB with 19 isolated PVCs and 2 couplets. 38 pauses R-R >2.0 sec. Maximum R-R=2.28 sec.    Will message Dr. Lopez to review

## 2021-11-17 ENCOUNTER — VIRTUAL VISIT (OUTPATIENT)
Dept: CARDIOLOGY | Facility: CLINIC | Age: 51
End: 2021-11-17
Attending: INTERNAL MEDICINE
Payer: COMMERCIAL

## 2021-11-17 DIAGNOSIS — I10 HYPERTENSION, UNSPECIFIED TYPE: Primary | ICD-10-CM

## 2021-11-17 DIAGNOSIS — I48.19 PERSISTENT ATRIAL FIBRILLATION (H): ICD-10-CM

## 2021-11-17 DIAGNOSIS — Z98.890 S/P MVR (MITRAL VALVE REPAIR): ICD-10-CM

## 2021-11-17 DIAGNOSIS — K25.4 GASTROINTESTINAL HEMORRHAGE ASSOCIATED WITH GASTRIC ULCER: ICD-10-CM

## 2021-11-17 PROCEDURE — 99215 OFFICE O/P EST HI 40 MIN: CPT | Mod: 95 | Performed by: INTERNAL MEDICINE

## 2021-11-17 NOTE — PATIENT INSTRUCTIONS
Virtual video visit  Feb 16 2022    Please schedule a lab appointment for a blood draw to occur within 2 weeks prior to this date.     Scheduling : [phone]

## 2021-11-17 NOTE — PROGRESS NOTES
Service Date: 2021    Samy Marcelino MD   [place] [id number]    RE:      Tremaine King  MRN:  [id number]  :   1970    Dear Dr. Marcelino:    It was a pleasure participating in the care of your patient Mr. Tremaine King.  As you know, he is a 51-year-old gentleman whom I saw today over a virtual video visit via [employer] for recent hospitalization for GI bleed, atrial fibrillation, mitral valve repair and hypertension.    PAST MEDICAL HISTORY:      1.  Migraine headaches.  2.  Gout.  3.  Seasonal allergies with verbal history of mild asthma.  4.  Meniscus tear of the knee.  5.  History of morbid obesity with significant weight loss due to diet and exercise.  6.  Sleep apnea, treated with CPAP in .  7.  History of recent GI bleed.    He was doing fine until 11/10/2021 when he presented with coffee-ground emesis and then black, tarry stool.  His hemoglobin reached a low of 6.6, and he had an EGD that showed a 10 mm deep gastric ulcer that was treated with cautery and injection therapy.    The ulcer was thought secondary to ibuprofen 600 mg a day along with alcohol use.    Concomitantly, the patient was found to be in persistent atrial fibrillation with a CHADS-VASc2 score of 1.  Inpatient Cardiology consultation recommended rate control therapy and following up here to discuss anticoagulation in the future.    Additionally, upon admission, the patient was dealing with some edema in his ankles and calves and thighs.  He was diuresed as an inpatient with IV Lasix, which made him feel better, but the swelling has not completely resolved yet, and he still has some marks on his ankles from his shoes.    Since being home, he has not exercised at all, but just carries out his activities of daily living and denies any gross chest pain or shortness of breath.  He denies other PND or orthopnea.  He does have some trace edema.  He denies other palpitations,  syncope or near syncope.    It was noted as an inpatient that he had a 39 beat run of ventricular tachycardia, and ischemic evaluation was recommended if his Holter monitor showed any further arrhythmias, which did not appear on his Holter monitor on 11/12/2021.    He otherwise has no other complaints.    REVIEW OF SYSTEMS:  A 10 point review of systems is positive for quitting alcohol and NSAID use and no pertinent negatives for further GI bleeds, coffee-ground emesis, black or bloody stools     PRESENT MEDICATIONS:      1.  He is holding aspirin for now.  2.  Diltiazem 180 mg a day.   3.  Lasix 20 mg a day.   4.  Metoprolol succinate 150 mg a day.  5.  Omeprazole 20 mg twice daily.  6.  Sildenafil.    PHYSICAL EXAMINATION:      VITAL SIGNS:  His blood pressure is 128/78 with a pulse of 74.  His weight prior to admission was 260 pounds.  He is currently 230 pounds.  GENERAL:  He appears comfortable, well groomed.  PSYCHIATRIC:  He is alert and oriented x3.  HEENT:  His eyes do not appear grossly erythematous or have exudate.  RESPIRATORY:  He is breathing comfortably without gross cough.    The remainder of the comprehensive physical exam was deferred secondary to the COVID-19 pandemic and secondary to video visit restrictions.    LABORATORY:  On 10/06/2021, LDL 91.  On 11/12/2021, potassium 3.7, GFR normal, hemoglobin 9.0. A 48 hour Holter monitor 11/12/2021 reveals atrial fibrillation with predominant rhythm at a rate of 63 beats per minute.  No significant ventricular tachycardia noted.  No symptoms reported.    Echocardiogram 11/03/2021 reveals an ejection fraction of 55%-60%.  Mean mitral valve gradient 8 mmHg, mild to moderate MR.  Predominant rhythm was AFib, rate of 72 beats per minute.      IMPRESSION:      Tremaine is a 51-year-old gentleman with several active issues:    1.  Persistent atrial fibrillation in the context of a recent severe GI bleed.    His GI bleed recently was thought to be secondary to  ibuprofen use 600 mg a day along with alcohol and was treated with cautery and injection therapy.  He has since stopped drinking and taking NSAIDs.  However, he is due for a repeat EGD in approximately 8 weeks.    His CHADS-VASc2 score is currently 1 for hypertension only, and his risk for bleeding is significantly higher than 1% per year.    We did discuss full anticoagulation today, and the patient would like to research some of the direct oral anticoagulants, specifically Eliquis at this time, as clearly his risk for serious life-threatening bleeding is definitely higher than his risk for stroke at the present time if one were to consider the risk/benefit ratio.  He would like some time to do his own research and think it over for the time being until further information can be gathered and further discussion can ensue.    2.  Mitral valve repair.    His mean mitral valve gradient has been elevated over the years, but his last echo 11/03/2021 reveals a mean gradient of 8 across the mitral valve with mild to moderate mitral insufficiency.  We will continue to monitor.    3.  Hypertension, relatively well controlled.      Blood pressures have been well controlled in the mid 120s.  Continue to follow.    4.  Ventricular tachycardia.    When the patient was an inpatient, a 39 beat run of VT was noted, and an ischemic evaluation was recommended if further VT was noted.    Fortunately, the 48 hour Holter monitor 11/12/2021 did not reveal further episodes.  The patient is currently asymptomatic at a low to at most moderate level of exertion.  We will continue to monitor clinically for now.    5.  Lower extremity edema and weight gain.    He was 260 pounds prior to admission and lost 30 pounds with aggressive diuresis as an inpatient and continued oral diuresis as an outpatient.    He still has some residual ankle edema where his shoes make a gustavo on his ankles and likely has not found his goal dry weight as of yet.  We  will continue to optimize therapy.      PLAN:      1.  We had a long discussion today regarding full anticoagulation.  His risk for serious life-threatening bleeding is clearly higher than that of his current risk for stroke, which would be approximately 1% per year, according to his CHADS-VASc2 score. He would like to do the following:    a.  Research Eliquis and talk it over with his wife.  b.  He would like to schedule his followup EGD in approximately 8 weeks, and pending the results of this test, he would like to rediscuss possibly starting a direct oral anticoagulant subsequently if the exam should indicate that he is safe to be fully anticoagulated at that time.    He understands that he does have a risk for stroke currently while being in atrial fibrillation, and that would approximately be in the order of 1% per year, at least for the moment.      Virtual video visit followup  with labs prior, earlier if needed.      2.  Continue Lasix orally in order to find a dry weight from a fluid balance standpoint.      Once he finds a target dry weight, then he will only take Lasix on an as-needed basis to achieve his dry weight and stop it if his weight goes below this weight and only take Lasix if his weight should go up 3 or more pounds above that target weight.      He will obviously call for assistance or present to the emergency room if he should have serious swelling or other more urgent questions or needs.    Once again, it was a pleasure participating in the care of your patient Mr. Tremaine Horn.  Please feel free to contact me at any time if there are any questions regarding his care in the future.     Sincerely,        Tim Gotti MD        D: 2021   T: 2021   MT: ericka    Name:     TREMAINE HORN  MRN:      [id number]        Account:      [id number]   :      1970           Service Date: 2021       Document: [id number]

## 2021-11-17 NOTE — LETTER
2021      RE: Tremaine King  [address]  [facility] [address]       Dear Colleague,    Thank you for the opportunity to participate in the care of your patient, Tremaine King, at the [facility] [place] [facility]. Please see a copy of my visit note below.      Service Date: 2021    Samy Marcelino MD   [address] [address]    RE:      Tremaine King  MRN:  [id number]  :   1970    Dear Dr. Marcelino:    It was a pleasure participating in the care of your patient Mr. Tremaine King.  As you know, he is a 51-year-old gentleman whom I saw today over a virtual video visit via [employer] for recent hospitalization for GI bleed, atrial fibrillation, mitral valve repair and hypertension.    PAST MEDICAL HISTORY:      1.  Migraine headaches.  2.  Gout.  3.  Seasonal allergies with verbal history of mild asthma.  4.  Meniscus tear of the knee.  5.  History of morbid obesity with significant weight loss due to diet and exercise.  6.  Sleep apnea, treated with CPAP in .  7.  History of recent GI bleed.    He was doing fine until 11/10/2021 when he presented with coffee-ground emesis and then black, tarry stool.  His hemoglobin reached a low of 6.6, and he had an EGD that showed a 10 mm deep gastric ulcer that was treated with cautery and injection therapy.    The ulcer was thought secondary to ibuprofen 600 mg a day along with alcohol use.    Concomitantly, the patient was found to be in persistent atrial fibrillation with a CHADS-VASc2 score of 1.  Inpatient Cardiology consultation recommended rate control therapy and following up here to discuss anticoagulation in the future.    Additionally, upon admission, the patient was dealing with some edema in his ankles and calves and thighs.  He was diuresed as an inpatient with IV Lasix, which made him feel better, but the swelling has not  completely resolved yet, and he still has some marks on his ankles from his shoes.    Since being home, he has not exercised at all, but just carries out his activities of daily living and denies any gross chest pain or shortness of breath.  He denies other PND or orthopnea.  He does have some trace edema.  He denies other palpitations, syncope or near syncope.    It was noted as an inpatient that he had a 39 beat run of ventricular tachycardia, and ischemic evaluation was recommended if his Holter monitor showed any further arrhythmias, which did not appear on his Holter monitor on 11/12/2021.    He otherwise has no other complaints.    REVIEW OF SYSTEMS:  A 10 point review of systems is positive for quitting alcohol and NSAID use and no pertinent negatives for further GI bleeds, coffee-ground emesis, black or bloody stools     PRESENT MEDICATIONS:      1.  He is holding aspirin for now.  2.  Diltiazem 180 mg a day.   3.  Lasix 20 mg a day.   4.  Metoprolol succinate 150 mg a day.  5.  Omeprazole 20 mg twice daily.  6.  Sildenafil.    PHYSICAL EXAMINATION:      VITAL SIGNS:  His blood pressure is 128/78 with a pulse of 74.  His weight prior to admission was 260 pounds.  He is currently 230 pounds.  GENERAL:  He appears comfortable, well groomed.  PSYCHIATRIC:  He is alert and oriented x3.  HEENT:  His eyes do not appear grossly erythematous or have exudate.  RESPIRATORY:  He is breathing comfortably without gross cough.    The remainder of the comprehensive physical exam was deferred secondary to the COVID-19 pandemic and secondary to video visit restrictions.    LABORATORY:  On 10/06/2021, LDL 91.  On 11/12/2021, potassium 3.7, GFR normal, hemoglobin 9.0. A 48 hour Holter monitor 11/12/2021 reveals atrial fibrillation with predominant rhythm at a rate of 63 beats per minute.  No significant ventricular tachycardia noted.  No symptoms reported.    Echocardiogram 11/03/2021 reveals an ejection fraction of 55%-60%.   Mean mitral valve gradient 8 mmHg, mild to moderate MR.  Predominant rhythm was AFib, rate of 72 beats per minute.      IMPRESSION:      Tremaine is a 51-year-old gentleman with several active issues:    1.  Persistent atrial fibrillation in the context of a recent severe GI bleed.    His GI bleed recently was thought to be secondary to ibuprofen use 600 mg a day along with alcohol and was treated with cautery and injection therapy.  He has since stopped drinking and taking NSAIDs.  However, he is due for a repeat EGD in approximately 8 weeks.    His CHADS-VASc2 score is currently 1 for hypertension only, and his risk for bleeding is significantly higher than 1% per year.    We did discuss full anticoagulation today, and the patient would like to research some of the direct oral anticoagulants, specifically Eliquis at this time, as clearly his risk for serious life-threatening bleeding is definitely higher than his risk for stroke at the present time if one were to consider the risk/benefit ratio.  He would like some time to do his own research and think it over for the time being until further information can be gathered and further discussion can ensue.    2.  Mitral valve repair.    His mean mitral valve gradient has been elevated over the years, but his last echo 11/03/2021 reveals a mean gradient of 8 across the mitral valve with mild to moderate mitral insufficiency.  We will continue to monitor.    3.  Hypertension, relatively well controlled.      Blood pressures have been well controlled in the mid 120s.  Continue to follow.    4.  Ventricular tachycardia.    When the patient was an inpatient, a 39 beat run of VT was noted, and an ischemic evaluation was recommended if further VT was noted.    Fortunately, the 48 hour Holter monitor 11/12/2021 did not reveal further episodes.  The patient is currently asymptomatic at a low to at most moderate level of exertion.  We will continue to monitor clinically for  now.    5.  Lower extremity edema and weight gain.    He was 260 pounds prior to admission and lost 30 pounds with aggressive diuresis as an inpatient and continued oral diuresis as an outpatient.    He still has some residual ankle edema where his shoes make a gustavo on his ankles and likely has not found his goal dry weight as of yet.  We will continue to optimize therapy.      PLAN:      1.  We had a long discussion today regarding full anticoagulation.  His risk for serious life-threatening bleeding is clearly higher than that of his current risk for stroke, which would be approximately 1% per year, according to his CHADS-VASc2 score. He would like to do the following:    a.  Research Eliquis and talk it over with his wife.  b.  He would like to schedule his followup EGD in approximately 8 weeks, and pending the results of this test, he would like to rediscuss possibly starting a direct oral anticoagulant subsequently if the exam should indicate that he is safe to be fully anticoagulated at that time.    He understands that he does have a risk for stroke currently while being in atrial fibrillation, and that would approximately be in the order of 1% per year, at least for the moment.      Virtual video visit followup 02/16 with labs prior, earlier if needed.      2.  Continue Lasix orally in order to find a dry weight from a fluid balance standpoint.      Once he finds a target dry weight, then he will only take Lasix on an as-needed basis to achieve his dry weight and stop it if his weight goes below this weight and only take Lasix if his weight should go up 3 or more pounds above that target weight.      He will obviously call for assistance or present to the emergency room if he should have serious swelling or other more urgent questions or needs.    Once again, it was a pleasure participating in the care of your patient Mr. Tremaine King.  Please feel free to contact me at any time if there are any questions  regarding his care in the future.     Sincerely,        Tim Gotti MD        D: 2021   T: 2021   MT: ericka    Name:     BROOKE HORN  MRN:      [id number]        Account:      [id number]   :      1970           Service Date: 2021       Document: [id number]

## 2021-11-17 NOTE — TELEPHONE ENCOUNTER
Patient had a visit today (dictation pending) but patient instructions indicate plan for f/up in Feb 2022 w/ labs prior. Message to Dr Lopez to confirm deferral of ongoing management to Dr Gotti.

## 2021-11-17 NOTE — PROGRESS NOTES
"Tremaine King is a 51 year old who is being evaluated via a billable video visit.      How would you like to obtain your AVS? MyChart  If the video visit is dropped, the invitation should be resent by: Text to cell phone: [phone]  Will anyone else be joining your video visit? No      Vitals - Patient Reported  Weight (Patient Reported): 103.9 kg (229 lb)  Pain Score: No Pain (0) (No SOB)    Vitals were taken and medications reconciled.    Leonel Camarillo, EMT  7:15 AM    The patient has been notified of following:     \"This video visit will be conducted via a call between you and your physician/provider. We have found that certain health care needs can be provided without the need for an in-person physical exam.  This service lets us provide the care you need with a video conversation.  If a prescription is necessary we can send it directly to your pharmacy.  If lab work is needed we can place an order for that and you can then stop by our lab to have the test done at a later time.    Video visits are billed at different rates depending on your insurance coverage.  Please reach out to your insurance provider with any questions.    If during the course of the call the physician/provider feels a video visit is not appropriate, you will not be charged for this service.\"    Patient has given verbal consent for video visit? Yes    How would you like to obtain your AVS? Mail    Video-Visit Details    Type of service:  Video Visit    Video Start Time:747am    Video End Time:813am    Total visit time including video visit, chart review, charting, coordination of care =56min    Originating Location (pt. Location):patient home      Distant Location (provider location):  home office    Platform used for Video Visit: [employer]    See dictation #[id number]    CSN#:[id number]  "

## 2021-11-19 ENCOUNTER — LAB (OUTPATIENT)
Dept: LAB | Facility: CLINIC | Age: 51
End: 2021-11-19
Payer: COMMERCIAL

## 2021-11-19 DIAGNOSIS — I10 HYPERTENSION, UNSPECIFIED TYPE: ICD-10-CM

## 2021-11-19 DIAGNOSIS — Z13.6 CARDIOVASCULAR SCREENING; LDL GOAL LESS THAN 130: ICD-10-CM

## 2021-11-19 DIAGNOSIS — K25.4 GASTROINTESTINAL HEMORRHAGE ASSOCIATED WITH GASTRIC ULCER: ICD-10-CM

## 2021-11-19 DIAGNOSIS — R89.9 ABNORMAL LABORATORY TEST: ICD-10-CM

## 2021-11-19 LAB
ERYTHROCYTE [DISTWIDTH] IN BLOOD BY AUTOMATED COUNT: 13 % (ref 10–15)
HCT VFR BLD AUTO: 31.2 % (ref 40–53)
HGB BLD-MCNC: 10.4 G/DL (ref 13.3–17.7)
MCH RBC QN AUTO: 32.6 PG (ref 26.5–33)
MCHC RBC AUTO-ENTMCNC: 33.3 G/DL (ref 31.5–36.5)
MCV RBC AUTO: 98 FL (ref 78–100)
PLATELET # BLD AUTO: 352 10E3/UL (ref 150–450)
RBC # BLD AUTO: 3.19 10E6/UL (ref 4.4–5.9)
WBC # BLD AUTO: 9.6 10E3/UL (ref 4–11)

## 2021-11-19 PROCEDURE — 84550 ASSAY OF BLOOD/URIC ACID: CPT

## 2021-11-19 PROCEDURE — 36415 COLL VENOUS BLD VENIPUNCTURE: CPT

## 2021-11-19 PROCEDURE — 80053 COMPREHEN METABOLIC PANEL: CPT

## 2021-11-19 PROCEDURE — 80061 LIPID PANEL: CPT

## 2021-11-19 PROCEDURE — 85027 COMPLETE CBC AUTOMATED: CPT

## 2021-11-19 NOTE — TELEPHONE ENCOUNTER
Reason for call:  Other   Patient called regarding (reason for call): prescription  Additional comments: following up on status of request for a script for mask before next appt in January     Phone number to reach patient:  Cell number on file:    Telephone Information:   Mobile [phone]       Best Time:  any    Can we leave a detailed message on this number?  YES    Travel screening: Not Applicable

## 2021-11-19 NOTE — TELEPHONE ENCOUNTER
PATIENT WAS INFORMED DME ORDERS ARE PENDING REVIEW, MESSAGE WILL BE SENT TO DR. MINER TO REVIEW.    Mary Alice Molina MA

## 2021-11-20 LAB
ALBUMIN SERPL-MCNC: 3.8 G/DL (ref 3.4–5)
ALP SERPL-CCNC: 122 U/L (ref 40–150)
ALT SERPL W P-5'-P-CCNC: 101 U/L (ref 0–70)
ANION GAP SERPL CALCULATED.3IONS-SCNC: 7 MMOL/L (ref 3–14)
AST SERPL W P-5'-P-CCNC: 90 U/L (ref 0–45)
BILIRUB SERPL-MCNC: 1.4 MG/DL (ref 0.2–1.3)
BUN SERPL-MCNC: 12 MG/DL (ref 7–30)
CALCIUM SERPL-MCNC: 9.7 MG/DL (ref 8.5–10.1)
CHLORIDE BLD-SCNC: 99 MMOL/L (ref 94–109)
CHOLEST SERPL-MCNC: 167 MG/DL
CO2 SERPL-SCNC: 29 MMOL/L (ref 20–32)
CREAT SERPL-MCNC: 1.02 MG/DL (ref 0.66–1.25)
FASTING STATUS PATIENT QL REPORTED: YES
GFR SERPL CREATININE-BSD FRML MDRD: 84 ML/MIN/1.73M2
GLUCOSE BLD-MCNC: 104 MG/DL (ref 70–99)
HDLC SERPL-MCNC: 49 MG/DL
LDLC SERPL CALC-MCNC: 104 MG/DL
NONHDLC SERPL-MCNC: 118 MG/DL
POTASSIUM BLD-SCNC: 3.7 MMOL/L (ref 3.4–5.3)
PROT SERPL-MCNC: 8.3 G/DL (ref 6.8–8.8)
SODIUM SERPL-SCNC: 135 MMOL/L (ref 133–144)
TRIGL SERPL-MCNC: 72 MG/DL
URATE SERPL-MCNC: 6.3 MG/DL (ref 3.5–7.2)

## 2021-11-22 ENCOUNTER — VIRTUAL VISIT (OUTPATIENT)
Dept: FAMILY MEDICINE | Facility: CLINIC | Age: 51
End: 2021-11-22
Payer: COMMERCIAL

## 2021-11-22 DIAGNOSIS — F10.10 NONDEPENDENT ALCOHOL ABUSE, EPISODIC DRINKING BEHAVIOR: ICD-10-CM

## 2021-11-22 DIAGNOSIS — K25.4 GASTROINTESTINAL HEMORRHAGE ASSOCIATED WITH GASTRIC ULCER: Primary | ICD-10-CM

## 2021-11-22 DIAGNOSIS — J45.30 MILD PERSISTENT ASTHMA WITHOUT COMPLICATION: ICD-10-CM

## 2021-11-22 DIAGNOSIS — I48.91 ATRIAL FIBRILLATION, UNSPECIFIED TYPE (H): ICD-10-CM

## 2021-11-22 PROBLEM — K92.2 GASTROINTESTINAL HEMORRHAGE, UNSPECIFIED GASTROINTESTINAL HEMORRHAGE TYPE: Status: RESOLVED | Noted: 2021-11-10 | Resolved: 2021-11-22

## 2021-11-22 PROCEDURE — 99214 OFFICE O/P EST MOD 30 MIN: CPT | Mod: 95 | Performed by: FAMILY MEDICINE

## 2021-11-22 RX ORDER — FUROSEMIDE 20 MG
20 TABLET ORAL DAILY
Qty: 90 TABLET | Refills: 3 | Status: SHIPPED | OUTPATIENT
Start: 2021-11-22 | End: 2022-12-09

## 2021-11-22 NOTE — PROGRESS NOTES
"Tremaine is a 51 year old who is being evaluated via a billable video visit.      How would you like to obtain your AVS? MyChart  If the video visit is dropped, the invitation should be resent by: phone  Will anyone else be joining your video visit? No    Video Start Time: failed to connect    Assessment & Plan     Gastrointestinal hemorrhage associated with gastric ulcer   likely bleeding stopped  - omeprazole (PRILOSEC) 20 MG DR capsule; Take 1 capsule (20 mg) by mouth daily    Mild persistent asthma without complication  at goal  - beclomethasone HFA (QVAR REDIHALER) 80 MCG/ACT inhaler; Inhale 1 puff into the lungs 2 times daily    Atrial fibrillation, unspecified type (H)  Rate controlled, off anticoag due to bleed  - furosemide (LASIX) 20 MG tablet; Take 1 tablet (20 mg) by mouth daily  - diltiazem ER COATED BEADS (CARDIAZEM LA) 180 MG 24 hr tablet; Take 1 tablet (180 mg) by mouth daily    Review of external notes as documented elsewhere in note  Ordering of each unique test  Prescription drug management  30 minutes spent on the date of the encounter doing chart review, history and exam, documentation and further activities per the note     BMI:   Estimated body mass index is 33.12 kg/m  as calculated from the following:    Height as of 11/10/21: 1.803 m (5' 11\").    Weight as of 11/12/21: 107.7 kg (237 lb 7 oz).   Weight management plan: Discussed healthy diet and exercise guidelines    Patient Instructions   Continue omeprazole 20 mg once daily x1 month, then stop    No future taking ibuprofen    Plans to abstain from alcohol x12 months, then reassess    Plans to handle stress himself, if worse will contact regarding mental health referral for therapist      Return in about 1 year (around 11/22/2022), or if symptoms worsen or fail to improve.    Samy Marcelino MD  [facility]    Subjective   Tremaine is a 51 year old who presents for the following health issues    HPI   Anemia  Recent " hospitalization for upper GI bleed thought to be due to ibuprofen and alcohol use.  Feels better, takes omeprazole, stopped drinking    Asthma Follow-Up    Was ACT completed today?  No      Do you have a cough?  No    Are you experiencing any wheezing in your chest?  No    Do you have any shortness of breath?  No     How often are you using a short-acting (rescue) inhaler or nebulizer, such as Albuterol?  Daily    How many days per week do you miss taking your asthma controller medication?  0    Please describe any recent triggers for your asthma: smoke, upper respiratory infections, dust mites, pollens and mold    Have you had any Emergency Room Visits, Urgent Care Visits, or Hospital Admissions since your last office visit?  Yes  Number of hospitalizations for asthma:one    A-fib  Rate controlled. Sees cards    How many days per week do you miss taking your medication? 0      Review of Systems   Constitutional, HEENT, cardiovascular, pulmonary, gi and gu systems are negative, except as otherwise noted.      Objective       Vitals:  No vitals were obtained today due to virtual visit.    Physical Exam   GENERAL: Healthy, alert and no distress  RESP: No audible wheeze, cough, or visible cyanosis.  No visible retractions or increased work of breathing.    PSYCH: Mentation appears normal, affect normal/bright, judgement and insight intact, normal speech and appearance well-groomed.        Video-Visit Details    Type of service:  Video Visit    Video End Time: Switched to phone    Originating Location (pt. Location): Home    Distant Location (provider location):  [facility]     Platform used for Video Visit: "[employer]

## 2021-11-22 NOTE — PATIENT INSTRUCTIONS
Continue omeprazole 20 mg once daily x1 month, then stop    No future taking ibuprofen    Plans to abstain from alcohol x12 months, then reassess    Plans to handle stress himself, if worse will contact regarding mental health referral for therapist

## 2021-11-23 DIAGNOSIS — F10.10 ALCOHOL ABUSE: ICD-10-CM

## 2021-11-23 DIAGNOSIS — K25.4 GASTROINTESTINAL HEMORRHAGE ASSOCIATED WITH GASTRIC ULCER: Primary | ICD-10-CM

## 2021-11-24 NOTE — RESULT ENCOUNTER NOTE
Shree Penaloza: Your recent results show:    -Prediabetes hyperglycemia continues to improve  -Liver inflammation is slightly worse over the past 11 days (2 of 3 tests), with 2 of 3 tests a year ago  -Anemia/RBCs continue to improve    Continue alcohol abstinence, follow-up fasting labs in 6 to 8 weeks.  Contact if questions, hope you and the family have a nice Thanksgiving!    Samy

## 2021-11-27 PROBLEM — F10.10 NONDEPENDENT ALCOHOL ABUSE, EPISODIC DRINKING BEHAVIOR: Status: ACTIVE | Noted: 2021-11-27

## 2021-11-27 PROBLEM — J45.30 MILD PERSISTENT ASTHMA WITHOUT COMPLICATION: Status: ACTIVE | Noted: 2021-11-27

## 2021-12-01 DIAGNOSIS — K25.4 GASTROINTESTINAL HEMORRHAGE ASSOCIATED WITH GASTRIC ULCER: Primary | ICD-10-CM

## 2021-12-02 ENCOUNTER — TELEPHONE (OUTPATIENT)
Dept: GASTROENTEROLOGY | Facility: CLINIC | Age: 51
End: 2021-12-02
Payer: COMMERCIAL

## 2021-12-02 NOTE — TELEPHONE ENCOUNTER
Screening Questions  1. Are you active on mychart? Y    2. What insurance is in the chart? BCBS    2.  Ordering/Referring Provider: Samy Marcelino MD    3. BMI 32.1, If greater than 40 review exclusion criteria    4.  Respiratory Screening (If yes to any of the following Hospital setting only):     Do you use daily home oxygen? N  Do you have mod to severe Obstructive Sleep Apnea? Y CPAP   Do you have Pulmonary Hypertension? N   Do you have UNCONTROLLED asthma? N    5. Have you had a heart or lung transplant (If yes, please review exclusion criteria) ? N    6. Are you currently on dialysis or have chronic kidney disease? N    7. Have you had a stroke or Transient ischemic attack (TIA) within 6 months? N    8. In the past 6 months, have you had any heart related issues including cardiomyopathy or heart attack? N                 If yes, did it require cardiac stenting or other implantable device?      9. Do you have any implantable devices in your body (pacemaker, defib, LVAD)? N    10. Do you take nitroglycerin? If yes, how often? N    11. Are you currently taking any blood thinners?N    12. Are you a diabetic? N    13. (Females) Are you currently pregnant?   If yes, how many weeks?      15. Are you taking any prescription pain medications on a routine schedule? N If yes, MAC sedation.    16. Do you have any chemical dependencies such as alcohol, street drugs, or methadone? NIf yes, MAC sedation.    17. Do you have any history of post-traumatic stress syndrome, severe anxiety or history of psychosis? N    18. Do you transfer independently? Y    19.  Do you have any issues with constipation?     20. Preferred Pharmacy for Pre Prescription [facility]    Scheduling Details    Which Colonoscopy Prep was Sent?: MPREP  Procedure Scheduled: EGD/COLON  Surgeon: LEONELA  Date of Procedure: 1/11  Location:   Caller (Please ask for phone number if not scheduled by patient):       Sedation Type: CS  Conscious Sedation-  Needs  for 6 hours after the procedure  MAC/General-Needs  for 24 hours after procedure    Pre-op Required at [facility] [place], [place] and OR for MAC sedation:   (if yes advise patient they will need a pre-op prior to procedure)      Is patient on blood thinners? -N (If yes- inform patient to follow up with PCP or provider for follow up instructions)     Informed patient they will need an adult  Y  Cannot take any type of public or medical transportation alone    [facility]Pro[facility] or Externally: Y    Confirmed Nurse will call to complete assessment Y    Additional comments:

## 2021-12-06 PROBLEM — R60.0 LOCALIZED EDEMA: Status: ACTIVE | Noted: 2021-12-06

## 2021-12-06 PROBLEM — Z83.3 FAMILY HISTORY OF DIABETES MELLITUS: Status: ACTIVE | Noted: 2021-12-06

## 2021-12-15 DIAGNOSIS — Z11.59 ENCOUNTER FOR SCREENING FOR OTHER VIRAL DISEASES: ICD-10-CM

## 2022-01-07 ENCOUNTER — LAB (OUTPATIENT)
Dept: URGENT CARE | Facility: URGENT CARE | Age: 52
End: 2022-01-07
Payer: COMMERCIAL

## 2022-01-07 DIAGNOSIS — Z11.59 ENCOUNTER FOR SCREENING FOR OTHER VIRAL DISEASES: ICD-10-CM

## 2022-01-07 PROCEDURE — U0005 INFEC AGEN DETEC AMPLI PROBE: HCPCS

## 2022-01-07 PROCEDURE — U0003 INFECTIOUS AGENT DETECTION BY NUCLEIC ACID (DNA OR RNA); SEVERE ACUTE RESPIRATORY SYNDROME CORONAVIRUS 2 (SARS-COV-2) (CORONAVIRUS DISEASE [COVID-19]), AMPLIFIED PROBE TECHNIQUE, MAKING USE OF HIGH THROUGHPUT TECHNOLOGIES AS DESCRIBED BY CMS-2020-01-R: HCPCS

## 2022-01-08 LAB — SARS-COV-2 RNA RESP QL NAA+PROBE: NEGATIVE

## 2022-01-11 ENCOUNTER — HOSPITAL ENCOUNTER (OUTPATIENT)
Facility: CLINIC | Age: 52
Discharge: HOME | End: 2022-01-11
Attending: SPECIALIST | Admitting: SPECIALIST
Payer: COMMERCIAL

## 2022-01-11 VITALS
WEIGHT: 230 LBS | HEIGHT: 71 IN | HEART RATE: 69 BPM | SYSTOLIC BLOOD PRESSURE: 112 MMHG | DIASTOLIC BLOOD PRESSURE: 76 MMHG | RESPIRATION RATE: 35 BRPM | BODY MASS INDEX: 32.2 KG/M2 | OXYGEN SATURATION: 98 %

## 2022-01-11 DIAGNOSIS — K25.4 GASTROINTESTINAL HEMORRHAGE ASSOCIATED WITH GASTRIC ULCER: Primary | ICD-10-CM

## 2022-01-11 LAB
COLONOSCOPY: NORMAL
UPPER GI ENDOSCOPY: NORMAL

## 2022-01-11 PROCEDURE — 43239 EGD BIOPSY SINGLE/MULTIPLE: CPT | Performed by: SPECIALIST

## 2022-01-11 PROCEDURE — 99153 MOD SED SAME PHYS/QHP EA: CPT | Performed by: SPECIALIST

## 2022-01-11 PROCEDURE — G0500 MOD SEDAT ENDO SERVICE >5YRS: HCPCS | Performed by: SPECIALIST

## 2022-01-11 PROCEDURE — 45385 COLONOSCOPY W/LESION REMOVAL: CPT | Mod: PT | Performed by: SPECIALIST

## 2022-01-11 PROCEDURE — 250N000011 HC RX IP 250 OP 636: Performed by: SPECIALIST

## 2022-01-11 PROCEDURE — 250N000009 HC RX 250: Performed by: SPECIALIST

## 2022-01-11 PROCEDURE — 88305 TISSUE EXAM BY PATHOLOGIST: CPT | Mod: TC | Performed by: SPECIALIST

## 2022-01-11 RX ORDER — FENTANYL CITRATE 50 UG/ML
INJECTION, SOLUTION INTRAMUSCULAR; INTRAVENOUS PRN
Status: COMPLETED | OUTPATIENT
Start: 2022-01-11 | End: 2022-01-11

## 2022-01-11 RX ORDER — LIDOCAINE 40 MG/G
CREAM TOPICAL
Status: DISCONTINUED | OUTPATIENT
Start: 2022-01-11 | End: 2022-01-11 | Stop reason: HOSPADM

## 2022-01-11 RX ORDER — ONDANSETRON 2 MG/ML
4 INJECTION INTRAMUSCULAR; INTRAVENOUS
Status: DISCONTINUED | OUTPATIENT
Start: 2022-01-11 | End: 2022-01-11 | Stop reason: HOSPADM

## 2022-01-11 RX ADMIN — MIDAZOLAM 2 MG: 1 INJECTION INTRAMUSCULAR; INTRAVENOUS at 08:35

## 2022-01-11 RX ADMIN — FENTANYL CITRATE 100 MCG: 50 INJECTION, SOLUTION INTRAMUSCULAR; INTRAVENOUS at 08:34

## 2022-01-11 RX ADMIN — FENTANYL CITRATE 50 MCG: 50 INJECTION, SOLUTION INTRAMUSCULAR; INTRAVENOUS at 09:14

## 2022-01-11 RX ADMIN — MIDAZOLAM 1 MG: 1 INJECTION INTRAMUSCULAR; INTRAVENOUS at 09:15

## 2022-01-11 RX ADMIN — TOPICAL ANESTHETIC 1 SPRAY: 200 SPRAY DENTAL; PERIODONTAL at 09:18

## 2022-01-11 ASSESSMENT — MIFFLIN-ST. JEOR: SCORE: 1910.4

## 2022-01-11 NOTE — H&P
Pre-Endoscopy History and Physical     Tremaine King MRN# [id number]   YOB: 1970 Age: 51 year old     Date of Procedure: 1/11/2022  Primary care provider: Samy Marcelino  Type of Endoscopy: colonoscopy and esophagogastroduodenoscopy (upper GI endoscopy)  Reason for Procedure: screening, f/u gastric ulcer  Type of Anesthesia Anticipated: Moderate sedation    HPI:    Tremaine is a 51 year old male who will be undergoing the above procedure.      A history and physical has been performed. The patient's medications and allergies have been reviewed. The risks and benefits of the procedure and the sedation options and risks were discussed with the patient.  All questions were answered and informed consent was obtained.      He denies a personal or family history of anesthesia complications or bleeding disorders.     Allergies   Allergen Reactions     Hctz [Hydrochlorothiazide]      Hypokalemia       Spironolactone      hyponatremia        Current Facility-Administered Medications   Medication     fentaNYL (PF) (SUBLIMAZE) injection     lidocaine (LMX4) cream     lidocaine 1 % 0.1-1 mL     midazolam (VERSED) injection     ondansetron (ZOFRAN) injection 4 mg     sodium chloride (PF) 0.9% PF flush 3 mL     sodium chloride (PF) 0.9% PF flush 3 mL       Patient Active Problem List   Diagnosis     History of repair of mitral valve     CARDIOVASCULAR SCREENING; LDL GOAL LESS THAN 130     Essential hypertension with goal blood pressure less than 140/90     Idiopathic gout, unspecified chronicity, unspecified site     BMI 36.0-36.9,adult     KOTA (obstructive sleep apnea)     Lumbar disc herniation     Morbid obesity (H)     Atrial fibrillation, unspecified type (H)     Nondependent alcohol abuse, episodic drinking behavior     Mild persistent asthma without complication     Family history of diabetes mellitus     Localized edema        Past Medical History:   Diagnosis Date     A-fib (H)      ASD (atrial septal  "defect)     s/p repair     Gastric ulcer      Hypertension      Lumbar disc herniation 2018    better after steroid inj and PT     Spinal headache     cpap     Uncomplicated asthma         Past Surgical History:   Procedure Laterality Date     ORTHOPEDIC SURGERY      arthroscopy bilat knee, torn meniscus     REPAIR ATRIAL SEPTAL DEFECT  AGE 3     REPAIR VALVE MITRAL  AGE 3 AND 2005    annuloplasty ring and MVR in 2005     REPAIR VALVE MITRAL         Social History     Tobacco Use     Smoking status: Former Smoker     Types: Cigars     Quit date: 1989     Years since quittin.0     Smokeless tobacco: Never Used     Tobacco comment: quit smoking cigarettes and cigars at ~ age 22 (was light smoker for about 4 years))   Substance Use Topics     Alcohol use: Not Currently     Comment: since ulcer 2021       Family History   Problem Relation Age of Onset     Diabetes Mother         onset past age 70       REVIEW OF SYSTEMS:   5 point ROS negative except as noted above in HPI, including Gen., Resp., CV, GI &  system review.    PHYSICAL EXAM:   /73   Pulse 67   Resp 10   Ht 1.803 m (5' 11\")   Wt 104.3 kg (230 lb)   SpO2 100%   BMI 32.08 kg/m   Estimated body mass index is 32.08 kg/m  as calculated from the following:    Height as of this encounter: 1.803 m (5' 11\").    Weight as of this encounter: 104.3 kg (230 lb).   GENERAL APPEARANCE: healthy  MENTAL STATUS: alert or interactive  AIRWAY EXAM: Mallampatti Class I (visualization of the soft palate, fauces, uvula, anterior and posterior pillars)  RESP: lungs clear to auscultation - no rales, rhonchi or wheezes  CV: grade 2/6 systolic murmur heard best over the LLSB and irregular rhythm    DIAGNOSTICS:    Not indicated    IMPRESSION   ASA Class 2 - Mild systemic disease    PLAN:   Colonoscopy and endoscopy    The above has been forwarded to the consulting provider.      Signed Electronically by: Tremaine Bernard MD  " 2022

## 2022-01-12 LAB
PATH REPORT.COMMENTS IMP SPEC: NORMAL
PATH REPORT.COMMENTS IMP SPEC: NORMAL
PATH REPORT.FINAL DX SPEC: NORMAL
PATH REPORT.GROSS SPEC: NORMAL
PATH REPORT.MICROSCOPIC SPEC OTHER STN: NORMAL
PATH REPORT.RELEVANT HX SPEC: NORMAL
PHOTO IMAGE: NORMAL

## 2022-01-12 PROCEDURE — 88305 TISSUE EXAM BY PATHOLOGIST: CPT | Mod: 26 | Performed by: PATHOLOGY

## 2022-01-12 ASSESSMENT — ENCOUNTER SYMPTOMS
SLEEP DISTURBANCES DUE TO BREATHING: 0
ORTHOPNEA: 0
PALPITATIONS: 0
SYNCOPE: 0
EXERCISE INTOLERANCE: 0
RECTAL PAIN: 0
HEARTBURN: 0
BLOOD IN STOOL: 0
LIGHT-HEADEDNESS: 0
HYPOTENSION: 0
BLOATING: 0
LEG PAIN: 0
CONSTIPATION: 0
BOWEL INCONTINENCE: 0
NAUSEA: 0
DIARRHEA: 0
HYPERTENSION: 1
VOMITING: 0
JAUNDICE: 0

## 2022-01-12 ASSESSMENT — SLEEP AND FATIGUE QUESTIONNAIRES
HOW LIKELY ARE YOU TO NOD OFF OR FALL ASLEEP WHILE SITTING AND READING: WOULD NEVER DOZE
HOW LIKELY ARE YOU TO NOD OFF OR FALL ASLEEP WHILE SITTING AND TALKING TO SOMEONE: WOULD NEVER DOZE
HOW LIKELY ARE YOU TO NOD OFF OR FALL ASLEEP WHILE SITTING QUIETLY AFTER LUNCH WITHOUT ALCOHOL: WOULD NEVER DOZE
HOW LIKELY ARE YOU TO NOD OFF OR FALL ASLEEP WHILE WATCHING TV: WOULD NEVER DOZE
HOW LIKELY ARE YOU TO NOD OFF OR FALL ASLEEP WHEN YOU ARE A PASSENGER IN A CAR FOR AN HOUR WITHOUT A BREAK: WOULD NEVER DOZE
HOW LIKELY ARE YOU TO NOD OFF OR FALL ASLEEP WHILE LYING DOWN TO REST IN THE AFTERNOON WHEN CIRCUMSTANCES PERMIT: SLIGHT CHANCE OF DOZING
HOW LIKELY ARE YOU TO NOD OFF OR FALL ASLEEP WHILE SITTING INACTIVE IN A PUBLIC PLACE: WOULD NEVER DOZE
HOW LIKELY ARE YOU TO NOD OFF OR FALL ASLEEP IN A CAR, WHILE STOPPED FOR A FEW MINUTES IN TRAFFIC: WOULD NEVER DOZE

## 2022-01-12 NOTE — RESULT ENCOUNTER NOTE
Assessment: Adenomatous polyps are benign, but have malignant potential. This increases the risk of colon cancer and impacts the frequency of colonoscopy. Based on the updated polypectomy surveillance recommendations, individuals with 1-2 adenomas <10 mm should undergo surveillance colonoscopy in 7-10 years.     The gastric biopsies showed no evidence of Helicobacter pylori. Given recent bleeding from a gastric ulcer, prevention of recurrent ulcer and bleeding depends concomitant medication and management of his atrial fibrillation.      Recommendations: Surveillance colonoscopy in 7 years (2029 ).    If Mr. King needs aspirin or NSAIDs, or if his cardiologist recommends warfarin or a direct oral anticoagulant for prophylaxis against stroke, he should remain on  a proton pump inhibitor for as long as these medications are used.

## 2022-01-18 ENCOUNTER — VIRTUAL VISIT (OUTPATIENT)
Dept: SLEEP MEDICINE | Facility: CLINIC | Age: 52
End: 2022-01-18
Payer: COMMERCIAL

## 2022-01-18 DIAGNOSIS — G47.33 OSA (OBSTRUCTIVE SLEEP APNEA): Primary | ICD-10-CM

## 2022-01-18 PROCEDURE — 99203 OFFICE O/P NEW LOW 30 MIN: CPT | Mod: 95 | Performed by: STUDENT IN AN ORGANIZED HEALTH CARE EDUCATION/TRAINING PROGRAM

## 2022-01-18 NOTE — PROGRESS NOTES
"Tremaine is a 51 year old who is being evaluated via a billable video visit.      How would you like to obtain your AVS? MyChart  If the video visit is dropped, the invitation should be resent by: Send to e-mail at: [email or web address]  Will anyone else be joining your video visit? No  {If patient encounters technical issues they should call [phone] :[phone]}    No vitals.    Video Start Time: {video visit start/end time for provider to select:152948}  Video-Visit Details    Type of service:  Video Visit    Video End Time:{video visit start/end time for provider to select:152948}    Originating Location (pt. Location): {video visit patient location:678578::\"Home\"}    Distant Location (provider location):  [facility]     Platform used for Video Visit: {Virtual Visit Platforms:705777::\"[employer]\"}  "

## 2022-01-18 NOTE — PROGRESS NOTES
Tremaine is a 51 year old who is being evaluated via a billable video visit.       How would you like to obtain your AVS? MyChart  If the video visit is dropped, the invitation should be resent by: Send to e-mail at: [email or web address]  Will anyone else be joining your video visit? No      Video-Visit Details     Type of service:  Video Visit  Video Start Time: 801AM  Video End Time:8:31 AM    Originating Location (pt. Location): Home     Distant Location (provider location):  [facility] [place]      Platform used for Video Visit: [facility]  Patient Name: Tremaine King  MRN: [id number]  : 1970  Date of Clinic Visit: 2022  Primary Care Provider: Samy Marcelino  Referring Provider: No ref. provider found  -------------------------------------------------------------------------------------------------------------------------------  CHIEF COMPLAINT: KOTA    HISTORY OF PRESENT ILLNESS:  Tremaine King is a 51 year old male w/ PMH of atrial septal defect repair, mitral valve repair, atrial fibrillation, lumbar disc herniation, asthma hypertension and obstructive sleep apnea presenting for evaluation as a regular follow-up for KOTA. Patient was last seen 2018   Patient has had a prior PSG in . Important findings: moderate obstructive sleep apnea     DME: [employer]   [employer]  CPAP 12 cmH2O 30 day usage data:  96% of days with > 4 hours of use.  days with no use.   Average use 419 minutes per day.   Average leak 36.8 LPM.  Average % of night in large leak 0%.    AHI 1.07 events per hour.     Registered device? Yes   No ozone cleaning use prior to the recall.  Residual AHI is: at goal.  Air hunger: no   Mask tolerance: yes   Skin irritation: no   Daytime sleepiness/napping: no   Snoring, snort arousals, gasping while using CPAP: no   Insufficient sleep, devoting <7 or more hours to sleep: no     Sleep scores  ESS:   PONCHO: 1    Sleep-Wake  schedule  Bedtime: 1030pm  Sleep onset: < 30 mins   Nocturnal awakenings: once occasionally use bathroom  Wake time: 6 am   Sleep inertia: no   Naps: no     Sleep-related behaviors  Restless legs: no   Active sleeper?: no   Dream enactment: no   Sleep walking: no     Sleep hygiene  Sleep partners: wife   Caffeine: 2 cups coffee in AM  EtOH:no, not since November 2021 - gastric ulcer surgery   Tobacco: no   Sleep-aids: no     REVIEW OF SYSTEMS:   Answers for HPI/ROS submitted by the patient on 1/12/2022  General Symptoms: No  Skin Symptoms: No  HENT Symptoms: No  EYE SYMPTOMS: No  HEART SYMPTOMS: Yes  LUNG SYMPTOMS: No  INTESTINAL SYMPTOMS: Yes  URINARY SYMPTOMS: No  REPRODUCTIVE SYMPTOMS: No  SKELETAL SYMPTOMS: No  BLOOD SYMPTOMS: No  NERVOUS SYSTEM SYMPTOMS: No  MENTAL HEALTH SYMPTOMS: No  Chest pain or pressure: No  Fast or irregular heartbeat: No  Pain in legs with walking: No  Trouble breathing while lying down: No  Fingers or toes appear blue: No  High blood pressure: Yes  Low blood pressure: No  Fainting: No  Murmurs: Yes  Pacemaker: No  Varicose veins: No  Edema or swelling: Yes  Wake up at night with shortness of breath: No  Light-headedness: No  Exercise intolerance: No  Heart burn or indigestion: No  Nausea: No  Vomiting: No  Bloating: No  Constipation: No  Diarrhea: No  Blood in stool: No  Black stools: Yes  Rectal or Anal pain: No  Fecal incontinence: No  Yellowing of skin or eyes: No  Vomit with blood: No  Change in stools: No      Allergies   Allergen Reactions     Hctz [Hydrochlorothiazide]      Hypokalemia       Spironolactone      hyponatremia     Current Outpatient Medications   Medication Sig Dispense Refill     AFLURIA QUADRIVALENT injection Inject as directed.  To be administered by a pharmacist.       albuterol (PROAIR HFA/PROVENTIL HFA/VENTOLIN HFA) 108 (90 Base) MCG/ACT inhaler Inhale 2 puffs into the lungs every 6 hours as needed for shortness of breath / dyspnea 8.5 g 3     allopurinol  (ZYLOPRIM) 300 MG tablet Take 1 tablet (300 mg) by mouth daily 90 tablet 3     amLODIPine (NORVASC) 10 MG tablet Take 1 tablet (10 mg) by mouth daily 90 tablet 3     amoxicillin (AMOXIL) 500 MG capsule Please take a total of 2 grams (four 500 mg capsules) 30-60 minutes prior to dental procedure. 12 capsule 3     beclomethasone HFA (QVAR REDIHALER) 80 MCG/ACT inhaler Inhale 1 puff into the lungs 2 times daily 10.6 g 11     diltiazem ER COATED BEADS (CARDIAZEM LA) 180 MG 24 hr tablet Take 1 tablet (180 mg) by mouth daily 90 tablet 3     furosemide (LASIX) 20 MG tablet Take 1 tablet (20 mg) by mouth daily 90 tablet 3     metoprolol succinate ER (TOPROL-XL) 100 MG 24 hr tablet TAKE 1 AND 1/2 TABLETS (150MG)  BY MOUTH ONE TIME DAILY. 135 tablet 3     omeprazole (PRILOSEC) 20 MG DR capsule Take 1 capsule (20 mg) by mouth daily 30 capsule 0     omeprazole (PRILOSEC) 20 MG DR capsule Take 1 capsule (20 mg) by mouth daily       order for DME Equipment being ordered: CPAP  Patient Tremaine King was set up at [facility] on September 14, 2015. Patient received a [employer] DreamStation Auto CPAP Auto. Pressures were set at Auto 12 - 12 cm H2O.   Patient s ramp is 9 cm H2O for Auto and FLEX/EPR is A Flex.  Patient received a [employer] Mask name: BLUE GEL NASAL  Nasal mask Size Medium, heated tubing and heated humidifier.  Patient is not enrolled in the STM Program and does not need to meet compliance. Samina Arora       sildenafil (REVATIO) 20 MG tablet Take 1 tablet (20 mg) by mouth daily as needed (erectile dysfunction) 12 tablet 1     Past Medical History:   Diagnosis Date     A-fib (H)      ASD (atrial septal defect)     s/p repair     Gastric ulcer      Hypertension      Lumbar disc herniation 05/2018    better after steroid inj and PT     Spinal headache     cpap     Uncomplicated asthma       Past Surgical History:   Procedure Laterality Date     COLONOSCOPY N/A 1/11/2022    Procedure: COLONOSCOPY,  FLEXIBLE, WITH LESION REMOVAL USING SNARE;  Surgeon: Tremaine Bernard MD;  Location:  GI     ESOPHAGOSCOPY, GASTROSCOPY, DUODENOSCOPY (EGD), COMBINED N/A 2022    Procedure: ESOPHAGOGASTRODUODENOSCOPY, WITH BIOPSY;  Surgeon: Tremaine Bernard MD;  Location:  GI     ORTHOPEDIC SURGERY      arthroscopy bilat knee, torn meniscus     REPAIR ATRIAL SEPTAL DEFECT  AGE 3     REPAIR VALVE MITRAL  AGE 3 AND 2005    annuloplasty ring and MVR in 2005     REPAIR VALVE MITRAL       Family History   Problem Relation Age of Onset     Diabetes Mother         onset past age 70       PHYSICAL EXAMINATION:  Vitals: no vitals were taken for this virtual visit  There is no height or weight on file to calculate BMI.  General: in no acute distress   Pulmonary: symmetrical breathing pattern   Neuro: alert; language is fluent with intact comprehension, no facial asymmetry or ptosis, normal speech  Psych: cooperative, appropriate mood and affect    INVESTIGATIONS:  Prior PS2009: The patient had marked sleep disordered breathing, all obstructive events.  The apnea-hypoxia index was 24.  The lowest desaturations were markedly decreased at 65%.     ASSESSMENT AND PLAN:  Tremaine King is a 51 year old male w/ PMH of atrial septal defect repair, mitral valve repair, atrial fibrillation, lumbar disc herniation, asthma hypertension and obstructive sleep apnea presenting for evaluation as a regular follow-up for KOTA.      #Moderate KOTA   - Residual AHI is at goal  - Patient is meeting use compliance requirements, and they are getting subjective benefit in sleep improvement and daytime energy levels.  - Can continue same pressure of CPAP 12 cm H2O  - Patient has registered his recalled device with [employer]    - His CPAP is from , he is eligible for new machine but since his current CPAP is working well, he will continue to use it until [employer] sends him a new device from the recall.  After he obtains the  "replacement device through [facility], he was instructed to take the device to [facility] medical DME to make sure that the pressure settings are correctly set at 12 cmH2O   - orders placed for renewal of PAP supplies    - antibacterial filters were recommended to be used. Continue to not use ozone /other devices for CPAP    - we discussed weight management via healthy diet, and exercise.   - Recommended 7-8 hours of sleep     Follow up at sleep clinic in 1 year.    Patient was strongly advised to avoid driving, operating any heavy machinery or other hazardous situations while drowsy or sleepy.  Patient was counseled on the importance of driving while alert, to pull over if drowsy, or nap before getting into the vehicle if sleepy.     The patient was staffed and plan was formulated with Dr. Greer.    Taylor Rodrigez MD  Sleep Medicine Fellow  [facility] [place]     The above note was dictated using voice recognition software. Although reviewed after completion, some word and grammatical error may remain . Please contact the author for any clarifications.     Attending: As the attending physician I was present with  Dr. Taylor Rodrigez, during this clinic visit. I personally reviewed the wayne aspects of the history, chart review and discussed the plan with the patient and I agree with the above documentation.  \" Total time spent was 40 minutes with  Tremaine King  during today's video visit, most of this time was spent counseling the patient and  coordinating care regarding KOTA, PAP device recall, CPAP treatment,  weight management , review  PAP download and chart review., including documentation and further activities as noted above.\"     Gokul Greer MD  [facility] [place]  [address], [address], [place] [address]             "

## 2022-02-02 DIAGNOSIS — J45.30 MILD PERSISTENT ASTHMA WITHOUT COMPLICATION: ICD-10-CM

## 2022-02-02 RX ORDER — ALBUTEROL SULFATE 90 UG/1
2 AEROSOL, METERED RESPIRATORY (INHALATION) EVERY 6 HOURS PRN
Qty: 8.5 G | Refills: 0 | Status: SHIPPED | OUTPATIENT
Start: 2022-02-02

## 2022-02-02 NOTE — TELEPHONE ENCOUNTER
"Requested Prescriptions   Signed Prescriptions Disp Refills    albuterol (PROAIR HFA/PROVENTIL HFA/VENTOLIN HFA) 108 (90 Base) MCG/ACT inhaler 8.5 g 0     Sig: Inhale 2 puffs into the lungs every 6 hours as needed for shortness of breath / dyspnea       Asthma Maintenance Inhalers - Anticholinergics Passed - 2/2/2022  2:00 AM        Passed - Patient is age 12 years or older        Passed - Asthma control assessment score within normal limits in last 6 months     Please review ACT score.           Passed - Medication is active on med list        Passed - Recent (6 mo) or future (30 days) visit within the authorizing provider's specialty     Patient had office visit in the last 6 months or has a visit in the next 30 days with authorizing provider or within the authorizing provider's specialty.  See \"Patient Info\" tab in inbasket, or \"Choose Columns\" in Meds & Orders section of the refill encounter.           Short-Acting Beta Agonist Inhalers Protocol  Passed - 2/2/2022  2:00 AM        Passed - Patient is age 12 or older        Passed - Asthma control assessment score within normal limits in last 6 months     Please review ACT score.           Passed - Medication is active on med list        Passed - Recent (6 mo) or future (30 days) visit within the authorizing provider's specialty     Patient had office visit in the last 6 months or has a visit in the next 30 days with authorizing provider or within the authorizing provider's specialty.  See \"Patient Info\" tab in inbasket, or \"Choose Columns\" in Meds & Orders section of the refill encounter.               Thanks,  TED Le  [facility]     "

## 2022-02-21 DIAGNOSIS — Z13.6 CARDIOVASCULAR SCREENING; LDL GOAL LESS THAN 130: Primary | ICD-10-CM

## 2022-02-22 ENCOUNTER — LAB (OUTPATIENT)
Dept: LAB | Facility: CLINIC | Age: 52
End: 2022-02-22
Payer: COMMERCIAL

## 2022-02-22 DIAGNOSIS — R89.9 ABNORMAL LABORATORY TEST: ICD-10-CM

## 2022-02-22 DIAGNOSIS — K25.4 GASTROINTESTINAL HEMORRHAGE ASSOCIATED WITH GASTRIC ULCER: ICD-10-CM

## 2022-02-22 DIAGNOSIS — F10.10 ALCOHOL ABUSE: ICD-10-CM

## 2022-02-22 DIAGNOSIS — Z13.6 CARDIOVASCULAR SCREENING; LDL GOAL LESS THAN 130: ICD-10-CM

## 2022-02-22 LAB
ALBUMIN SERPL-MCNC: 4.2 G/DL (ref 3.4–5)
ALP SERPL-CCNC: 93 U/L (ref 40–150)
ALT SERPL W P-5'-P-CCNC: 41 U/L (ref 0–70)
ANION GAP SERPL CALCULATED.3IONS-SCNC: 8 MMOL/L (ref 3–14)
AST SERPL W P-5'-P-CCNC: 35 U/L (ref 0–45)
BILIRUB SERPL-MCNC: 0.5 MG/DL (ref 0.2–1.3)
BUN SERPL-MCNC: 17 MG/DL (ref 7–30)
CALCIUM SERPL-MCNC: 9.9 MG/DL (ref 8.5–10.1)
CHLORIDE BLD-SCNC: 100 MMOL/L (ref 94–109)
CHOLEST SERPL-MCNC: 171 MG/DL
CO2 SERPL-SCNC: 29 MMOL/L (ref 20–32)
CREAT SERPL-MCNC: 1.07 MG/DL (ref 0.66–1.25)
ERYTHROCYTE [DISTWIDTH] IN BLOOD BY AUTOMATED COUNT: 13.1 % (ref 10–15)
FASTING STATUS PATIENT QL REPORTED: YES
GFR SERPL CREATININE-BSD FRML MDRD: 83 ML/MIN/1.73M2
GLUCOSE BLD-MCNC: 162 MG/DL (ref 70–99)
HCT VFR BLD AUTO: 42.7 % (ref 40–53)
HDLC SERPL-MCNC: 45 MG/DL
HGB BLD-MCNC: 14.5 G/DL (ref 13.3–17.7)
LDLC SERPL CALC-MCNC: 100 MG/DL
MCH RBC QN AUTO: 29.1 PG (ref 26.5–33)
MCHC RBC AUTO-ENTMCNC: 34 G/DL (ref 31.5–36.5)
MCV RBC AUTO: 86 FL (ref 78–100)
NONHDLC SERPL-MCNC: 126 MG/DL
PLATELET # BLD AUTO: 211 10E3/UL (ref 150–450)
POTASSIUM BLD-SCNC: 4 MMOL/L (ref 3.4–5.3)
PROT SERPL-MCNC: 8.5 G/DL (ref 6.8–8.8)
RBC # BLD AUTO: 4.98 10E6/UL (ref 4.4–5.9)
SODIUM SERPL-SCNC: 137 MMOL/L (ref 133–144)
TRIGL SERPL-MCNC: 130 MG/DL
WBC # BLD AUTO: 8.2 10E3/UL (ref 4–11)

## 2022-02-22 PROCEDURE — 80053 COMPREHEN METABOLIC PANEL: CPT

## 2022-02-22 PROCEDURE — 36415 COLL VENOUS BLD VENIPUNCTURE: CPT

## 2022-02-22 PROCEDURE — 85027 COMPLETE CBC AUTOMATED: CPT

## 2022-02-22 PROCEDURE — 80061 LIPID PANEL: CPT

## 2022-02-23 ENCOUNTER — VIRTUAL VISIT (OUTPATIENT)
Dept: CARDIOLOGY | Facility: CLINIC | Age: 52
End: 2022-02-23
Attending: INTERNAL MEDICINE
Payer: COMMERCIAL

## 2022-02-23 DIAGNOSIS — I10 HYPERTENSION, UNSPECIFIED TYPE: ICD-10-CM

## 2022-02-23 DIAGNOSIS — I48.11 LONGSTANDING PERSISTENT ATRIAL FIBRILLATION (H): Primary | ICD-10-CM

## 2022-02-23 PROCEDURE — 99215 OFFICE O/P EST HI 40 MIN: CPT | Mod: 95 | Performed by: INTERNAL MEDICINE

## 2022-02-23 NOTE — NURSING NOTE
Chief Complaint   Patient presents with     Follow Up     Following up on a-fib. Reviewed medications with pt, he has flagged removal for Omerprazole and Amlodipine, please review.     Mulugeta Lerner, Visit Facilitator/MA.

## 2022-02-23 NOTE — PROGRESS NOTES
Service Date: 2022    Samy Marcelino MD  [address], [address]  [place] [address]    RE:  Tremaine King  MRN:  [id number]  :  1970    Dear Dr. Marcelino:    It was a pleasure participating in the care of your patient, Mr. Tremaine King.  As you know, he is a 51-year-old gentleman who I saw today over virtual video visit via [facility] for atrial fibrillation, prior mitral valve repair, hypertension.    His past medical history is significant for the followin.  Migraine headaches.  2.  Gout.  3.  Seasonal allergies with verbal history of mild asthma.  4.  Meniscus tear of the knee.  5.  History of morbid obesity with significant weight loss due to diet and exercise.  6.  Sleep apnea, treated with CPAP in .  7.  History of GI bleed 2021.    As you remember, he had a GI bleed presenting with coffee-ground emesis with black tarry stools with a hemoglobin of 6.6 on 11/10/2021.  EGD showed a 10 mm deep gastric ulcer that was cauterized and injected.  The ulcer was thought secondary to use of ibuprofen 600 mg a day along with alcohol.    Since then, the patient has not taken any alcohol and has not used any NSAIDs.  His current CHADS-VASc2 score is 1 as he has been in persistent atrial fibrillation.  Due to his increased bleeding risk, we have delayed any long-term anticoagulant therapy for now.    From a functional standpoint, the patient is able to ride his bike without gross limitation.  He feels good.  Denies chest pain, shortness of breath, PND, orthopnea, edema.  He rarely feels his heart beating irregularly and only when he lies down quietly.  He denies syncope or near syncope.    His blood pressures have been averaging systolic about 128 and his weight is 238 pounds.  He does take his Lasix every day for swelling.    A 10-point review of systems is otherwise unremarkable.    SOCIAL HISTORY:  He is preparing for a 7-day [employer] event that is a bike ride  for 2-3 hours a day for 7 days.    CURRENT MEDICATIONS:    1.  Diltiazem 180 mg a day.  2.  Lasix 20 mg a day.  3.  Metoprolol succinate 150 mg a day.  4.  Sildenafil.    PHYSICAL EXAMINATION:      VITAL SIGNS:  His blood pressure is 128/78.  His weight is 238 pounds.  GENERAL:  He appears comfortable, well groomed.  PSYCHIATRIC:  He is alert and oriented x3.  HEENT:  Eyes do not appear grossly erythematous or have exudate.  RESPIRATORY:  He is breathing comfortably without gross cough.    The remainder of the comprehensive physical exam was deferred secondary to the COVID-19 pandemic and secondary to video visit restrictions.    LABORATORY:  Labs 02/22/2022:  .  Potassium 4.0, GFR normal, hemoglobin normal.    Echocardiogram 11/03/2021 reveals an ejection fraction of 55%-60%, status post mitral valve repair, mild to moderate residual mitral insufficiency.  Mean gradient 8.  RV systolic pressure 34 plus RA pressure.    Holter monitor 11/12/2021 reveals principal rhythm was AFib, maximum heart rate was 110, no gross arrhythmias identified.      IMPRESSION:      Tremaine is a 51-year-old gentleman with several active issues:    1.  Persistent atrial fibrillation.    The patient has been in AFib since his GI bleed in November.  CHADS-VASc2 score is currently 1 for hypertension only; however, his blood sugars are somewhat elevated and he is borderline diabetic.  Additionally, his mitral valve repair does show evidence of slightly increased gradient, which would contribute to slow flow within the left atrium.    His chances for having a thromboembolic event and possible anticoagulation needs to be balanced with his risk for bleeding.  He has only just discontinued his omeprazole and currently is stable from this standpoint.  His rate is apparently well controlled on diltiazem and metoprolol at present, currently on no anticoagulation.    We did discuss the issue of full anticoagulation again today, and we have decided  that with his CHADS-VASc2 score of 1, we will let his stomach heal for another 6 months and rediscuss the issue at our next appointment and consider starting Eliquis at that time.    His true risk of thromboembolic event is not necessarily only 1%, but possibly higher considering his borderline diabetes and likely possible slower flow in an enlarged left atrium due to increased mitral gradient, (pending echo results in the future) we would consider starting full anticoagulation if he continues to abstain from alcohol and NSAIDs and appears stable from a bleeding and falls risk standpoint.    2.  Mitral valve repair.    His mean mitral valve gradient has been elevated over the years.  Last echo 11/03/2021 revealed a mean gradient of 8 mmHg with mild to moderate mitral insufficiency.  Continue to monitor.    3.  Hypertension, relatively well controlled.    Blood pressure is averaging 128 mmHg systolic.  Continue to follow.    4.  History of ventricular tachycardia.    When inpatient, he had a 39-beat run of VT; however, a followup Holter monitor did not reveal further episodes.  Continue to monitor.    5.  Lower extremity edema and weight gain.    He is doing well on daily Lasix at a weight of 238 pounds.  We will reevaluate at our next appointment.      PLAN:     1.  A 14-day Zio patch monitor in mid October.    2.  Virtual video visit followup in November with echo prior.    At that time, we will rediscuss whether or not to begin full anticoagulation with Eliquis.  If he continues to be stable off of alcohol and NSAIDs and omeprazole, we could consider starting full anticoagulation at that time pending review of his echocardiogram.    Once again, it was a pleasure participating in the care of your patient, Mr. Tremaine King.  Please feel free to contact me at any time if there are any questions regarding his care in the future.    Sincerely,            Tim Gotti MD      Addendum 11/15/22:    Ziopatch monitor  reveals 100% afib, average HR 58bpm  Pauses during sleeping hours  4 beat run NSVT            D: 2022   T: 2022   MT: raffy    Name:     BROOKE HORN  MRN:      [id number]        Account:      [id number]   :      1970           Service Date: 2022       Document: [id number]

## 2022-02-23 NOTE — LETTER
"2/23/2022      RE: Tremaine King  [address]  [facility] [address]       Dear Colleague,    Thank you for the opportunity to participate in the care of your patient, Tremaine King, at the [facility] [place] [facility]. Please see a copy of my visit note below.    Tremaine is a 51 year old who is being evaluated via a billable video visit.      How would you like to obtain your AVS? MyChart  If the video visit is dropped, the invitation should be resent by: Send to e-mail at: [email or web address]  Will anyone else be joining your video visit? No    Mulugeta Lerner, Visit Brent/MA.    The patient has been notified of following:     \"This video visit will be conducted via a call between you and your physician/provider. We have found that certain health care needs can be provided without the need for an in-person physical exam.  This service lets us provide the care you need with a video conversation.  If a prescription is necessary we can send it directly to your pharmacy.  If lab work is needed we can place an order for that and you can then stop by our lab to have the test done at a later time.    Video visits are billed at different rates depending on your insurance coverage.  Please reach out to your insurance provider with any questions.    If during the course of the call the physician/provider feels a video visit is not appropriate, you will not be charged for this service.\"    Patient has given verbal consent for video visit? Yes    How would you like to obtain your AVS? Mail    Video-Visit Details    Type of service:  Video Visit    Video Start Time:818am  Video End Time:837am    Total visit time including video visit, chart review, charting, coordination of care =40min    Originating Location (pt. Location):patient home      Distant Location (provider location):  home office    Platform used for Video Visit: Azra    See dictation " #[id number]    [facility]#:[id number]      Please do not hesitate to contact me if you have any questions/concerns.     Sincerely,     Tim Gotti MD

## 2022-02-23 NOTE — PROGRESS NOTES
"Tremaine is a 51 year old who is being evaluated via a billable video visit.      How would you like to obtain your AVS? MyChart  If the video visit is dropped, the invitation should be resent by: Send to e-mail at: [email or web address]  Will anyone else be joining your video visit? No    Mulugeta Lerner, Visit Facilitator/MA.    The patient has been notified of following:     \"This video visit will be conducted via a call between you and your physician/provider. We have found that certain health care needs can be provided without the need for an in-person physical exam.  This service lets us provide the care you need with a video conversation.  If a prescription is necessary we can send it directly to your pharmacy.  If lab work is needed we can place an order for that and you can then stop by our lab to have the test done at a later time.    Video visits are billed at different rates depending on your insurance coverage.  Please reach out to your insurance provider with any questions.    If during the course of the call the physician/provider feels a video visit is not appropriate, you will not be charged for this service.\"    Patient has given verbal consent for video visit? Yes    How would you like to obtain your AVS? Mail    Video-Visit Details    Type of service:  Video Visit    Video Start Time:818am  Video End Time:837am    Total visit time including video visit, chart review, charting, coordination of care =40min    Originating Location (pt. Location):patient home      Distant Location (provider location):  home office    Platform used for Video Visit: Azra    See dictation #[id number]    [facility]#:[phone]  "

## 2022-02-23 NOTE — PATIENT INSTRUCTIONS
Please schedule the following :    1.  Appointment at the clinic for echocardiogram : early October 2022    2.  Appointment in clinic directly after the echocardiogram for installation of 14 day Ziopatch cardiac monitor    3.  Video visit at least 6 weeks after the cardiac monitor placement : November 2022

## 2022-02-26 DIAGNOSIS — R73.9 HYPERGLYCEMIA: Primary | ICD-10-CM

## 2022-02-26 NOTE — RESULT ENCOUNTER NOTE
Shree Penaloza: Your recent results include an elevated glucose. I was able to add a glycosylated hemoglobin to check for diabetes. Please restrict to a significant degree all simple carbs including sweet drinks, bread, rice, pasta and potatoes; and I'll let you know the result.     Samy

## 2022-06-27 ENCOUNTER — TRANSFERRED RECORDS (OUTPATIENT)
Dept: FAMILY MEDICINE | Facility: CLINIC | Age: 52
End: 2022-06-27

## 2022-07-27 ENCOUNTER — TELEPHONE (OUTPATIENT)
Dept: CARDIOLOGY | Facility: CLINIC | Age: 52
End: 2022-07-27

## 2022-07-29 ENCOUNTER — TELEPHONE (OUTPATIENT)
Dept: CARDIOLOGY | Facility: CLINIC | Age: 52
End: 2022-07-29

## 2022-10-03 ENCOUNTER — ANCILLARY PROCEDURE (OUTPATIENT)
Dept: CARDIOLOGY | Facility: CLINIC | Age: 52
End: 2022-10-03
Attending: INTERNAL MEDICINE
Payer: COMMERCIAL

## 2022-10-03 DIAGNOSIS — I48.11 LONGSTANDING PERSISTENT ATRIAL FIBRILLATION (H): ICD-10-CM

## 2022-10-03 PROCEDURE — 93246 EXT ECG>7D<15D RECORDING: CPT

## 2022-10-03 PROCEDURE — 93248 EXT ECG>7D<15D REV&INTERPJ: CPT | Performed by: INTERNAL MEDICINE

## 2022-10-03 NOTE — PROGRESS NOTES
Per Dr. Gotti, patient to have 14-day Zio monitor placed.  Diagnosis: Longstanding persistent A fib   Monitor placed: Yes  Patient Instructed: Yes  Patient verbalized understanding: Yes  Holter # [id number]    Monitor was placed by TANYA Roy   3:12 PM

## 2022-10-26 ENCOUNTER — ANCILLARY PROCEDURE (OUTPATIENT)
Dept: CARDIOLOGY | Facility: CLINIC | Age: 52
End: 2022-10-26
Attending: INTERNAL MEDICINE
Payer: COMMERCIAL

## 2022-10-26 DIAGNOSIS — I48.11 LONGSTANDING PERSISTENT ATRIAL FIBRILLATION (H): ICD-10-CM

## 2022-10-26 LAB — LVEF ECHO: NORMAL

## 2022-10-26 PROCEDURE — 93306 TTE W/DOPPLER COMPLETE: CPT | Performed by: INTERNAL MEDICINE

## 2022-10-30 ENCOUNTER — HEALTH MAINTENANCE LETTER (OUTPATIENT)
Age: 52
End: 2022-10-30

## 2022-11-16 ENCOUNTER — VIRTUAL VISIT (OUTPATIENT)
Dept: CARDIOLOGY | Facility: CLINIC | Age: 52
End: 2022-11-16
Attending: INTERNAL MEDICINE
Payer: COMMERCIAL

## 2022-11-16 DIAGNOSIS — I10 HYPERTENSION, UNSPECIFIED TYPE: ICD-10-CM

## 2022-11-16 DIAGNOSIS — E78.5 DYSLIPIDEMIA: Primary | ICD-10-CM

## 2022-11-16 DIAGNOSIS — I48.11 LONGSTANDING PERSISTENT ATRIAL FIBRILLATION (H): ICD-10-CM

## 2022-11-16 PROCEDURE — 99215 OFFICE O/P EST HI 40 MIN: CPT | Mod: 95 | Performed by: INTERNAL MEDICINE

## 2022-11-16 NOTE — PROGRESS NOTES
Service Date: 2022    Samy Marcelino MD   [facility]  [address]  [place] [address]    RE:  Tremaine King  MRN: [id number]  : 1970    Dear Dr. Marcelino:    It was a pleasure participating in the care of your patient, Mr. Tremaine King.  As you know, he is a 52-year-old gentleman who I saw over virtual video visit via [employer] today for atrial fibrillation, prior mitral valve repair, hypertension.    His past medical history is significant for the followin.  Hypertension.  2.  Hyperlipidemia.  3.  Migraine headaches.  4.  Gout.  5.  Seasonal allergies with verbal history of mild asthma.  6.  Meniscus tear of the knee.  7.  History of morbid obesity with significant weight loss due to diet and exercise.  8.  Sleep apnea, treated with CPAP in .  9.  History of gastrointestinal bleed 2021.    As you recall, he had a coffee-ground emesis with black tarry stools with a hemoglobin of 6.6 11/10/2021.  EGD showed a 10 mm deep gastric ulcer that was cauterized and injected.  The ulcer was thought secondary to use of ibuprofen 600 mg a day along with alcohol use.    He has been followed for persistent atrial fibrillation.  His [id number]-[id number] score is 1 for hypertension only at this point and due to GI bleed, we have delayed any anticoagulant therapy.    I last saw him 2022.  At that time, he was doing adequately.  He presents today for continuing care.    Since our last visit, he has joined [employer] in July and he has been engaging in high intensity workouts 3 times a week.  He ran two 5K races already.  The first two him 45 minutes, the second took him 40 minutes.  He has lost weight down to 227 pounds now.  He feels good with exercise.  He denies chest pain, shortness of breath, PND, orthopnea, edema, palpitations, syncope or near syncope.  He is completely asymptomatic.    Blood pressures at home have been running steady around 122 mmHg.  He has no other complaints.       REVIEW OF SYSTEMS:  A 10-point review of systems is otherwise unremarkable.    SOCIAL HISTORY:  He is preparing for a 5K run on [employer].    CURRENT MEDICATIONS:    1.  Albuterol.  2.  Diltiazem 180 mg a day.  3.  Lasix 20 mg a day.  4.  Metoprolol succinate 150 mg a day.  5.  Sildenafil.    PHYSICAL EXAMINATION:    VITAL SIGNS:  His blood pressure is 123/73 with a pulse of 52.  His weight is 227 pounds.  GENERAL:  He appears comfortable, well groomed.  PSYCHIATRIC:  He is alert and oriented x3.  HEENT:  Eyes do not appear grossly erythematous or have exudate.  RESPIRATORY:  He is breathing comfortably without gross cough.    The remainder of the comprehensive physical exam was deferred secondary to the COVID-19 pandemic and secondary to video visit restrictions.    Labs 02/22/2022, , potassium 4.0, GFR normal.  Hemoglobin normal.    Echocardiogram 10/26/2022 reveals an ejection fraction of 60%-65%.  Mitral valve repair site mean gradient 6 at a heart rate of 52 beats per minute, mild MR.    A Zio patch monitor reveals 100% burden of AFib, average heart rate 58 beats per minute, pauses during sleeping hours, nonsustained VT.  No other arrhythmias.    IMPRESSION:  Tremaine is a 52-year-old gentleman who has several active cardiac issues:  1.  Persistent atrial fibrillation.  The patient has been in atrial fibrillation since 11/2021.  His AQW3ND3-JKFb score is currently 1 for hypertension only, however, he is also borderline diabetic and his mitral valve repair does show evidence of slightly increased gradient, which could contribute to slower flow within the left atrium.  He is quite a few months out from his GI bleed and has been doing well from that standpoint.  We did discuss anticoagulation today and he chooses to pursue half dose due to his prior history of GI bleed (with the interaction with diltiazem, the actual anticoagulant effect should be slightly greater).  He will continue to abstain from  alcohol and NSAIDs as well.  2.  History of mitral valve repair.  Mean gradient across the mitral valve of 6 at her heart rate of 52 beats per minute, relatively unchanged.  Continue to follow.   3.  Hypertension, well controlled.    Blood pressure is running in the low 120s.  Continue to follow.    PLAN:    1.  Start half dose Eliquis 2.5 mg twice daily (this with the interaction with drug diltiazem that he is already on, should provide a greater anticoagulant effect and with his history of serious GI bleed, we will monitor his clinical course closely.  2.  Virtual video visit followup in 6 months with labs prior, earlier if needed.    Once again, it was a pleasure participating in the care of your patient, Mr. Tremaine Horn.  Please feel free to contact me at any time if any questions regarding his care in the future.    Sincerely,    Tim Gotti MD        D: 2022   T: 2022   MT: EBONY    Name:     TREMAINE HORN  MRN:      [id number]        Account:      [id number]   :      1970           Service Date: 2022       Document: [id number]

## 2022-11-16 NOTE — PATIENT INSTRUCTIONS
November 16, 2022    Cardiology Provider You Saw During Your Visit: Dr. Gotti      Medication Changes: Start Eliquis 2.5 mg twice daily      Follow Up: Virtual followup visit in 6 months with fasting labs prior        Follow the American Heart Association Diet and Lifestyle Recommendations:  -Limit saturated fat, trans fat, sodium, red meat, sweets and sugar-sweetened beverages. If you choose to eat red meat, compare labels and select the leanest cuts available.  -Aim for at least 150 minutes of moderate physical activity or 75 minutes of vigorous physical activity - or an equal combination of both - each week.      To Reach Us:  -During business hours: [phone], press option # 1 to schedule an appointment or to leave a message for your care team.     -After hours, weekends or holidays: [phone], press option #4 and ask to speak to the on-call cardiologist. Inform them you are a patient at the [facility].      Judi Smart RN  Cardiology Care Coordinator - General Cardiology  [facility]

## 2022-11-16 NOTE — PROGRESS NOTES
"Tremaine is a 52 year old who is being evaluated via a billable video visit.      How would you like to obtain your AVS? MyChart  If the video visit is dropped, the invitation should be resent by: Send to e-mail at: [email or web address]  Will anyone else be joining your video visit? No    Mulugeta Lerner, Visit Facilitator/MA.    The patient has been notified of following:     \"This video visit will be conducted via a call between you and your physician/provider. We have found that certain health care needs can be provided without the need for an in-person physical exam.  This service lets us provide the care you need with a video conversation.  If a prescription is necessary we can send it directly to your pharmacy.  If lab work is needed we can place an order for that and you can then stop by our lab to have the test done at a later time.    Video visits are billed at different rates depending on your insurance coverage.  Please reach out to your insurance provider with any questions.    If during the course of the call the physician/provider feels a video visit is not appropriate, you will not be charged for this service.\"    Patient has given verbal consent for video visit? Yes    How would you like to obtain your AVS? Mail    Video-Visit Details    Type of service:  Video Visit    Video Start Time:858am    Video End Time:915am    Total visit time including video visit, chart review, charting, coordination of care =45min    Originating Location (pt. Location):patient home      Distant Location (provider location):   Off site home office    Platform used for Video Visit: Azra    See dictation #[id number]    [facility]#:[id number]    "

## 2022-11-16 NOTE — NURSING NOTE
Chief Complaint   Patient presents with     Follow Up     Annual follow up from Zio patch and echocardiogram. Medications/allergies reviewed with pt. No pt reported vitals today per pt     Patient denies any changes since echeck-in regarding medication and allergies and states all information entered during echeck-in remains accurate.    Mulugeta Lerner, Visit Facilitator/MA.

## 2022-11-16 NOTE — LETTER
2022      RE: Tremaine King  [address]  [facility] [address]     Service Date: 2022        Samy Marcelino MD   [facility]  [address]  [place] [address]    RE:  Tremaine King  MRN: [id number]  : 1970    Dear Dr. Marcelino:    It was a pleasure participating in the care of your patient, Mr. Tremaine King.  As you know, he is a 52-year-old gentleman who I saw over virtual video visit via [employer] today for atrial fibrillation, prior mitral valve repair, hypertension.    His past medical history is significant for the followin.  Hypertension.  2.  Hyperlipidemia.  3.  Migraine headaches.  4.  Gout.  5.  Seasonal allergies with verbal history of mild asthma.  6.  Meniscus tear of the knee.  7.  History of morbid obesity with significant weight loss due to diet and exercise.  8.  Sleep apnea, treated with CPAP in .  9.  History of gastrointestinal bleed 2021.    As you recall, he had a coffee-ground emesis with black tarry stools with a hemoglobin of 6.6 11/10/2021.  EGD showed a 10 mm deep gastric ulcer that was cauterized and injected.  The ulcer was thought secondary to use of ibuprofen 600 mg a day along with alcohol use.    He has been followed for persistent atrial fibrillation.  His [id number]-HHGa score is 1 for hypertension only at this point and due to GI bleed, we have delayed any anticoagulant therapy.    I last saw him 2022.  At that time, he was doing adequately.  He presents today for continuing care.    Since our last visit, he has joined "[employer] in July and he has been engaging in high intensity workouts 3 times a week.  He ran two 5K races already.  The first two him 45 minutes, the second took him 40 minutes.  He has lost weight down to 227 pounds now.  He feels good with exercise.  He denies chest pain, shortness of breath, PND, orthopnea, edema, palpitations, syncope or near syncope.  He is completely asymptomatic.    Blood  pressures at home have been running steady around 122 mmHg.  He has no other complaints.      REVIEW OF SYSTEMS:  A 10-point review of systems is otherwise unremarkable.    SOCIAL HISTORY:  He is preparing for a 5K run on [employer].    CURRENT MEDICATIONS:    1.  Albuterol.  2.  Diltiazem 180 mg a day.  3.  Lasix 20 mg a day.  4.  Metoprolol succinate 150 mg a day.  5.  Sildenafil.    PHYSICAL EXAMINATION:    VITAL SIGNS:  His blood pressure is 123/73 with a pulse of 52.  His weight is 227 pounds.  GENERAL:  He appears comfortable, well groomed.  PSYCHIATRIC:  He is alert and oriented x3.  HEENT:  Eyes do not appear grossly erythematous or have exudate.  RESPIRATORY:  He is breathing comfortably without gross cough.    The remainder of the comprehensive physical exam was deferred secondary to the COVID-19 pandemic and secondary to video visit restrictions.    Labs 02/22/2022, , potassium 4.0, GFR normal.  Hemoglobin normal.    Echocardiogram 10/26/2022 reveals an ejection fraction of 60%-65%.  Mitral valve repair site mean gradient 6 at a heart rate of 52 beats per minute, mild MR.    A Zio patch monitor reveals 100% burden of AFib, average heart rate 58 beats per minute, pauses during sleeping hours, nonsustained VT.  No other arrhythmias.    IMPRESSION:  Tremaine is a 52-year-old gentleman who has several active cardiac issues:  1.  Persistent atrial fibrillation.  The patient has been in atrial fibrillation since 11/2021.  His DZK6KL6-DGLt score is currently 1 for hypertension only, however, he is also borderline diabetic and his mitral valve repair does show evidence of slightly increased gradient, which could contribute to slower flow within the left atrium.  He is quite a few months out from his GI bleed and has been doing well from that standpoint.  We did discuss anticoagulation today and he chooses to pursue half dose due to his prior history of GI bleed (with the interaction with diltiazem, the  actual anticoagulant effect should be slightly greater).  He will continue to abstain from alcohol and NSAIDs as well.  2.  History of mitral valve repair.  Mean gradient across the mitral valve of 6 at her heart rate of 52 beats per minute, relatively unchanged.  Continue to follow.   3.  Hypertension, well controlled.    Blood pressure is running in the low 120s.  Continue to follow.    PLAN:    1.  Start half dose Eliquis 2.5 mg twice daily (this with the interaction with drug diltiazem that he is already on, should provide a greater anticoagulant effect and with his history of serious GI bleed, we will monitor his clinical course closely.  2.  Virtual video visit followup in 6 months with labs prior, earlier if needed.    Once again, it was a pleasure participating in the care of your patient, Mr. Tremaine Horn.  Please feel free to contact me at any time if any questions regarding his care in the future.    Sincerely,    Tim Gotti MD        D: 2022   T: 2022   MT: EBONY    Name:     TREMAINE HORN  MRN:      [id number]        Account:      [id number]   :      1970           Service Date: 2022       Document: [id number]

## 2022-11-16 NOTE — LETTER
"11/16/2022      RE: Tremaine King  [address]  [facility] [address]       Dear Colleague,    Thank you for the opportunity to participate in the care of your patient, Tremaine King, at the [facility] [place] [facility]. Please see a copy of my visit note below.    Tremaine is a 52 year old who is being evaluated via a billable video visit.      How would you like to obtain your AVS? MyChart  If the video visit is dropped, the invitation should be resent by: Send to e-mail at: [email or web address]  Will anyone else be joining your video visit? No    Mulugeta Lerner, Visit Brent/MA.    The patient has been notified of following:     \"This video visit will be conducted via a call between you and your physician/provider. We have found that certain health care needs can be provided without the need for an in-person physical exam.  This service lets us provide the care you need with a video conversation.  If a prescription is necessary we can send it directly to your pharmacy.  If lab work is needed we can place an order for that and you can then stop by our lab to have the test done at a later time.    Video visits are billed at different rates depending on your insurance coverage.  Please reach out to your insurance provider with any questions.    If during the course of the call the physician/provider feels a video visit is not appropriate, you will not be charged for this service.\"    Patient has given verbal consent for video visit? Yes    How would you like to obtain your AVS? Mail    Video-Visit Details    Type of service:  Video Visit    Video Start Time:858am    Video End Time:915am    Total visit time including video visit, chart review, charting, coordination of care =45min    Originating Location (pt. Location):patient home      Distant Location (provider location):   Off site home office    Platform used for Video Visit: " [employer]    See dictation #[id number]    CSN#:[id number]        Please do not hesitate to contact me if you have any questions/concerns.     Sincerely,     Tim Gotti MD

## 2022-12-05 ENCOUNTER — LAB (OUTPATIENT)
Dept: LAB | Facility: CLINIC | Age: 52
End: 2022-12-05
Payer: COMMERCIAL

## 2022-12-05 DIAGNOSIS — R73.9 HYPERGLYCEMIA: ICD-10-CM

## 2022-12-05 LAB — HBA1C MFR BLD: 11.8 % (ref 0–5.6)

## 2022-12-05 PROCEDURE — 36415 COLL VENOUS BLD VENIPUNCTURE: CPT

## 2022-12-05 PROCEDURE — 83036 HEMOGLOBIN GLYCOSYLATED A1C: CPT

## 2022-12-05 ASSESSMENT — ENCOUNTER SYMPTOMS
ARTHRALGIAS: 0
COUGH: 0
HEMATOCHEZIA: 0
CHILLS: 0
CONSTIPATION: 0
PARESTHESIAS: 0
DIZZINESS: 0
MYALGIAS: 0
HEARTBURN: 0
FREQUENCY: 0
HEADACHES: 0
SORE THROAT: 0
NERVOUS/ANXIOUS: 0
PALPITATIONS: 0
SHORTNESS OF BREATH: 0
NAUSEA: 0
FEVER: 0
ABDOMINAL PAIN: 0
DYSURIA: 0
WEAKNESS: 0
JOINT SWELLING: 0
EYE PAIN: 0
DIARRHEA: 0
HEMATURIA: 0

## 2022-12-05 ASSESSMENT — ASTHMA QUESTIONNAIRES
QUESTION_5 LAST FOUR WEEKS HOW WOULD YOU RATE YOUR ASTHMA CONTROL: COMPLETELY CONTROLLED
QUESTION_4 LAST FOUR WEEKS HOW OFTEN HAVE YOU USED YOUR RESCUE INHALER OR NEBULIZER MEDICATION (SUCH AS ALBUTEROL): ONCE A WEEK OR LESS
ACT_TOTALSCORE: 24
ACT_TOTALSCORE: 24
QUESTION_3 LAST FOUR WEEKS HOW OFTEN DID YOUR ASTHMA SYMPTOMS (WHEEZING, COUGHING, SHORTNESS OF BREATH, CHEST TIGHTNESS OR PAIN) WAKE YOU UP AT NIGHT OR EARLIER THAN USUAL IN THE MORNING: NOT AT ALL
QUESTION_1 LAST FOUR WEEKS HOW MUCH OF THE TIME DID YOUR ASTHMA KEEP YOU FROM GETTING AS MUCH DONE AT WORK, SCHOOL OR AT HOME: NONE OF THE TIME
QUESTION_2 LAST FOUR WEEKS HOW OFTEN HAVE YOU HAD SHORTNESS OF BREATH: NOT AT ALL

## 2022-12-09 ENCOUNTER — APPOINTMENT (OUTPATIENT)
Dept: LAB | Facility: CLINIC | Age: 52
End: 2022-12-09
Payer: COMMERCIAL

## 2022-12-09 ENCOUNTER — OFFICE VISIT (OUTPATIENT)
Dept: FAMILY MEDICINE | Facility: CLINIC | Age: 52
End: 2022-12-09
Payer: COMMERCIAL

## 2022-12-09 VITALS
WEIGHT: 226.5 LBS | HEIGHT: 71 IN | BODY MASS INDEX: 31.71 KG/M2 | OXYGEN SATURATION: 99 % | TEMPERATURE: 97.8 F | HEART RATE: 55 BPM | SYSTOLIC BLOOD PRESSURE: 124 MMHG | RESPIRATION RATE: 16 BRPM | DIASTOLIC BLOOD PRESSURE: 74 MMHG

## 2022-12-09 DIAGNOSIS — Z00.01 ENCOUNTER FOR PREVENTATIVE ADULT HEALTH CARE EXAM WITH ABNORMAL FINDINGS: Primary | ICD-10-CM

## 2022-12-09 DIAGNOSIS — F43.9 STRESS: ICD-10-CM

## 2022-12-09 DIAGNOSIS — I10 ESSENTIAL HYPERTENSION WITH GOAL BLOOD PRESSURE LESS THAN 140/90: ICD-10-CM

## 2022-12-09 DIAGNOSIS — Z13.6 CARDIOVASCULAR SCREENING; LDL GOAL LESS THAN 130: ICD-10-CM

## 2022-12-09 DIAGNOSIS — M10.00 IDIOPATHIC GOUT, UNSPECIFIED CHRONICITY, UNSPECIFIED SITE: ICD-10-CM

## 2022-12-09 DIAGNOSIS — J45.30 MILD PERSISTENT ASTHMA WITHOUT COMPLICATION: ICD-10-CM

## 2022-12-09 DIAGNOSIS — Z79.01 LONG TERM CURRENT USE OF ANTICOAGULANT THERAPY: ICD-10-CM

## 2022-12-09 DIAGNOSIS — F10.10 ALCOHOL ABUSE: ICD-10-CM

## 2022-12-09 DIAGNOSIS — E11.9 TYPE 2 DIABETES MELLITUS WITHOUT COMPLICATION, WITHOUT LONG-TERM CURRENT USE OF INSULIN (H): ICD-10-CM

## 2022-12-09 LAB
ANION GAP SERPL CALCULATED.3IONS-SCNC: 14 MMOL/L (ref 7–15)
BUN SERPL-MCNC: 17.2 MG/DL (ref 6–20)
CALCIUM SERPL-MCNC: 10.3 MG/DL (ref 8.6–10)
CHLORIDE SERPL-SCNC: 95 MMOL/L (ref 98–107)
CREAT SERPL-MCNC: 1.08 MG/DL (ref 0.67–1.17)
CREAT UR-MCNC: 25.2 MG/DL
DEPRECATED HCO3 PLAS-SCNC: 27 MMOL/L (ref 22–29)
ERYTHROCYTE [DISTWIDTH] IN BLOOD BY AUTOMATED COUNT: 12.3 % (ref 10–15)
GFR SERPL CREATININE-BSD FRML MDRD: 82 ML/MIN/1.73M2
GLUCOSE SERPL-MCNC: 220 MG/DL (ref 70–99)
HCT VFR BLD AUTO: 42.3 % (ref 40–53)
HGB BLD-MCNC: 15.2 G/DL (ref 13.3–17.7)
MCH RBC QN AUTO: 30.2 PG (ref 26.5–33)
MCHC RBC AUTO-ENTMCNC: 35.9 G/DL (ref 31.5–36.5)
MCV RBC AUTO: 84 FL (ref 78–100)
MICROALBUMIN UR-MCNC: <12 MG/L
MICROALBUMIN/CREAT UR: NORMAL MG/G{CREAT}
PLATELET # BLD AUTO: 222 10E3/UL (ref 150–450)
POTASSIUM SERPL-SCNC: 3.9 MMOL/L (ref 3.4–5.3)
RBC # BLD AUTO: 5.04 10E6/UL (ref 4.4–5.9)
SODIUM SERPL-SCNC: 136 MMOL/L (ref 136–145)
URATE SERPL-MCNC: 6.4 MG/DL (ref 3.4–7)
WBC # BLD AUTO: 9.4 10E3/UL (ref 4–11)

## 2022-12-09 PROCEDURE — 99396 PREV VISIT EST AGE 40-64: CPT | Mod: 25 | Performed by: FAMILY MEDICINE

## 2022-12-09 PROCEDURE — 80048 BASIC METABOLIC PNL TOTAL CA: CPT | Performed by: FAMILY MEDICINE

## 2022-12-09 PROCEDURE — 82043 UR ALBUMIN QUANTITATIVE: CPT | Performed by: FAMILY MEDICINE

## 2022-12-09 PROCEDURE — 90471 IMMUNIZATION ADMIN: CPT | Performed by: FAMILY MEDICINE

## 2022-12-09 PROCEDURE — 90715 TDAP VACCINE 7 YRS/> IM: CPT | Performed by: FAMILY MEDICINE

## 2022-12-09 PROCEDURE — 36415 COLL VENOUS BLD VENIPUNCTURE: CPT | Performed by: FAMILY MEDICINE

## 2022-12-09 PROCEDURE — 84550 ASSAY OF BLOOD/URIC ACID: CPT | Performed by: FAMILY MEDICINE

## 2022-12-09 PROCEDURE — 99214 OFFICE O/P EST MOD 30 MIN: CPT | Mod: 25 | Performed by: FAMILY MEDICINE

## 2022-12-09 PROCEDURE — 90472 IMMUNIZATION ADMIN EACH ADD: CPT | Performed by: FAMILY MEDICINE

## 2022-12-09 PROCEDURE — 90677 PCV20 VACCINE IM: CPT | Performed by: FAMILY MEDICINE

## 2022-12-09 PROCEDURE — 85027 COMPLETE CBC AUTOMATED: CPT | Performed by: FAMILY MEDICINE

## 2022-12-09 RX ORDER — ALLOPURINOL 300 MG/1
1 TABLET ORAL DAILY
Qty: 90 TABLET | Refills: 3 | Status: SHIPPED | OUTPATIENT
Start: 2022-12-09 | End: 2023-11-27

## 2022-12-09 ASSESSMENT — ENCOUNTER SYMPTOMS
SORE THROAT: 0
COUGH: 0
ABDOMINAL PAIN: 0
HEMATURIA: 0
WEAKNESS: 0
HEARTBURN: 0
HEMATOCHEZIA: 0
EYE PAIN: 0
CONSTIPATION: 0
PARESTHESIAS: 0
SHORTNESS OF BREATH: 0
DYSURIA: 0
NERVOUS/ANXIOUS: 0
ARTHRALGIAS: 0
FREQUENCY: 0
MYALGIAS: 0
JOINT SWELLING: 0
FEVER: 0
CHILLS: 0
PALPITATIONS: 0
DIZZINESS: 0
NAUSEA: 0
HEADACHES: 0
DIARRHEA: 0

## 2022-12-09 ASSESSMENT — PAIN SCALES - GENERAL: PAINLEVEL: NO PAIN (0)

## 2022-12-09 ASSESSMENT — PATIENT HEALTH QUESTIONNAIRE - PHQ9
SUM OF ALL RESPONSES TO PHQ QUESTIONS 1-9: 0
SUM OF ALL RESPONSES TO PHQ QUESTIONS 1-9: 0
10. IF YOU CHECKED OFF ANY PROBLEMS, HOW DIFFICULT HAVE THESE PROBLEMS MADE IT FOR YOU TO DO YOUR WORK, TAKE CARE OF THINGS AT HOME, OR GET ALONG WITH OTHER PEOPLE: NOT DIFFICULT AT ALL

## 2022-12-09 NOTE — PROGRESS NOTES
SUBJECTIVE:   CC: Tremaine is an 52 year old who presents for preventative health visit.     Patient has been advised of split billing requirements and indicates understanding: Yes  Healthy Habits:     Getting at least 3 servings of Calcium per day:  Yes    Bi-annual eye exam:  Yes    Dental care twice a year:  NO    Sleep apnea or symptoms of sleep apnea:  Sleep apnea    Diet:  Regular (no restrictions)    Frequency of exercise:  2-3 days/week    Duration of exercise:  45-60 minutes    Taking medications regularly:  Yes    Medication side effects:  None    PHQ-2 Total Score: 0    Additional concerns today:  Yes    Diabetes- needs referral to learn bg checks  Asthma- at goal on MDIs  Alcohol abuse- sober, no AA, if stress worse will consider therapy, selective serotonin reuptake inhibitor  Anti-coag for PAF  Gout is at goal with allopurinol     Today's PHQ-2 Score:   PHQ-2 (  [employer]) 2022   Q1: Little interest or pleasure in doing things 0   Q2: Feeling down, depressed or hopeless 0   PHQ-2 Score 0   PHQ-2 Total Score (12-17 Years)- Positive if 3 or more points; Administer PHQ-A if positive -   Q1: Little interest or pleasure in doing things Not at all   Q2: Feeling down, depressed or hopeless Not at all   PHQ-2 Score 0     Social History     Tobacco Use     Smoking status: Former     Types: Cigars     Quit date: 1989     Years since quittin.9     Smokeless tobacco: Never     Tobacco comments:     quit smoking cigarettes and cigars at ~ age 22 (was light smoker for about 4 years))   Substance Use Topics     Alcohol use: Not Currently     Comment: since ulcer 2021       Alcohol Use 2022   Prescreen: >3 drinks/day or >7 drinks/week? No   Prescreen: >3 drinks/day or >7 drinks/week? -     Last PSA:   PSA   Date Value Ref Range Status   2020 2.35 0 - 4 ug/L Final     Comment:     Assay Method:  Chemiluminescence using Siemens Vista analyzer       Reviewed orders with patient. Reviewed health  "maintenance and updated orders accordingly - Yes  Lab work is in process  Labs reviewed in EPIC    Reviewed and updated as needed this visit by clinical staff   Tobacco  Allergies  Meds  Problems             Reviewed and updated as needed this visit by Provider      Problems                Review of Systems   Constitutional: Negative for chills and fever.   HENT: Negative for congestion, ear pain, hearing loss and sore throat.    Eyes: Negative for pain and visual disturbance.   Respiratory: Negative for cough and shortness of breath.    Cardiovascular: Negative for chest pain, palpitations and peripheral edema.   Gastrointestinal: Negative for abdominal pain, constipation, diarrhea, heartburn, hematochezia and nausea.   Genitourinary: Negative for dysuria, frequency, genital sores, hematuria, impotence, penile discharge and urgency.   Musculoskeletal: Negative for arthralgias, joint swelling and myalgias.   Skin: Negative for rash.   Neurological: Negative for dizziness, weakness, headaches and paresthesias.   Psychiatric/Behavioral: Negative for mood changes. The patient is not nervous/anxious.      OBJECTIVE:   /74   Pulse 55   Temp 97.8  F (36.6  C) (Temporal)   Resp 16   Ht 1.803 m (5' 10.98\")   Wt 102.7 kg (226 lb 8 oz)   SpO2 99%   BMI 31.60 kg/m      Physical Exam  GENERAL: healthy, alert and no distress  EYES: Eyes grossly normal to inspection, PERRL and conjunctivae and sclerae normal  HENT: ear canals and TM's normal, nose and mouth without ulcers or lesions  NECK: no adenopathy, no asymmetry, masses, or scars and thyroid normal to palpation  RESP: lungs clear to auscultation - no rales, rhonchi or wheezes  CV: regular rate and rhythm, normal S1 S2, no S3 or S4, no murmur, click or rub, no peripheral edema and peripheral pulses strong  ABDOMEN: soft, nontender, no hepatosplenomegaly, no masses and bowel sounds normal   (male): normal male genitalia without lesions or urethral " "discharge, no hernia  RECTAL: normal sphincter tone, no rectal masses, prostate normal size, smooth, nontender without nodules or masses  MS: no gross musculoskeletal defects noted, no edema  SKIN: no suspicious lesions or rashes  NEURO: Normal strength and tone, mentation intact and speech normal  PSYCH: mentation appears normal, affect normal/bright    Diagnostic Test Results:  Labs reviewed in Epic    ASSESSMENT/PLAN:       ICD-10-CM    1. Encounter for preventative adult health care exam with abnormal findings  Z00.01       2. Type 2 diabetes mellitus without complication, without long-term current use of insulin (H)  E11.9 AMB Adult Diabetes Educator Referral     metFORMIN (GLUCOPHAGE) 500 MG tablet     Albumin Random Urine Quantitative with Creat Ratio     Albumin Random Urine Quantitative with Creat Ratio      3. Essential hypertension with goal blood pressure less than 140/90  I10       4. CARDIOVASCULAR SCREENING; LDL GOAL LESS THAN 130  Z13.6       5. Mild persistent asthma without complication  J45.30 beclomethasone HFA (QVAR REDIHALER) 80 MCG/ACT inhaler      6. Alcohol abuse  F10.10 Adult Mental Health  Referral      7. Stress  F43.9       8. Idiopathic gout, unspecified chronicity, unspecified site  M10.00 allopurinol (ZYLOPRIM) 300 MG tablet     Uric acid     CBC with platelets     Basic metabolic panel  (Ca, Cl, CO2, Creat, Gluc, K, Na, BUN)     Uric acid     CBC with platelets     Basic metabolic panel  (Ca, Cl, CO2, Creat, Gluc, K, Na, BUN)      9. Long term current use of anticoagulant therapy  Z79.01         COUNSELING:   Reviewed preventive health counseling, as reflected in patient instructions       Regular exercise       Healthy diet/nutrition       Vision screening       Colorectal cancer screening       Prostate cancer screening      BMI:   Estimated body mass index is 31.6 kg/m  as calculated from the following:    Height as of this encounter: 1.803 m (5' 10.98\").    Weight as of " this encounter: 102.7 kg (226 lb 8 oz).   Weight management plan: Discussed healthy diet and exercise guidelines      He reports that he quit smoking about 33 years ago. His smoking use included cigars. He has never used smokeless tobacco.    Patient Instructions   The 10-year ASCVD risk score ([place] ARCE, et al., 2019) is: 9.7%    Values used to calculate the score:      Age: 52 years      Sex: Male      Is Non- : No      Diabetic: Yes      Tobacco smoker: No      Systolic Blood Pressure: 124 mmHg      Is BP treated: Yes      HDL Cholesterol: 45 mg/dL      Total Cholesterol: 171 mg/dL    Samy Marcelino MD  [facility]  Answers for HPI/ROS submitted by the patient on 12/9/2022  If you checked off any problems, how difficult have these problems made it for you to do your work, take care of things at home, or get along with other people?: Not difficult at all  PHQ9 TOTAL SCORE: 0

## 2022-12-15 PROBLEM — M51.26 LUMBAR DISC HERNIATION: Status: RESOLVED | Noted: 2018-05-01 | Resolved: 2022-12-15

## 2022-12-16 NOTE — PATIENT INSTRUCTIONS
The 10-year ASCVD risk score ([place] ARCE, et al., 2019) is: 9.7%    Values used to calculate the score:      Age: 52 years      Sex: Male      Is Non- : No      Diabetic: Yes      Tobacco smoker: No      Systolic Blood Pressure: 124 mmHg      Is BP treated: Yes      HDL Cholesterol: 45 mg/dL      Total Cholesterol: 171 mg/dL    Contact prn therapy, selective serotonin reuptake inhibitor, unable to maintain sobriety

## 2022-12-18 DIAGNOSIS — E83.52 SERUM CALCIUM ELEVATED: Primary | ICD-10-CM

## 2022-12-18 NOTE — RESULT ENCOUNTER NOTE
Shree Penaloza, your recent results-  -uric acid normal; continue allopurinol  -chemistries including blood/urine kidney function look good, chloride not worrisome. Nonfasting glucose not at goal <180.  -calcium is now elevated. Can be due to furosemide, however recommend you schedule nonfasting lab only to repeat calcium, and check parathyroid      Please contact if any questions.   Samy KNOWLES

## 2022-12-19 NOTE — RESULT ENCOUNTER NOTE
Shree Penaloza: As A1c 11.3 (goal <7.0) recommend 3 month followup appointment. Hope the diabetes appointment is helpful; let me know if problems with metformin.     Samy KNOWLES

## 2022-12-22 ENCOUNTER — TELEPHONE (OUTPATIENT)
Dept: EDUCATION SERVICES | Facility: CLINIC | Age: 52
End: 2022-12-22

## 2022-12-22 DIAGNOSIS — E11.65 TYPE 2 DIABETES MELLITUS WITH HYPERGLYCEMIA (H): Primary | ICD-10-CM

## 2022-12-22 DIAGNOSIS — E11.65 TYPE 2 DIABETES MELLITUS WITH HYPERGLYCEMIA, WITHOUT LONG-TERM CURRENT USE OF INSULIN (H): ICD-10-CM

## 2022-12-22 RX ORDER — PEN NEEDLE, DIABETIC 31 GX5/16"
NEEDLE, DISPOSABLE MISCELLANEOUS
Qty: 100 EACH | Refills: 3 | Status: SHIPPED | OUTPATIENT
Start: 2022-12-22 | End: 2022-12-28

## 2022-12-22 RX ORDER — INSULIN GLARGINE 300 U/ML
20 INJECTION, SOLUTION SUBCUTANEOUS AT BEDTIME
Qty: 3 ML | Refills: 2 | Status: CANCELLED | OUTPATIENT
Start: 2022-12-22

## 2022-12-22 RX ORDER — INSULIN GLARGINE-YFGN 100 [IU]/ML
10 INJECTION, SOLUTION SUBCUTANEOUS AT BEDTIME
Qty: 15 ML | Refills: 1 | Status: SHIPPED | OUTPATIENT
Start: 2022-12-22 | End: 2023-01-13

## 2022-12-22 RX ORDER — GLUCOSAMINE HCL/CHONDROITIN SU 500-400 MG
CAPSULE ORAL
Qty: 100 EACH | Refills: 3 | Status: SHIPPED | OUTPATIENT
Start: 2022-12-22 | End: 2023-06-05

## 2022-12-22 NOTE — TELEPHONE ENCOUNTER
Called patient and he said he is open to the idea of doing a daily insulin injection.    Chu Carey RN   [facility]

## 2022-12-23 ENCOUNTER — MYC MEDICAL ADVICE (OUTPATIENT)
Dept: EDUCATION SERVICES | Facility: CLINIC | Age: 52
End: 2022-12-23

## 2022-12-23 ENCOUNTER — VIRTUAL VISIT (OUTPATIENT)
Dept: EDUCATION SERVICES | Facility: CLINIC | Age: 52
End: 2022-12-23
Attending: FAMILY MEDICINE
Payer: COMMERCIAL

## 2022-12-23 DIAGNOSIS — E11.9 TYPE 2 DIABETES MELLITUS WITHOUT COMPLICATION, WITHOUT LONG-TERM CURRENT USE OF INSULIN (H): Primary | ICD-10-CM

## 2022-12-23 DIAGNOSIS — E11.65 TYPE 2 DIABETES MELLITUS WITH HYPERGLYCEMIA (H): Primary | ICD-10-CM

## 2022-12-23 PROCEDURE — G0108 DIAB MANAGE TRN  PER INDIV: HCPCS | Mod: TEL | Performed by: DIETITIAN, REGISTERED

## 2022-12-23 RX ORDER — INSULIN GLARGINE 300 U/ML
20 INJECTION, SOLUTION SUBCUTANEOUS AT BEDTIME
Qty: 3 ML | Refills: 3 | Status: SHIPPED | OUTPATIENT
Start: 2022-12-23 | End: 2022-12-28

## 2022-12-23 RX ORDER — BLOOD-GLUCOSE SENSOR
1 EACH MISCELLANEOUS
Qty: 2 EACH | Refills: 11 | Status: SHIPPED | OUTPATIENT
Start: 2022-12-23 | End: 2023-11-13

## 2022-12-23 NOTE — PROGRESS NOTES
"Diabetes Self-Management Education & Support    Presents for: Initial Assessment for new diagnosis    Type of Service: Telephone Visit    Originating Location (Patient Location): Home  Distant Location (Provider Location): [facility]  Mode of Communication:  Telephone    Telephone Visit Start Time: 8:30  Telephone Visit End Time (telephone visit stop time): 9:31    How would patient like to obtain AVS? Dang    Assessment Type:   ASSESSMENT:  Tremaine says he was \"just waiting for the diagnosis\", his mom and nearly everyone in her family have T2D. He describes the women are thin, but the men are \"egg shaped\" and says that applies to him.  He is willing to take insulin if needed, interested in a GCM. Describes himself as a \"data nerd\" and would like to track trends this way.    Patient's most recent   Lab Results   Component Value Date    A1C 11.8 12/05/2022     is not meeting goal of <7.0    Diabetes knowledge and skills assessment:   Patient is knowledgeable in diabetes management concepts related to: needs comprehensive ed for new dx    Continue education with the following diabetes management concepts: Healthy Eating, Being Active, Monitoring, Taking Medication, Problem Solving, Reducing Risks and Healthy Coping    Based on learning assessment above, most appropriate setting for further diabetes education would be: Individual setting.      PLAN  1) Begin monitoring blood glucose using Sumanth 3 CGM and nohelia  2) Requested insulin from doctor, recommend start Toujeo insulin at 20 units daily   3) After BG at target in the future, consider change to GLP-1    Topics to cover at upcoming visits: Being Active, Monitoring and Taking Medication    Follow-up: 1/13    See Care Plan for co-developed, patient-state behavior change goals.  AVS provided for patient today.    Education Materials Provided:  Carbohydrate Counting, Hypoglycemia Signs and Symptoms and PCRM Recipes for Success & Rx " "graphic      SUBJECTIVE/OBJECTIVE:  Presents for: Initial Assessment for new diagnosis  Accompanied by: Self  Diabetes education in the past 24mo: No (initial ed)  Diabetes type: Type 2  Date of diagnosis: Dec 2022  Diabetes management related comments/concerns: nothing off the top of my head  Difficulty affording diabetes medication?: No (taking 500 BID)  Other concerns:: None  Cultural Influences/Ethnic Background:  Not  or     Diabetes Symptoms & Complications:  Neuropathy: Sometimes (some - might be associated with slipped disc but feels like they are asleep)  Visual change: Sometimes (maybe)       Patient Problem List and Family Medical History reviewed for relevant medical history, current medical status, and diabetes risk factors.    Vitals:  There were no vitals taken for this visit.  Estimated body mass index is 31.6 kg/m  as calculated from the following:    Height as of 12/9/22: 1.803 m (5' 10.98\").    Weight as of 12/9/22: 102.7 kg (226 lb 8 oz).   Last 3 BP:   BP Readings from Last 3 Encounters:   12/09/22 124/74   01/11/22 112/76   11/12/21 104/52       History   Smoking Status     Former     Types: Cigars   Smokeless Tobacco     Never       Labs:  Lab Results   Component Value Date    A1C 11.8 12/05/2022     Lab Results   Component Value Date     12/09/2022     02/22/2022     11/09/2020     Lab Results   Component Value Date     02/22/2022     11/09/2020     HDL Cholesterol   Date Value Ref Range Status   11/09/2020 51 >39 mg/dL Final     Direct Measure HDL   Date Value Ref Range Status   02/22/2022 45 >=40 mg/dL Final   ]  GFR Estimate   Date Value Ref Range Status   12/09/2022 82 >60 mL/min/1.73m2 Final     Comment:     Effective December 21, 2021 eGFRcr in adults is calculated using the 2021 CKD-EPI creatinine equation which includes age and gender (Gayathri chan al., NEJM, DOI: 10.1056/WPTGmi3518445)   11/09/2020 73 >60 mL/min/[1.73_m2] Final     " Comment:     Non  GFR Calc  Starting 12/18/2018, serum creatinine based estimated GFR (eGFR) will be   calculated using the Chronic Kidney Disease Epidemiology Collaboration   (CKD-EPI) equation.       GFR Estimate If Black   Date Value Ref Range Status   11/09/2020 84 >60 mL/min/[1.73_m2] Final     Comment:      GFR Calc  Starting 12/18/2018, serum creatinine based estimated GFR (eGFR) will be   calculated using the Chronic Kidney Disease Epidemiology Collaboration   (CKD-EPI) equation.       Lab Results   Component Value Date    CR 1.08 12/09/2022    CR 1.15 11/09/2020     No results found for: MICROALBUMIN    Healthy Eating:  Healthy Eating Assessed Today: Yes (has trying to lose weight - felt successful but then realized it's because of DB- trying to go slowly, not drastic)  Meal planning/habits: Calorie counting, Other (uses an nohelia chronometer, trying to eat more healthy not necessarily less)  How many times a week on average do you eat food made away from home (restaurant/take-out)?: 1 (trying to reduce, 1-2x/week)  Meals include: Breakfast, Lunch, Dinner (eating more veg, fruit, fiber, complex carb, whole; reading labels, shares cooking and shopping)  Breakfast: usually light meal- yesterday 2 mandarine oranges  a heavy breakfast would be granola and yogurt or 2 toast with butter + 1-2 cups coffee  Lunch: turkey and cheese sandwich (multi grain bread) broiled open face OR salad (lettuce, deli meat, vinegrette dressing ~2 TBS, + random leftover veg)  Dinner: yesterday comvfort food: mac n cneese with hot dog OR pasta wtih home made sauce (veggies, tomatoes) & veggies like broccoli or green beans, protein  Snacks: chex mix (holiday food)  Beverages: Tea, Coffee (green tea, non-alcoholic beer or cider drinks)  Has patient met with a dietitian in the past?: No (has done some reading online- doesn't want to do a fad diet)    Being Active:  Being Active Assessed Today:  No    Monitoring:  Monitoring Assessed Today: Yes  Did patient bring glucose meter to appointment? : No (does not have yet)  Blood Glucose Meter: CGM (ordered Sumanth 3)      Taking Medications:  Diabetes Medication(s)     Biguanides       metFORMIN (GLUCOPHAGE) 500 MG tablet    Take 1 tablet (500 mg) by mouth 2 times daily (with meals)    Insulin       Insulin Glargine-yfgn (SEMGLEE, YFGN,) 100 UNIT/ML SOPN    Inject 10 Units Subcutaneous At Bedtime          Taking Medication Assessed Today: Yes  Current Treatments: Oral Medication (taken by mouth) (Metformin 500 BID, he is willing to start insulin per my recommendation)  Problems taking diabetes medications regularly?: No  Diabetes medication side effects?: No    Problem Solving:  Problem Solving Assessed Today: Yes  Is the patient at risk for hypoglycemia?: No (but we did discuss with insulin initiation- desiree kessler)  Does patient have severe weather/disaster plan for diabetes management?: Not Needed  Does patient have sick day plan for diabetes management?: Not Needed    Reducing Risks:  Reducing Risks Assessed Today: Yes  Diabetes Risks: Age over 45 years, Family History  CAD Risks: Diabetes Mellitus, Family history, Hypertension, Male sex, Obesity    Healthy Coping:  Healthy Coping Assessed Today: Yes  Emotional response to diabetes: Ready to learn, Acceptance, Confidence diabetes can be controlled  Informal Support system:: Spouse  Stage of change: PREPARATION (Decided to change - considering how)  Patient Activation Measure Survey Score:  No flowsheet data found.    Care Plan and Education Provided:  Care Plan: Diabetes   Updates made by Kathy Russo RD since 12/23/2022 12:00 AM      Problem: HbA1C Not In Goal       Goal: Establish Regular Follow-Ups with PCP       Task: Discuss with PCP the recommended timing for patient's next follow up visit(s)    Responsible User: Kathy Russo RD      Task: Discuss schedule for PCP visits with patient    Responsible  User: Kathy Russo RD      Goal: Get HbA1C Level in Goal       Task: Educate patient on diabetes education self-management topics    Responsible User: Kathy Russo RD      Task: Educate patient on benefits of regular glucose monitoring    Responsible User: Kathy Russo RD      Task: Refer patient to appropriate extended care team member, as needed (Medication Therapy Management, Behavioral Health, Physical Therapy, etc.)    Responsible User: Kathy Russo RD      Task: Discuss diabetes treatment plan with patient    Responsible User: Kathy Russo RD      Problem: Diabetes Self-Management Education Needed to Optimize Self-Care Behaviors       Goal: Understand diabetes pathophysiology and disease progression       Task: Provide education on diabetes pathophysiology and disease progression specfic to patient's diabetes type Completed 12/23/2022   Responsible User: Kathy Russo RD      Goal: Healthy Eating - follow a healthy eating pattern for diabetes       Task: Provide education on portion control and consistency in amount, composition and timing of food intake Completed 12/23/2022   Responsible User: Kathy Russo RD      Task: Provide education on managing carbohydrate intake (carbohydrate counting, plate planning method, etc.) Completed 12/23/2022   Responsible User: Kathy Russo RD      Task: Provide education on weight management Completed 12/23/2022   Responsible User: Kathy Russo RD      Task: Provide education on heart healthy eating    Responsible User: Kathy Russo RD      Task: Provide education on eating out    Responsible User: Ktahy Russo RD      Task: Develop individualized healthy eating plan with patient    Responsible User: Kathy Russo RD      Goal: Being Active - get regular physical activity, working up to at least 150 minutes per week       Task: Provide education on relationship of activity to glucose and precautions to  take if at risk for low glucose    Responsible User: Kathy Russo RD      Task: Discuss barriers to physical activity with patient    Responsible User: Kathy Russo RD      Task: Develop physical activity plan with patient    Responsible User: Kathy Russo RD      Task: Explore community resources including walking groups, assistance programs, and home videos    Responsible User: Kathy Russo RD      Goal: Monitoring - monitor glucose and ketones as directed    This Visit's Progress: 0%   Note:    Begin monitoring blood glucose with Sumanth 3     Task: Provide education on blood glucose monitoring (purpose, proper technique, frequency, glucose targets, interpreting results, when to use glucose control solution, sharps disposal)    Responsible User: Kathy Russo RD      Task: Provide education on continuous glucose monitoring (sensor placement, use of nohelia or /reader, understanding glucose trends, alerts and alarms, differences between sensor glucose and blood glucose) Completed 12/23/2022   Responsible User: Kathy Russo RD      Task: Provide education on ketone monitoring (when to monitor, frequency, etc.)    Responsible User: Kathy Russo RD      Goal: Taking Medication - patient is consistently taking medications as directed    This Visit's Progress: 0%   Note:    Initiate additional diabetes meds (insulin) as prescribed     Task: Provide education on action of prescribed medication, including when to take and possible side effects Completed 12/23/2022   Responsible User: Kathy Russo RD      Task: Provide education on insulin and injectable diabetes medications, including administration, storage, site selection and rotation for injection sites Completed 12/23/2022   Responsible User: Kathy Russo RD      Task: Discuss barriers to medication adherence with patient and provide management technique ideas as appropriate    Responsible User: Karan  VICKY Chavez      Task: Provide education on frequency and refill details of medications    Responsible User: Kathy Russo RD      Goal: Problem Solving - know how to prevent and manage short-term diabetes complications       Task: Provide education on high blood glucose - causes, signs/symptoms, prevention and treatment Completed 12/23/2022   Responsible User: Kathy Russo RD      Task: Provide education on low blood glucose - causes, signs/symptoms, prevention, treatment, carrying a carbohydrate source at all times, and medical identification Completed 12/23/2022   Responsible User: Kathy Russo RD      Task: Provide education on safe travel with diabetes    Responsible User: Kathy Russo RD      Task: Provide education on how to care for diabetes on sick days    Responsible User: Kathy Russo RD      Task: Provide education on when to call a health care provider    Responsible User: Kathy Russo RD      Goal: Reducing Risks - know how to prevent and treat long-term diabetes complications       Task: Provide education on major complications of diabetes, prevention, early diagnostic measures and treatment of complications Completed 12/23/2022   Responsible User: Kathy Russo RD      Task: Provide education on recommended care for dental, eye and foot health    Responsible User: Kathy Russo RD      Task: Provide education on Hemoglobin A1c - goals and relationship to blood glucose levels Completed 12/23/2022   Responsible User: Kathy Russo RD      Task: Provide education on recommendations for heart health - lipid levels and goals, blood pressure and goals, and aspirin therapy, if indicated    Responsible User: Kathy Russo RD      Task: Provide education on tobacco cessation    Responsible User: Kathy Russo RD      Goal: Healthy Coping - use available resources to cope with the challenges of managing diabetes       Task: Discuss recognizing  feelings about having diabetes Completed 12/23/2022   Responsible User: Kathy Russo RD      Task: Provide education on the benefits of making appropriate lifestyle changes Completed 12/23/2022   Responsible User: Kathy Russo RD      Task: Provide education on benefits of utilizing support systems    Responsible User: Kathy Russo RD      Task: Discuss methods for coping with stress    Responsible User: Kathy Russo RD      Task: Provide education on when to seek professional counseling    Responsible User: Kathy Russo RD          Have been in touch with Dr. Marcelino's team while he is out re: placing insulin orders.    Emailed Tremaine with his permission:  Shree Penaloza,   Nice to visit with you this morning!  I'll attach a few resources I mentioned here and you can take a look, just let me know if you have any questions ??    I sent the order to pharmacy for the [place] and have spoken to Dr. Marcelino's nurse about the insulin- doc is out of office but I've routed the order as urgent to the covering provider. I'll keep an eye out for the order getting signed off and send a surespot message if/when I see that.    When you look at the Sumanth 3 nohelia, there is a dropdown that has an option to allow me to look at the data in between visits so we can adjust insulin via surespot communication.  If you're comfortable with that, in the dropdown there is an option called something like connected apps or connect to a practice- if you enter the [place] diabetes practice ID, then I can look next week and adjust med as needed.  Our practice ID is [id number].  Suzanne Russo RD, LD, [facility]    Time Spent: 60 minutes  Encounter Type: Individual    Any diabetes medication dose changes were made via the CDE Protocol per the patient's referring provider. A copy of this encounter was shared with the provider.

## 2022-12-23 NOTE — TELEPHONE ENCOUNTER
Patient with A1C 11.8%, ready to start insulin (per diabetes care protocol).   He is agreeable to the plan.     Pending order for insulin, requesting sign off so that he can get started.   Kathy Russo RD, LD, [facility]

## 2022-12-23 NOTE — LETTER
"    12/23/2022         RE: Tremaine King  [address]  [facility] [address]        Dear Colleague,    Thank you for referring your patient, Tremaine King, to the [facility]. Please see a copy of my visit note below.    Diabetes Self-Management Education & Support    Presents for: Initial Assessment for new diagnosis    Type of Service: Telephone Visit    Originating Location (Patient Location): Home  Distant Location (Provider Location): [facility]  Mode of Communication:  Telephone    Telephone Visit Start Time: 8:30  Telephone Visit End Time (telephone visit stop time): 9:31    How would patient like to obtain AVS? MyChart    Assessment Type:   ASSESSMENT:  Tremaine says he was \"just waiting for the diagnosis\", his mom and nearly everyone in her family have T2D. He describes the women are thin, but the men are \"egg shaped\" and says that applies to him.  He is willing to take insulin if needed, interested in a GCM. Describes himself as a \"data nerd\" and would like to track trends this way.    Patient's most recent   Lab Results   Component Value Date    A1C 11.8 12/05/2022     is not meeting goal of <7.0    Diabetes knowledge and skills assessment:   Patient is knowledgeable in diabetes management concepts related to: needs comprehensive ed for new dx    Continue education with the following diabetes management concepts: Healthy Eating, Being Active, Monitoring, Taking Medication, Problem Solving, Reducing Risks and Healthy Coping    Based on learning assessment above, most appropriate setting for further diabetes education would be: Individual setting.      PLAN  1) Begin monitoring blood glucose using Sumanth 3 CGM and nohelia  2) Requested insulin from doctor, recommend start Toujeo insulin at 20 units daily   3) After BG at target in the future, consider change to GLP-1    Topics to cover at upcoming visits: Being Active, Monitoring and Taking Medication    Follow-up: " "1/13    See Care Plan for co-developed, patient-state behavior change goals.  AVS provided for patient today.    Education Materials Provided:  Carbohydrate Counting, Hypoglycemia Signs and Symptoms and PCRM Recipes for Success & Rx graphic      SUBJECTIVE/OBJECTIVE:  Presents for: Initial Assessment for new diagnosis  Accompanied by: Self  Diabetes education in the past 24mo: No (initial ed)  Diabetes type: Type 2  Date of diagnosis: Dec 2022  Diabetes management related comments/concerns: nothing off the top of my head  Difficulty affording diabetes medication?: No (taking 500 BID)  Other concerns:: None  Cultural Influences/Ethnic Background:  Not  or     Diabetes Symptoms & Complications:  Neuropathy: Sometimes (some - might be associated with slipped disc but feels like they are asleep)  Visual change: Sometimes (maybe)       Patient Problem List and Family Medical History reviewed for relevant medical history, current medical status, and diabetes risk factors.    Vitals:  There were no vitals taken for this visit.  Estimated body mass index is 31.6 kg/m  as calculated from the following:    Height as of 12/9/22: 1.803 m (5' 10.98\").    Weight as of 12/9/22: 102.7 kg (226 lb 8 oz).   Last 3 BP:   BP Readings from Last 3 Encounters:   12/09/22 124/74   01/11/22 112/76   11/12/21 104/52       History   Smoking Status     Former     Types: Cigars   Smokeless Tobacco     Never       Labs:  Lab Results   Component Value Date    A1C 11.8 12/05/2022     Lab Results   Component Value Date     12/09/2022     02/22/2022     11/09/2020     Lab Results   Component Value Date     02/22/2022     11/09/2020     HDL Cholesterol   Date Value Ref Range Status   11/09/2020 51 >39 mg/dL Final     Direct Measure HDL   Date Value Ref Range Status   02/22/2022 45 >=40 mg/dL Final   ]  GFR Estimate   Date Value Ref Range Status   12/09/2022 82 >60 mL/min/1.73m2 Final     Comment:     " Effective December 21, 2021 eGFRcr in adults is calculated using the 2021 CKD-EPI creatinine equation which includes age and gender (Gayathri chan al., NEJM, DOI: 10.1056/TVEBfp8363431)   11/09/2020 73 >60 mL/min/[1.73_m2] Final     Comment:     Non  GFR Calc  Starting 12/18/2018, serum creatinine based estimated GFR (eGFR) will be   calculated using the Chronic Kidney Disease Epidemiology Collaboration   (CKD-EPI) equation.       GFR Estimate If Black   Date Value Ref Range Status   11/09/2020 84 >60 mL/min/[1.73_m2] Final     Comment:      GFR Calc  Starting 12/18/2018, serum creatinine based estimated GFR (eGFR) will be   calculated using the Chronic Kidney Disease Epidemiology Collaboration   (CKD-EPI) equation.       Lab Results   Component Value Date    CR 1.08 12/09/2022    CR 1.15 11/09/2020     No results found for: MICROALBUMIN    Healthy Eating:  Healthy Eating Assessed Today: Yes (has trying to lose weight - felt successful but then realized it's because of DB- trying to go slowly, not drastic)  Meal planning/habits: Calorie counting, Other (uses an nohelia chronometer, trying to eat more healthy not necessarily less)  How many times a week on average do you eat food made away from home (restaurant/take-out)?: 1 (trying to reduce, 1-2x/week)  Meals include: Breakfast, Lunch, Dinner (eating more veg, fruit, fiber, complex carb, whole; reading labels, shares cooking and shopping)  Breakfast: usually light meal- yesterday 2 mandarine oranges  a heavy breakfast would be granola and yogurt or 2 toast with butter + 1-2 cups coffee  Lunch: turkey and cheese sandwich (multi grain bread) broiled open face OR salad (lettuce, deli meat, vinegrette dressing ~2 TBS, + random leftover veg)  Dinner: yesterday comvfort food: mac n cneese with hot dog OR pasta wtih home made sauce (veggies, tomatoes) & veggies like broccoli or green beans, protein  Snacks: chex mix (holiday food)  Beverages: Tea,  Coffee (green tea, non-alcoholic beer or cider drinks)  Has patient met with a dietitian in the past?: No (has done some reading online- doesn't want to do a fad diet)    Being Active:  Being Active Assessed Today: No    Monitoring:  Monitoring Assessed Today: Yes  Did patient bring glucose meter to appointment? : No (does not have yet)  Blood Glucose Meter: CGM (ordered Sumanth 3)      Taking Medications:  Diabetes Medication(s)     Biguanides       metFORMIN (GLUCOPHAGE) 500 MG tablet    Take 1 tablet (500 mg) by mouth 2 times daily (with meals)    Insulin       Insulin Glargine-yfgn (SEMGLEE, YFGN,) 100 UNIT/ML SOPN    Inject 10 Units Subcutaneous At Bedtime          Taking Medication Assessed Today: Yes  Current Treatments: Oral Medication (taken by mouth) (Metformin 500 BID, he is willing to start insulin per my recommendation)  Problems taking diabetes medications regularly?: No  Diabetes medication side effects?: No    Problem Solving:  Problem Solving Assessed Today: Yes  Is the patient at risk for hypoglycemia?: No (but we did discuss with insulin initiation- desiree kessler)  Does patient have severe weather/disaster plan for diabetes management?: Not Needed  Does patient have sick day plan for diabetes management?: Not Needed              Reducing Risks:  Reducing Risks Assessed Today: Yes  Diabetes Risks: Age over 45 years, Family History  CAD Risks: Diabetes Mellitus, Family history, Hypertension, Male sex, Obesity    Healthy Coping:  Healthy Coping Assessed Today: Yes  Emotional response to diabetes: Ready to learn, Acceptance, Confidence diabetes can be controlled  Informal Support system:: Spouse  Stage of change: PREPARATION (Decided to change - considering how)  Patient Activation Measure Survey Score:  No flowsheet data found.    Care Plan and Education Provided:  Care Plan: Diabetes   Updates made by Kathy Russo RD since 12/23/2022 12:00 AM      Problem: HbA1C Not In Goal       Goal: Establish  Regular Follow-Ups with PCP       Task: Discuss with PCP the recommended timing for patient's next follow up visit(s)    Responsible User: Kathy Russo RD      Task: Discuss schedule for PCP visits with patient    Responsible User: Kathy Russo RD      Goal: Get HbA1C Level in Goal       Task: Educate patient on diabetes education self-management topics    Responsible User: Kathy Russo RD      Task: Educate patient on benefits of regular glucose monitoring    Responsible User: Kathy Russo RD      Task: Refer patient to appropriate extended care team member, as needed (Medication Therapy Management, Behavioral Health, Physical Therapy, etc.)    Responsible User: Kathy Russo RD      Task: Discuss diabetes treatment plan with patient    Responsible User: Kathy Russo RD      Problem: Diabetes Self-Management Education Needed to Optimize Self-Care Behaviors       Goal: Understand diabetes pathophysiology and disease progression       Task: Provide education on diabetes pathophysiology and disease progression specfic to patient's diabetes type Completed 12/23/2022   Responsible User: Kathy Russo RD      Goal: Healthy Eating - follow a healthy eating pattern for diabetes       Task: Provide education on portion control and consistency in amount, composition and timing of food intake Completed 12/23/2022   Responsible User: Kathy Russo RD      Task: Provide education on managing carbohydrate intake (carbohydrate counting, plate planning method, etc.) Completed 12/23/2022   Responsible User: Kathy Russo RD      Task: Provide education on weight management Completed 12/23/2022   Responsible User: Kathy Russo RD      Task: Provide education on heart healthy eating    Responsible User: Kathy Russo RD      Task: Provide education on eating out    Responsible User: Kathy Russo RD      Task: Develop individualized healthy eating plan with patient     Responsible User: Kathy Russo RD      Goal: Being Active - get regular physical activity, working up to at least 150 minutes per week       Task: Provide education on relationship of activity to glucose and precautions to take if at risk for low glucose    Responsible User: Kathy Russo RD      Task: Discuss barriers to physical activity with patient    Responsible User: Kathy Russo RD      Task: Develop physical activity plan with patient    Responsible User: Kathy Russo RD      Task: Explore community resources including walking groups, assistance programs, and home videos    Responsible User: Kathy Russo RD      Goal: Monitoring - monitor glucose and ketones as directed    This Visit's Progress: 0%   Note:    Begin monitoring blood glucose with Sumanth 3     Task: Provide education on blood glucose monitoring (purpose, proper technique, frequency, glucose targets, interpreting results, when to use glucose control solution, sharps disposal)    Responsible User: Kathy Russo RD      Task: Provide education on continuous glucose monitoring (sensor placement, use of nohelia or /reader, understanding glucose trends, alerts and alarms, differences between sensor glucose and blood glucose) Completed 12/23/2022   Responsible User: Kathy Russo RD      Task: Provide education on ketone monitoring (when to monitor, frequency, etc.)    Responsible User: Kathy Russo RD      Goal: Taking Medication - patient is consistently taking medications as directed    This Visit's Progress: 0%   Note:    Initiate additional diabetes meds (insulin) as prescribed     Task: Provide education on action of prescribed medication, including when to take and possible side effects Completed 12/23/2022   Responsible User: Kathy Russo RD      Task: Provide education on insulin and injectable diabetes medications, including administration, storage, site selection and rotation for  injection sites Completed 12/23/2022   Responsible User: Kathy Russo RD      Task: Discuss barriers to medication adherence with patient and provide management technique ideas as appropriate    Responsible User: Kathy Russo RD      Task: Provide education on frequency and refill details of medications    Responsible User: Kathy Russo RD      Goal: Problem Solving - know how to prevent and manage short-term diabetes complications       Task: Provide education on high blood glucose - causes, signs/symptoms, prevention and treatment Completed 12/23/2022   Responsible User: Kathy Russo RD      Task: Provide education on low blood glucose - causes, signs/symptoms, prevention, treatment, carrying a carbohydrate source at all times, and medical identification Completed 12/23/2022   Responsible User: Kathy Russo RD      Task: Provide education on safe travel with diabetes    Responsible User: Kathy Russo RD      Task: Provide education on how to care for diabetes on sick days    Responsible User: Kathy Russo RD      Task: Provide education on when to call a health care provider    Responsible User: Kathy Russo RD      Goal: Reducing Risks - know how to prevent and treat long-term diabetes complications       Task: Provide education on major complications of diabetes, prevention, early diagnostic measures and treatment of complications Completed 12/23/2022   Responsible User: Kathy Russo RD      Task: Provide education on recommended care for dental, eye and foot health    Responsible User: Kathy Russo RD      Task: Provide education on Hemoglobin A1c - goals and relationship to blood glucose levels Completed 12/23/2022   Responsible User: Kathy Russo RD      Task: Provide education on recommendations for heart health - lipid levels and goals, blood pressure and goals, and aspirin therapy, if indicated    Responsible User: Kathy Russo RD       Task: Provide education on tobacco cessation    Responsible User: Kathy Russo RD      Goal: Healthy Coping - use available resources to cope with the challenges of managing diabetes       Task: Discuss recognizing feelings about having diabetes Completed 12/23/2022   Responsible User: Kathy Russo RD      Task: Provide education on the benefits of making appropriate lifestyle changes Completed 12/23/2022   Responsible User: Kathy Russo RD      Task: Provide education on benefits of utilizing support systems    Responsible User: Kathy Russo RD      Task: Discuss methods for coping with stress    Responsible User: Kathy Russo RD      Task: Provide education on when to seek professional counseling    Responsible User: Kathy Russo RD Suzanne Sorensen RD, LD, [facility]    Time Spent: 60 minutes  Encounter Type: Individual    Any diabetes medication dose changes were made via the CDE Protocol per the patient's referring provider. A copy of this encounter was shared with the provider.

## 2022-12-28 ENCOUNTER — VIRTUAL VISIT (OUTPATIENT)
Dept: PHARMACY | Facility: CLINIC | Age: 52
End: 2022-12-28
Attending: NURSE PRACTITIONER
Payer: COMMERCIAL

## 2022-12-28 DIAGNOSIS — E11.65 TYPE 2 DIABETES MELLITUS WITH HYPERGLYCEMIA, WITHOUT LONG-TERM CURRENT USE OF INSULIN (H): Primary | ICD-10-CM

## 2022-12-28 DIAGNOSIS — I48.91 ATRIAL FIBRILLATION, UNSPECIFIED TYPE (H): ICD-10-CM

## 2022-12-28 DIAGNOSIS — M10.00 IDIOPATHIC GOUT, UNSPECIFIED CHRONICITY, UNSPECIFIED SITE: ICD-10-CM

## 2022-12-28 DIAGNOSIS — I10 ESSENTIAL HYPERTENSION WITH GOAL BLOOD PRESSURE LESS THAN 140/90: ICD-10-CM

## 2022-12-28 DIAGNOSIS — J45.30 MILD PERSISTENT ASTHMA WITHOUT COMPLICATION: ICD-10-CM

## 2022-12-28 PROCEDURE — 99605 MTMS BY PHARM NP 15 MIN: CPT | Performed by: PHARMACIST

## 2022-12-28 PROCEDURE — 99607 MTMS BY PHARM ADDL 15 MIN: CPT | Performed by: PHARMACIST

## 2022-12-28 NOTE — PATIENT INSTRUCTIONS
"Recommendations from today's MTM visit:                                                    MTM (medication therapy management) is a service provided by a clinical pharmacist designed to help you get the most of out of your medicines.   Today we reviewed what your medicines are for, how to know if they are working, that your medicines are safe and how to make your medicine regimen as easy as possible.      Reviewed how to use the Semglee pen today. Please start injecting 10 units once a day about the same time every day. Here is a website that reviews this information in a video - IZA Semglee is \"insulin glargine\" which is in the same insulin as Lantus.  The video was made for Lantus.     [email or web address]/[email or web address]-[email or web address]    2. Consider talking to your provider about starting a statin for prevention of heart attack and stroke.  Statin medications (example atorvastatin- Lipitor) is recommended if you have diabetes regardless of your cholesterol level.      3. We talked about your asthma regimen.  Consider using your QVar inhaler \"on demand\" when you have symptoms, instead of daily.  If you are feeling short of breath and would use your albuterol 1-2 puffs, follow that up with 2 puffs of QVar.       Follow-up: as needed, please follow-up with diabetes education for adjustment of your insulin    It was great speaking with you today.  I value your experience and would be very thankful for your time in providing feedback in our clinic survey. In the next few days, you may receive an email or text message from [employer] with a link to a survey related to your  clinical pharmacist.\"     To schedule another MTM appointment, please call the clinic directly or you may call the MTM scheduling line at [phone] or toll-free at [phone].     My Clinical Pharmacist's contact information:                                                      Please feel free to contact me with any questions or concerns you " have.      Adelaide Watkins, PharmD, BCACP   Medication Management Pharmacist   [facility]  [phone]

## 2022-12-28 NOTE — PROGRESS NOTES
Medication Therapy Management (MTM) Encounter    ASSESSMENT:                            Medication Adherence/Access: No issues identified    Type 2 Diabetes: A1c not at goal <7%. Education provided today on starting basal insulin.    Statin is recommended; he is not taking statin. Briefly discussed today and recommend he follow-up with PCP or cardiologist to further discuss.     Hypertension, A fib: stable    Gout: stable    Asthma: stable. Considering he is using QVar only twice weekly and this is controlling symptoms well, he may be a good candidate for SMART therapy. Discussed with patient and he is interested. Will use albuterol as needed and follow-up with 2 puffs of QVar    PLAN:                            1. Start Semglee 10 units daily as prescribed. Follow-up with CDE for dose titration  2. Considering starting atorvastatin 10mg daily - routing recommendation to PCP  3. Change inhaler to use to SMART therapy - take albuterol 1-2 puffs as needed, followed by QVar 2 puffs     Follow-up: as needed  Tremaine will be charged for future MTM visits. The following transition plan was discussed with Tremaine: follow-up with CDE for diabetes and PCP for other medication adjustments    SUBJECTIVE/OBJECTIVE:                          Tremaine King is a 52 year old male contacted via secure video for an initial visit. He was referred to me from Samy Marcelino.      Reason for visit: learn how to use insulin.    Allergies/ADRs: Reviewed in chart  Past Medical History: Reviewed in chart  Tobacco: He reports that he quit smoking about 33 years ago. His smoking use included cigars. He has never used smokeless tobacco.  Alcohol: not currently using. Had previously used alcohol heavily.     Medication Adherence/Access: no issues reported since started to use pill box.    Type 2 Diabetes:  Currently taking metformin 500mg twice a day. Picked up insulin glargine per CDE recommendation and would like education today on how to use it.   Recommended dose is 10 units daily (0.1u/kg/day).  Patient is not experiencing side effects.  Blood sugar monitoring: will be starting continuous glucometer, hasn't received yet from pharmacy.   Symptoms of low blood sugar? none  Eye exam: due  Foot exam: due  Diet/Exercise: did not review diet today. He has been exercising at [employer] 3 times a week, feeling better with increased exercise.  Aspirin: Not taking due to Eliquis  Statin: Yes: no   ACEi/ARB: No.   Urine Albumin:   Lab Results   Component Value Date    UMALCR  12/09/2022      Comment:      Unable to calculate, urine albumin and/or urine creatinine is outside detectable limits.  Microalbuminuria is defined as an albumin:creatinine ratio of 17 to 299 for males and 25 to 299 for females. A ratio of albumin:creatinine of 300 or higher is indicative of overt proteinuria.  Due to biologic variability, positive results should be confirmed by a second, first-morning random or 24-hour timed urine specimen. If there is discrepancy, a third specimen is recommended. When 2 out of 3 results are in the microalbuminuria range, this is evidence for incipient nephropathy and warrants increased efforts at glucose control, blood pressure control, and institution of therapy with an angiotensin-converting-enzyme (ACE) inhibitor (if the patient can tolerate it).        Lab Results   Component Value Date    A1C 11.8 12/05/2022    A1C 5.4 10/06/2021     Hypertension,A fib: Current medications include metoprolol succinate 150mg daily, diltiazem ER 180mg daily, furosemide 20mg daily.  Taking Eliquis at 2.5mg twice a day without problems (lower dose due to history of GI bleed)  Patient reports no current medication side effects.  BP Readings from Last 3 Encounters:   12/09/22 124/74   01/11/22 112/76   11/12/21 104/52       Gout: Currently taking allopurinol 300mg daily. Patient reports no current pain concerns. Patient is experiencing the following medication side effects:  none. Considering stopping allopurinol since it's been quite a long time since he had gout symptoms.   Uric Acid   Date Value Ref Range Status   12/09/2022 6.4 3.4 - 7.0 mg/dL Final   11/09/2020 5.6 3.5 - 7.2 mg/dL Final   ]      Asthma: Current medications: ICS - Qvar 1 puff(s) typically remembers twice a week.  Short-Acting Bronchodilator: Albuterol MDI and pt reports using 1-2 times per month. Patient rinses their mouth after using steroid inhaler. Triggers include: strong odors and fumes, exercise or sports and cold air.  Patient reports the following symptoms: none.  Asthma Action Plan on file: YES  ACT Total Scores 8/20/2020 11/2/2021 12/5/2022   ACT TOTAL SCORE - - -   ASTHMA ER VISITS - - -   ASTHMA HOSPITALIZATIONS - - -   ACT TOTAL SCORE (Goal Greater than or Equal to 20) 23 23 24   In the past 12 months, how many times did you visit the emergency room for your asthma without being admitted to the hospital? 0 0 0   In the past 12 months, how many times were you hospitalized overnight because of your asthma? 0 0 0     Today's Vitals: There were no vitals taken for this visit.  ----------------      I spent 30 minutes with this patient today. All changes were made via collaborative practice agreement with Samy Marcelino MD. A copy of the visit note was provided to the patient's provider(s).    A summary of these recommendations was sent via [employer].    Adelaide Watkins, PharmD, BCACP     Telemedicine Visit Details  Type of service:  Video Conference via [employer]  Start Time: 2:04 PM  End Time: 2:35 PM  Originating Location (pt. Location): Home      Distant Location (provider location):  On-site  Provider has received verbal consent for a visit from the patient? Yes     Medication Therapy Recommendations  Diabetes mellitus, type 2 (H)    Rationale: Preventive therapy - Needs additional medication therapy - Indication   Recommendation: Start Medication - atorvastatin 10 MG tablet   Status: Contact Provider -  Awaiting Response         Mild persistent asthma without complication    Current Medication: beclomethasone HFA (QVAR REDIHALER) 80 MCG/ACT inhaler   Rationale: Patient forgets to take - Adherence - Adherence   Recommendation: Decrease Frequency   Status: Accepted per CPA

## 2022-12-28 NOTE — Clinical Note
He's following with CDE for insulin. Insurance doesn't cover follow-up with me. Consider starting statin, briefly discussed today.

## 2022-12-29 NOTE — TELEPHONE ENCOUNTER
Sumanth report:   A/P: Just started insulin yesterday night so too soon to adjust but will have him reach out on Monday to make insulin adjustments.    Annalise Joyner RDN, LD, [facility]

## 2023-01-13 ENCOUNTER — VIRTUAL VISIT (OUTPATIENT)
Dept: EDUCATION SERVICES | Facility: CLINIC | Age: 53
End: 2023-01-13
Payer: COMMERCIAL

## 2023-01-13 DIAGNOSIS — E11.65 TYPE 2 DIABETES MELLITUS WITH HYPERGLYCEMIA, WITH LONG-TERM CURRENT USE OF INSULIN (H): Primary | ICD-10-CM

## 2023-01-13 DIAGNOSIS — Z79.4 TYPE 2 DIABETES MELLITUS WITH HYPERGLYCEMIA, WITH LONG-TERM CURRENT USE OF INSULIN (H): Primary | ICD-10-CM

## 2023-01-13 DIAGNOSIS — E11.65 TYPE 2 DIABETES MELLITUS WITH HYPERGLYCEMIA, WITHOUT LONG-TERM CURRENT USE OF INSULIN (H): ICD-10-CM

## 2023-01-13 PROCEDURE — G0108 DIAB MANAGE TRN  PER INDIV: HCPCS | Performed by: DIETITIAN, REGISTERED

## 2023-01-13 RX ORDER — INSULIN GLARGINE-YFGN 100 [IU]/ML
16 INJECTION, SOLUTION SUBCUTANEOUS AT BEDTIME
Qty: 15 ML | Refills: 1 | Status: SHIPPED | OUTPATIENT
Start: 2023-01-13 | End: 2023-12-14

## 2023-01-13 NOTE — PROGRESS NOTES
Diabetes Self-Management Education & Support    Presents for: Follow-up    Type of service:  Video Visit    If the video visit is dropped, the video visit invitation should be resent by:call    Originating Location (pt. Location): Home  Distant Location (provider location): [facility]  Mode of Communication:  Video Conference via [employer]    Video Start Time: 8:30  Video End Time (time video stopped): 9:00    How would patient like to obtain AVS? MyChart      Assessment Type:   ASSESSMENT:  Tremaine has a recent dx of T2D with A1C >11%. Family history of DB.  He is working on improvements to diet and is purposefully exercising. He likes data and is watching for trends and patterns on his Sumanth 3.  Taking meds as recommended and BG is improving.       Patient's most recent   Lab Results   Component Value Date    A1C 11.8 12/05/2022     is not meeting goal of <7.0    Diabetes knowledge and skills assessment:   Patient is knowledgeable in diabetes management concepts related to: Healthy Eating, Being Active, Monitoring, Taking Medication, Reducing Risks and Healthy Coping    Continue education with the following diabetes management concepts: Monitoring, Taking Medication and Problem Solving    Based on learning assessment above, most appropriate setting for further diabetes education would be: Individual setting.      PLAN  1) increase insulin to 12 units starting tonight  2) you can increase by another 1 or 2 units weekly if average on Sumanth report is over 150   3) if seeing readings under 70, no increase   4) send TITIN Tech[employer] message if seeing numbers under 70 or with any questions    Topics to cover at upcoming visits: Taking Medication and Reducing Risks    Follow-up: 2/24    See Care Plan for co-developed, patient-state behavior change goals.  AVS provided for patient today.    Education Materials Provided:  No new materials provided today      SUBJECTIVE/OBJECTIVE:  Presents for:  "Follow-up  Accompanied by: Self  Diabetes education in the past 24mo: Yes (initial ed on 12/23/22)  Focus of Visit: Taking Medication, Monitoring, Healthy Eating  Diabetes type: Type 2  Date of diagnosis: Dec 2022  Disease course: Improving  Diabetes management related comments/concerns: nothing off the top of my head  Difficulty affording diabetes medication?: No  Other concerns:: None  Cultural Influences/Ethnic Background:  Not  or     Diabetes Symptoms & Complications:  Neuropathy: Sometimes (some - might be associated with slipped disc but feels like they are asleep)  Visual change: Sometimes (maybe)       Patient Problem List and Family Medical History reviewed for relevant medical history, current medical status, and diabetes risk factors.    Vitals:  There were no vitals taken for this visit.  Estimated body mass index is 31.6 kg/m  as calculated from the following:    Height as of 12/9/22: 1.803 m (5' 10.98\").    Weight as of 12/9/22: 102.7 kg (226 lb 8 oz).   Last 3 BP:   BP Readings from Last 3 Encounters:   12/09/22 124/74   01/11/22 112/76   11/12/21 104/52       History   Smoking Status     Former     Types: Cigars   Smokeless Tobacco     Never       Labs:  Lab Results   Component Value Date    A1C 11.8 12/05/2022     Lab Results   Component Value Date     12/09/2022     02/22/2022     11/09/2020     Lab Results   Component Value Date     02/22/2022     11/09/2020     HDL Cholesterol   Date Value Ref Range Status   11/09/2020 51 >39 mg/dL Final     Direct Measure HDL   Date Value Ref Range Status   02/22/2022 45 >=40 mg/dL Final   ]  GFR Estimate   Date Value Ref Range Status   12/09/2022 82 >60 mL/min/1.73m2 Final     Comment:     Effective December 21, 2021 eGFRcr in adults is calculated using the 2021 CKD-EPI creatinine equation which includes age and gender (Gayathri chan al., NEJM, DOI: 10.1056/LQCUnt1049751)   11/09/2020 73 >60 mL/min/[1.73_m2] Final    "  Comment:     Non  GFR Calc  Starting 12/18/2018, serum creatinine based estimated GFR (eGFR) will be   calculated using the Chronic Kidney Disease Epidemiology Collaboration   (CKD-EPI) equation.       GFR Estimate If Black   Date Value Ref Range Status   11/09/2020 84 >60 mL/min/[1.73_m2] Final     Comment:      GFR Calc  Starting 12/18/2018, serum creatinine based estimated GFR (eGFR) will be   calculated using the Chronic Kidney Disease Epidemiology Collaboration   (CKD-EPI) equation.       Lab Results   Component Value Date    CR 1.08 12/09/2022    CR 1.15 11/09/2020     No results found for: MICROALBUMIN    Healthy Eating:  Healthy Eating Assessed Today: Yes (has trying to lose weight - felt successful but then realized it's because of DB- trying to go slowly, not drastic)  Meal planning/habits: Calorie counting, Other, Carb counting (uses an nohelia chronometer, trying to eat more healthy not necessarily less)  How many times a week on average do you eat food made away from home (restaurant/take-out)?: 1 (trying to reduce, 1-2x/week)  Meals include: Breakfast, Lunch, Dinner (eating more veg, fruit, fiber, complex carb, whole; reading labels, shares cooking and shopping)  Breakfast: usually light meal- yesterday 2 mandarine oranges  a heavy breakfast would be granola and yogurt or 2 toast with butter + 1-2 cups coffee (goes to exercise class at 6:15 AM a few days/week- doesn't eat before)  Lunch: turkey and cheese sandwich (multi grain bread) broiled open face OR at work salad (lettuce, deli meat, vinegrette dressing ~2 TBS, + random leftover veg)  Dinner: yesterday comvfort food: mac n cneese with hot dog OR pasta wtih home made sauce (veggies, tomatoes) & veggies like broccoli or green beans, protein  Snacks: nuts  Beverages: Tea, Coffee (green tea, non-alcoholic beer or cider drinks)  Has patient met with a dietitian in the past?: No (has done some reading online- doesn't want to  do a fad diet)    Being Active:  Being Active Assessed Today: Yes  Exercise:: Yes  Days per week of moderate to strenuous exercise (like a brisk walk): 3 (attends an exercise class 3x/week at 6:15 AM)  Barrier to exercise: None    Monitoring:  Monitoring Assessed Today: Yes  Did patient bring glucose meter to appointment? : No (does not have yet)  Blood Glucose Meter: CGM (Sumanth 3)  Times checking blood sugar at home (number): 5+  Times checking blood sugar at home (per): Day  Blood glucose trend: Decreasing              Taking Medications:  Diabetes Medication(s)     Biguanides       metFORMIN (GLUCOPHAGE) 500 MG tablet    Take 1 tablet (500 mg) by mouth 2 times daily (with meals)    Insulin       Insulin Glargine-yfgn (SEMGLEE, YFGN,) 100 UNIT/ML SOPN    Inject 10 Units Subcutaneous At Bedtime          Taking Medication Assessed Today: Yes  Current Treatments: Oral Medication (taken by mouth), Insulin Injections  Dose schedule: At bedtime  Given by: Patient  Problems taking diabetes medications regularly?: No  Diabetes medication side effects?: No    Problem Solving:  Problem Solving Assessed Today: Yes  Is the patient at risk for hypoglycemia?: No (but we did discuss with insulin initiation- desiree kessler)  Does patient have severe weather/disaster plan for diabetes management?: Not Needed  Does patient have sick day plan for diabetes management?: Not Needed              Reducing Risks:  Reducing Risks Assessed Today: Yes  Diabetes Risks: Age over 45 years, Family History  CAD Risks: Diabetes Mellitus, Family history, Hypertension, Male sex, Obesity    Healthy Coping:  Healthy Coping Assessed Today: Yes  Emotional response to diabetes: Ready to learn, Acceptance, Confidence diabetes can be controlled  Informal Support system:: Spouse  Stage of change: PREPARATION (Decided to change - considering how)  Patient Activation Measure Survey Score:  No flowsheet data found.      Care Plan and Education Provided:  Care Plan:  "Diabetes   Updates made by Kathy Russo RD since 1/13/2023 12:00 AM      Problem: Diabetes Self-Management Education Needed to Optimize Self-Care Behaviors       Goal: Being Active - get regular physical activity, working up to at least 150 minutes per week       Task: Provide education on relationship of activity to glucose and precautions to take if at risk for low glucose Completed 1/13/2023   Responsible User: Kathy Russo RD      Goal: Monitoring - monitor glucose and ketones as directed    This Visit's Progress: 100%   Recent Progress: 0%   Note:    Begin monitoring blood glucose with Sumanth 3     Goal: Taking Medication - patient is consistently taking medications as directed    This Visit's Progress: 100%   Recent Progress: 0%   Note:    Initiate additional diabetes meds (insulin) as prescribed       Sandra to Tremaine:  Hi Tremaine,   You mentioned this AM not knowing what to watch for is blood glucose is going low and I forgot to come back to that.  Symptoms could include feeling lightheaded, shaky, sweaty, tingles, extra hungry...generally if you just feel \"off\" check your Sumanth.   Numbers are more likely to decrease with activity, so especially important to keep an eye open at or after exercise class, or if you're doing something like shoveling, mowing, etc.   To fix or \"treat\" a low blood sugar (= feeling \"off\" and number is under 100 or any number under 70) you would consume 15g carb from a \"quick sugar source\" like 3-4 oz of juice or regular pop, 5-6 lifesaver candies, or 3 glucose tabs.     Let me know if you need anything :)  Kathy Russo RD, YULIANA, BEREKET Russo RD, YULIANA, TIFFANYES    Time Spent: 30 minutes  Encounter Type: Individual    Any diabetes medication dose changes were made via the CDE Protocol per the patient's referring provider. .    "

## 2023-01-13 NOTE — LETTER
1/13/2023         RE: Tremaine King  [address] [place]  [facility] [place] [address]        Dear Colleague,    Thank you for referring your patient, Tremaine King, to the [facility]. Please see a copy of my visit note below.    Diabetes Self-Management Education & Support    Presents for: Follow-up    Type of service:  Video Visit    If the video visit is dropped, the video visit invitation should be resent by:call    Originating Location (pt. Location): Home  Distant Location (provider location): [facility]  Mode of Communication:  Video Conference via [email or web address] Start Time: 8:30  Video End Time (time video stopped): 9:00    How would patient like to obtain AVS? MyChart      Assessment Type:   ASSESSMENT:  Tremaine has a recent dx of T2D with A1C >11%. Family history of DB.  He is working on improvements to diet and is purposefully exercising. He likes data and is watching for trends and patterns on his Sumanth 3.  Taking meds as recommended and BG is improving.       Patient's most recent   Lab Results   Component Value Date    A1C 11.8 12/05/2022     is not meeting goal of <7.0    Diabetes knowledge and skills assessment:   Patient is knowledgeable in diabetes management concepts related to: Healthy Eating, Being Active, Monitoring, Taking Medication, Reducing Risks and Healthy Coping    Continue education with the following diabetes management concepts: Monitoring, Taking Medication and Problem Solving    Based on learning assessment above, most appropriate setting for further diabetes education would be: Individual setting.      PLAN  1) increase insulin to 12 units starting tonight  2) you can increase by another 1 or 2 units weekly if average on Sumanth report is over 150   3) if seeing readings under 70, no increase   4) send [employer] message if seeing numbers under 70 or with any questions    Topics to cover at upcoming visits: Taking Medication and Reducing  "Risks    Follow-up: 2/24    See Care Plan for co-developed, patient-state behavior change goals.  AVS provided for patient today.    Education Materials Provided:  No new materials provided today      SUBJECTIVE/OBJECTIVE:  Presents for: Follow-up  Accompanied by: Self  Diabetes education in the past 24mo: Yes (initial ed on 12/23/22)  Focus of Visit: Taking Medication, Monitoring, Healthy Eating  Diabetes type: Type 2  Date of diagnosis: Dec 2022  Disease course: Improving  Diabetes management related comments/concerns: nothing off the top of my head  Difficulty affording diabetes medication?: No  Other concerns:: None  Cultural Influences/Ethnic Background:  Not  or     Diabetes Symptoms & Complications:  Neuropathy: Sometimes (some - might be associated with slipped disc but feels like they are asleep)  Visual change: Sometimes (maybe)       Patient Problem List and Family Medical History reviewed for relevant medical history, current medical status, and diabetes risk factors.    Vitals:  There were no vitals taken for this visit.  Estimated body mass index is 31.6 kg/m  as calculated from the following:    Height as of 12/9/22: 1.803 m (5' 10.98\").    Weight as of 12/9/22: 102.7 kg (226 lb 8 oz).   Last 3 BP:   BP Readings from Last 3 Encounters:   12/09/22 124/74   01/11/22 112/76   11/12/21 104/52       History   Smoking Status     Former     Types: Cigars   Smokeless Tobacco     Never       Labs:  Lab Results   Component Value Date    A1C 11.8 12/05/2022     Lab Results   Component Value Date     12/09/2022     02/22/2022     11/09/2020     Lab Results   Component Value Date     02/22/2022     11/09/2020     HDL Cholesterol   Date Value Ref Range Status   11/09/2020 51 >39 mg/dL Final     Direct Measure HDL   Date Value Ref Range Status   02/22/2022 45 >=40 mg/dL Final   ]  GFR Estimate   Date Value Ref Range Status   12/09/2022 82 >60 mL/min/1.73m2 Final     " Comment:     Effective December 21, 2021 eGFRcr in adults is calculated using the 2021 CKD-EPI creatinine equation which includes age and gender (Gayathri chan al., NEJM, DOI: 10.1056/[id number])   11/09/2020 73 >60 mL/min/[1.73_m2] Final     Comment:     Non  GFR Calc  Starting 12/18/2018, serum creatinine based estimated GFR (eGFR) will be   calculated using the Chronic Kidney Disease Epidemiology Collaboration   (CKD-EPI) equation.       GFR Estimate If Black   Date Value Ref Range Status   11/09/2020 84 >60 mL/min/[1.73_m2] Final     Comment:      GFR Calc  Starting 12/18/2018, serum creatinine based estimated GFR (eGFR) will be   calculated using the Chronic Kidney Disease Epidemiology Collaboration   (CKD-EPI) equation.       Lab Results   Component Value Date    CR 1.08 12/09/2022    CR 1.15 11/09/2020     No results found for: MICROALBUMIN    Healthy Eating:  Healthy Eating Assessed Today: Yes (has trying to lose weight - felt successful but then realized it's because of DB- trying to go slowly, not drastic)  Meal planning/habits: Calorie counting, Other, Carb counting (uses an nohelia chronometer, trying to eat more healthy not necessarily less)  How many times a week on average do you eat food made away from home (restaurant/take-out)?: 1 (trying to reduce, 1-2x/week)  Meals include: Breakfast, Lunch, Dinner (eating more veg, fruit, fiber, complex carb, whole; reading labels, shares cooking and shopping)  Breakfast: usually light meal- yesterday 2 mandarine oranges  a heavy breakfast would be granola and yogurt or 2 toast with butter + 1-2 cups coffee (goes to exercise class at 6:15 AM a few days/week- doesn't eat before)  Lunch: turkey and cheese sandwich (multi grain bread) broiled open face OR at work salad (lettuce, deli meat, vinegrette dressing ~2 TBS, + random leftover veg)  Dinner: yesterday comvfort food: mac n cneese with hot dog OR pasta wtih home made sauce (veggies,  tomatoes) & veggies like broccoli or green beans, protein  Snacks: nuts  Beverages: Tea, Coffee (green tea, non-alcoholic beer or cider drinks)  Has patient met with a dietitian in the past?: No (has done some reading online- doesn't want to do a fad diet)    Being Active:  Being Active Assessed Today: Yes  Exercise:: Yes  Days per week of moderate to strenuous exercise (like a brisk walk): 3 (attends an exercise class 3x/week at 6:15 AM)  Barrier to exercise: None    Monitoring:  Monitoring Assessed Today: Yes  Did patient bring glucose meter to appointment? : No (does not have yet)  Blood Glucose Meter: CGM (Sumanth 3)  Times checking blood sugar at home (number): 5+  Times checking blood sugar at home (per): Day  Blood glucose trend: Decreasing              Taking Medications:  Diabetes Medication(s)     Biguanides       metFORMIN (GLUCOPHAGE) 500 MG tablet    Take 1 tablet (500 mg) by mouth 2 times daily (with meals)    Insulin       Insulin Glargine-yfgn (SEMGLEE, YFGN,) 100 UNIT/ML SOPN    Inject 10 Units Subcutaneous At Bedtime          Taking Medication Assessed Today: Yes  Current Treatments: Oral Medication (taken by mouth), Insulin Injections  Dose schedule: At bedtime  Given by: Patient  Problems taking diabetes medications regularly?: No  Diabetes medication side effects?: No    Problem Solving:  Problem Solving Assessed Today: Yes  Is the patient at risk for hypoglycemia?: No (but we did discuss with insulin initiation- desiree kessler)  Does patient have severe weather/disaster plan for diabetes management?: Not Needed  Does patient have sick day plan for diabetes management?: Not Needed              Reducing Risks:  Reducing Risks Assessed Today: Yes  Diabetes Risks: Age over 45 years, Family History  CAD Risks: Diabetes Mellitus, Family history, Hypertension, Male sex, Obesity    Healthy Coping:  Healthy Coping Assessed Today: Yes  Emotional response to diabetes: Ready to learn, Acceptance, Confidence diabetes  "can be controlled  Informal Support system:: Spouse  Stage of change: PREPARATION (Decided to change - considering how)  Patient Activation Measure Survey Score:  No flowsheet data found.      Care Plan and Education Provided:  Care Plan: Diabetes   Updates made by Kathy Russo RD since 1/13/2023 12:00 AM      Problem: Diabetes Self-Management Education Needed to Optimize Self-Care Behaviors       Goal: Being Active - get regular physical activity, working up to at least 150 minutes per week       Task: Provide education on relationship of activity to glucose and precautions to take if at risk for low glucose Completed 1/13/2023   Responsible User: Kathy Russo RD      Goal: Monitoring - monitor glucose and ketones as directed    This Visit's Progress: 100%   Recent Progress: 0%   Note:    Begin monitoring blood glucose with Sumanth 3     Goal: Taking Medication - patient is consistently taking medications as directed    This Visit's Progress: 100%   Recent Progress: 0%   Note:    Initiate additional diabetes meds (insulin) as prescribed       Mychart to Tremaine:  Hi Tremaine,   You mentioned this AM not knowing what to watch for is blood glucose is going low and I forgot to come back to that.  Symptoms could include feeling lightheaded, shaky, sweaty, tingles, extra hungry...generally if you just feel \"off\" check your Sumanth.   Numbers are more likely to decrease with activity, so especially important to keep an eye open at or after exercise class, or if you're doing something like shoveling, mowing, etc.   To fix or \"treat\" a low blood sugar (= feeling \"off\" and number is under 100 or any number under 70) you would consume 15g carb from a \"quick sugar source\" like 3-4 oz of juice or regular pop, 5-6 lifesaver candies, or 3 glucose tabs.     Let me know if you need anything :)  Kathy Russo RD, YULIANA, BEREKET Russo RD, YULIANA, BEREKET    Time Spent: 30 minutes  Encounter Type: Individual    Any diabetes " medication dose changes were made via the CDE Protocol per the patient's referring provider. .

## 2023-02-23 NOTE — PROGRESS NOTES
"Diabetes Education Follow-up    Subjective/Objective:    Tremaine King sent in blood glucose log for review. Last date of communication was: 1/13/23.    Diabetes is being managed with   Diabetes Medications   Diabetes Medication(s)     Biguanides       metFORMIN (GLUCOPHAGE) 500 MG tablet    Take 1 tablet (500 mg) by mouth 2 times daily (with meals)    Insulin       Insulin Glargine-yfgn (SEMGLEE, YFGN,) 100 UNIT/ML SOPN    Inject 16 Units Subcutaneous At Bedtime May increase up to 16 units daily as indicated by numbers          BG/Food Log:         Assessment:  Tremaine's time in target has increased to 79% during the past 2 weeks with an average  and est A1C = 6.9% down from 11% in December.   He is having great success with diabetes self care!    Tremaine voices confidence with diabetes care and denies additional needs at this time. He identifies BG patterns around food and exercise.  He has made positive dietary changes including discontinuing alcohol in December. He describes some sugar cravings but if he feels he needs to eat sweets, he has \"one thing\" like one cookie.  Tremaine says he is continuing to work on decreasing snacking.     Working out at the gym in a 6 week challenge and goes 5 days/week.   You are not assigned to any care plan tasks for this patient.      Plan/Response:  No changes in the patient's current treatment plan  Follow up with labs and PCP as directed.    Kathy Russo RD, LD, [facility]  Time spent 13 minutes on video visit.     Any diabetes medication dose changes were made via the CDE Protocol and Collaborative Practice Agreement with the patient's referring provider. A copy of this encounter was shared with the provider.      "

## 2023-02-24 ENCOUNTER — VIRTUAL VISIT (OUTPATIENT)
Dept: EDUCATION SERVICES | Facility: CLINIC | Age: 53
End: 2023-02-24
Payer: COMMERCIAL

## 2023-02-24 DIAGNOSIS — Z79.4 TYPE 2 DIABETES MELLITUS WITH HYPERGLYCEMIA, WITH LONG-TERM CURRENT USE OF INSULIN (H): Primary | ICD-10-CM

## 2023-02-24 DIAGNOSIS — E11.65 TYPE 2 DIABETES MELLITUS WITH HYPERGLYCEMIA, WITH LONG-TERM CURRENT USE OF INSULIN (H): Primary | ICD-10-CM

## 2023-02-24 PROCEDURE — 98967 PH1 ASSMT&MGMT NQHP 11-20: CPT | Mod: 95 | Performed by: DIETITIAN, REGISTERED

## 2023-02-24 NOTE — LETTER
"    2/24/2023         RE: Tremaine King  [address]  [place] [address]        Dear Colleague,    Thank you for referring your patient, Tremaine King, to the [facility]. Please see a copy of my visit note below.    Diabetes Education Follow-up    Subjective/Objective:    Tremaine King sent in blood glucose log for review. Last date of communication was: 1/13/23.    Diabetes is being managed with   Diabetes Medications   Diabetes Medication(s)     Biguanides       metFORMIN (GLUCOPHAGE) 500 MG tablet    Take 1 tablet (500 mg) by mouth 2 times daily (with meals)    Insulin       Insulin Glargine-yfgn (SEMGLEE, YFGN,) 100 UNIT/ML SOPN    Inject 16 Units Subcutaneous At Bedtime May increase up to 16 units daily as indicated by numbers          BG/Food Log:         Assessment:  Tremaine's time in target has increased to 79% during the past 2 weeks with an average  and est A1C = 6.9% down from 11% in December.   He is having great success with diabetes self care!    Tremaine voices confidence with diabetes care and denies additional needs at this time. He identifies BG patterns around food and exercise.  He has made positive dietary changes including discontinuing alcohol in December. He describes some sugar cravings but if he feels he needs to eat sweets, he has \"one thing\" like one cookie.  Tremaine says he is continuing to work on decreasing snacking.     Working out at the gym in a 6 week challenge and goes 5 days/week.   You are not assigned to any care plan tasks for this patient.      Plan/Response:  No changes in the patient's current treatment plan  Follow up with labs and PCP as directed.    Kathy Russo RD, LD, [facility]  Time spent 13 minutes on video visit.     Any diabetes medication dose changes were made via the CDE Protocol and Collaborative Practice Agreement with the patient's referring provider. A copy of this encounter was shared with the provider.            "

## 2023-03-02 ENCOUNTER — TRANSFERRED RECORDS (OUTPATIENT)
Dept: HEALTH INFORMATION MANAGEMENT | Facility: CLINIC | Age: 53
End: 2023-03-02

## 2023-03-02 LAB — RETINOPATHY: NEGATIVE

## 2023-04-03 DIAGNOSIS — E11.65 TYPE 2 DIABETES MELLITUS WITH HYPERGLYCEMIA (H): ICD-10-CM

## 2023-04-04 NOTE — TELEPHONE ENCOUNTER
"Requested Prescriptions   Pending Prescriptions Disp Refills     insulin pen needle (32G X 4 MM) 32G X 4 MM miscellaneous 100 each 1     Sig: Use 1 pen needles daily as directed.       Diabetic Supplies Protocol Passed - 4/3/2023  3:37 PM        Passed - Medication is active on med list        Passed - Patient is 18 years of age or older        Passed - Recent (6 mo) or future (30 days) visit within the authorizing provider's specialty     Patient had office visit in the last 6 months or has a visit in the next 30 days with authorizing provider.  See \"Patient Info\" tab in inbasket, or \"Choose Columns\" in Meds & Orders section of the refill encounter.               Prescription approved per [facility] Refill Protocol.    Salome Oswald RN  [facility]   "

## 2023-04-08 ENCOUNTER — HEALTH MAINTENANCE LETTER (OUTPATIENT)
Age: 53
End: 2023-04-08

## 2023-04-15 ENCOUNTER — LAB (OUTPATIENT)
Dept: LAB | Facility: CLINIC | Age: 53
End: 2023-04-15
Payer: COMMERCIAL

## 2023-04-15 DIAGNOSIS — E11.9 DIABETES MELLITUS, TYPE 2 (H): ICD-10-CM

## 2023-04-15 DIAGNOSIS — Z11.4 SCREENING FOR HIV (HUMAN IMMUNODEFICIENCY VIRUS): ICD-10-CM

## 2023-04-15 DIAGNOSIS — Z11.59 NEED FOR HEPATITIS C SCREENING TEST: ICD-10-CM

## 2023-04-15 LAB — HBA1C MFR BLD: 6.8 % (ref 0–5.6)

## 2023-04-15 PROCEDURE — 87389 HIV-1 AG W/HIV-1&-2 AB AG IA: CPT

## 2023-04-15 PROCEDURE — 86803 HEPATITIS C AB TEST: CPT

## 2023-04-15 PROCEDURE — 36415 COLL VENOUS BLD VENIPUNCTURE: CPT

## 2023-04-15 PROCEDURE — 83036 HEMOGLOBIN GLYCOSYLATED A1C: CPT

## 2023-04-17 LAB
HCV AB SERPL QL IA: NONREACTIVE
HIV 1+2 AB+HIV1 P24 AG SERPL QL IA: NONREACTIVE

## 2023-04-18 ENCOUNTER — LAB (OUTPATIENT)
Dept: LAB | Facility: CLINIC | Age: 53
End: 2023-04-18
Attending: INTERNAL MEDICINE
Payer: COMMERCIAL

## 2023-04-18 DIAGNOSIS — E78.5 DYSLIPIDEMIA: ICD-10-CM

## 2023-04-18 DIAGNOSIS — I10 HYPERTENSION, UNSPECIFIED TYPE: ICD-10-CM

## 2023-04-18 LAB
ANION GAP SERPL CALCULATED.3IONS-SCNC: 12 MMOL/L (ref 7–15)
BUN SERPL-MCNC: 20.9 MG/DL (ref 6–20)
CALCIUM SERPL-MCNC: 10.2 MG/DL (ref 8.6–10)
CHLORIDE SERPL-SCNC: 99 MMOL/L (ref 98–107)
CHOLEST SERPL-MCNC: 154 MG/DL
CREAT SERPL-MCNC: 1.16 MG/DL (ref 0.67–1.17)
DEPRECATED HCO3 PLAS-SCNC: 28 MMOL/L (ref 22–29)
ERYTHROCYTE [DISTWIDTH] IN BLOOD BY AUTOMATED COUNT: 11.8 % (ref 10–15)
GFR SERPL CREATININE-BSD FRML MDRD: 75 ML/MIN/1.73M2
GLUCOSE SERPL-MCNC: 134 MG/DL (ref 70–99)
HCT VFR BLD AUTO: 43.3 % (ref 40–53)
HDLC SERPL-MCNC: 44 MG/DL
HGB BLD-MCNC: 15 G/DL (ref 13.3–17.7)
LDLC SERPL CALC-MCNC: 90 MG/DL
MCH RBC QN AUTO: 29.4 PG (ref 26.5–33)
MCHC RBC AUTO-ENTMCNC: 34.6 G/DL (ref 31.5–36.5)
MCV RBC AUTO: 85 FL (ref 78–100)
NONHDLC SERPL-MCNC: 110 MG/DL
PLATELET # BLD AUTO: 169 10E3/UL (ref 150–450)
POTASSIUM SERPL-SCNC: 4.3 MMOL/L (ref 3.4–5.3)
RBC # BLD AUTO: 5.1 10E6/UL (ref 4.4–5.9)
SODIUM SERPL-SCNC: 139 MMOL/L (ref 136–145)
TRIGL SERPL-MCNC: 101 MG/DL
TSH SERPL DL<=0.005 MIU/L-ACNC: 1.51 UIU/ML (ref 0.3–4.2)
WBC # BLD AUTO: 8.7 10E3/UL (ref 4–11)

## 2023-04-18 PROCEDURE — 84443 ASSAY THYROID STIM HORMONE: CPT

## 2023-04-18 PROCEDURE — 80048 BASIC METABOLIC PNL TOTAL CA: CPT

## 2023-04-18 PROCEDURE — 80061 LIPID PANEL: CPT

## 2023-04-18 PROCEDURE — 36415 COLL VENOUS BLD VENIPUNCTURE: CPT

## 2023-04-18 PROCEDURE — 85027 COMPLETE CBC AUTOMATED: CPT

## 2023-04-29 DIAGNOSIS — E11.65 TYPE 2 DIABETES MELLITUS WITH HYPERGLYCEMIA, WITHOUT LONG-TERM CURRENT USE OF INSULIN (H): Primary | ICD-10-CM

## 2023-04-29 NOTE — RESULT ENCOUNTER NOTE
Shree Penaloza: Your A1c looks great- keep up the good work. Recheck in 4 months. Contact if questions.    Samy KNOWLES

## 2023-05-17 ENCOUNTER — VIRTUAL VISIT (OUTPATIENT)
Dept: CARDIOLOGY | Facility: CLINIC | Age: 53
End: 2023-05-17
Attending: INTERNAL MEDICINE
Payer: COMMERCIAL

## 2023-05-17 DIAGNOSIS — I48.11 LONGSTANDING PERSISTENT ATRIAL FIBRILLATION (H): ICD-10-CM

## 2023-05-17 DIAGNOSIS — I10 ESSENTIAL HYPERTENSION WITH GOAL BLOOD PRESSURE LESS THAN 140/90: ICD-10-CM

## 2023-05-17 DIAGNOSIS — Z98.890 S/P MVR (MITRAL VALVE REPAIR): Primary | ICD-10-CM

## 2023-05-17 DIAGNOSIS — E78.5 DYSLIPIDEMIA: ICD-10-CM

## 2023-05-17 DIAGNOSIS — I10 HYPERTENSION, UNSPECIFIED TYPE: ICD-10-CM

## 2023-05-17 PROCEDURE — 99214 OFFICE O/P EST MOD 30 MIN: CPT | Mod: 95 | Performed by: INTERNAL MEDICINE

## 2023-05-17 RX ORDER — METOPROLOL SUCCINATE 100 MG/1
TABLET, EXTENDED RELEASE ORAL
Qty: 135 TABLET | Refills: 3 | Status: SHIPPED | OUTPATIENT
Start: 2023-05-17 | End: 2024-01-26

## 2023-05-17 RX ORDER — DILTIAZEM HYDROCHLORIDE 180 MG/1
180 CAPSULE, EXTENDED RELEASE ORAL DAILY
Qty: 90 CAPSULE | Refills: 3 | Status: SHIPPED | OUTPATIENT
Start: 2023-05-17 | End: 2024-05-16

## 2023-05-17 NOTE — LETTER
2023      RE: Tremaine King  [address]  [place] [address]       Dear Colleague,    Thank you for the opportunity to participate in the care of your patient, Tremaine King, at the [facility]. Please see a copy of my visit note below.    Service Date: 2023    Samy Marcelino MD  [address]  [place] [address]     RE:  Tremaine King   MRN:  [id number]   :  1970       Dear Dr. Marcelino:    It was a pleasure participating in the care of your patient, Tremaine King .  As you know, he is a 52 year old year old person who I met over a virtual visit today for persistent atrial fibrillation, status post mitral valve repair, hypertension and hyperlipidemia.    Past medical history is significant for the followin.  Hypertension.  2.  Hyperlipidemia.  3.  Migraine headaches.  4.  Gout.  5.  Seasonal allergies with verbal history of mild asthma.  6.  Meniscus tear of the knee.  7.  History of morbid obesity with significant weight loss due to diet and exercise.  8.  Sleep apnea, treated with CPAP in .  9.  History of gastrointestinal bleed 2021.     As you recall, he had a coffee-ground emesis with black tarry stools with a hemoglobin of 6.6 11/10/2021.  EGD showed a 10 mm deep gastric ulcer that was cauterized and injected.  The ulcer was thought secondary to use of ibuprofen 600 mg a day along with alcohol use.     He has been followed for persistent atrial fibrillation.  His VXR8GR3-QDWt score is 1 for hypertension only at this point.    I last saw him 2022, at that time he was doing adequately, he presents today for continuing care.    Since the last visit he has continued to do well.  He continues his active lifestyle going to [employer] 3 times a week, performing high intensity interval training workouts.  These consist usually of a combination of  running, rowing and weights for about 45 minutes.  He is able to accomplish these high level exertional activities without symptoms of chest pain or shortness of breath or other limitation.    He is tolerating Eliquis nicely without gross bleeding.  His diabetes control has improved, however he says that he has gained a little bit of weight due to either the medicines and or lifestyle indiscretions.    He feels good and has no other complaints.    The patient otherwise denies gross chest pain, shortness of breath, PND, orthopnea, edema, palpitations, syncope or near syncope.    10 Point Review of Systems: Unremarkable    MEDICATIONS:    1.  Albuterol.  2.  Diltiazem 180 mg a day.  3.  Lasix 20 mg a day.  4.  Metoprolol succinate 150 mg a day.  5.  Sildenafil.  6.  Eliquis 2.5 mg twice daily    PHYSICAL EXAM:    Blood pressure 117/59, Apple Watch indicates resting heart rate in 50s, current weight 230 pounds  General:  Patient appears comfortable, well groomed  Psych:  Patient is alert and oriented X 3  Eyes:  No gross erythema, exudate  Respiratory:  Patient is breathing comfortably without gross cough    The remainder of the comprehensive physical exam was deferred secondary to the COVID-19 pandemic and secondary to virtual visit restrictions.    LABS:    4/18/2023: LDL 90, potassium 4.3, GFR normal, hemoglobin normal, TSH normal    Zio patch monitor 10/3/2022 [id number]% burden of AFib, average heart rate 58 beats per minute, pauses during sleeping hours, nonsustained VT.  No other arrhythmias.    Echocardiogram 10/26/2022 reveals an ejection fraction of 60 to 65% mean gradient across the mitral valve repair of 6 mmHg, mild mitral insufficiency, normal PA pressures    IMPRESSION:    Tremaine is a 52-year-old gentleman with several active cardiac issues:    1.  Persistent atrial fibrillation    Patient has been in A-fib since November 2021.  AQH7KE1-BBQq 2 score is currently 2 for diabetes and hypertension.  Due to his  prior history of severe life-threatening GI bleed he chooses to pursue half dose anticoagulation (also due to the interaction with the diltiazem, the actual anticoagulant effect should be slightly higher than usual)    He will also continue to abstain from alcohol and NSAID use as well.    2.  History of mitral valve repair    Mean gradient across the mitral valve is currently 6 mmHg as of echo 10/26/2022 and a heart rate of 52 bpm.  Continue to follow    3.  Hypertension well controlled:    Blood pressures currently running 117/59 continue to follow    4.  Hyperlipidemia    Lipids currently in goal range      PLAN:    1.  Continue present meds at present doses    2.  Virtual video visit follow-up 6 months with echo and labs prior, earlier if needed.    Once again, it was a pleasure participating in the care of your patient, Tremaine King .  Please feel free to contact me at any time if there are any questions regarding his care in the future.      Sincerely,          Tim Gotti M.D.  Cardiovascular Division  [facility]

## 2023-05-17 NOTE — LETTER
2023       RE: Tremaine King  [address]  [place] [address]     Dear Colleague,    Thank you for referring your patient, Tremaine King, to the [facility] [place] [facility]. Please see a copy of my visit note below.    Service Date: 2023    Samy Marcelino MD  [address]  [place] [address]     RE:  Tremaine King   MRN:  [id number]   :  1970       Dear Dr. Marcelino:    It was a pleasure participating in the care of your patient, Tremaine King .  As you know, he is a 52 year old year old person who I met over a virtual visit today for persistent atrial fibrillation, status post mitral valve repair, hypertension and hyperlipidemia.    Past medical history is significant for the followin.  Hypertension.  2.  Hyperlipidemia.  3.  Migraine headaches.  4.  Gout.  5.  Seasonal allergies with verbal history of mild asthma.  6.  Meniscus tear of the knee.  7.  History of morbid obesity with significant weight loss due to diet and exercise.  8.  Sleep apnea, treated with CPAP in .  9.  History of gastrointestinal bleed 2021.     As you recall, he had a coffee-ground emesis with black tarry stools with a hemoglobin of 6.6 11/10/2021.  EGD showed a 10 mm deep gastric ulcer that was cauterized and injected.  The ulcer was thought secondary to use of ibuprofen 600 mg a day along with alcohol use.     He has been followed for persistent atrial fibrillation.  His XAN4YD1-MDYj score is 1 for hypertension only at this point.    I last saw him 2022, at that time he was doing adequately, he presents today for continuing care.    Since the last visit he has continued to do well.  He continues his active lifestyle going to [employer] 3 times a week, performing high intensity interval training workouts.  These consist usually of a combination of running, rowing and weights for about 45  minutes.  He is able to accomplish these high level exertional activities without symptoms of chest pain or shortness of breath or other limitation.    He is tolerating Eliquis nicely without gross bleeding.  His diabetes control has improved, however he says that he has gained a little bit of weight due to either the medicines and or lifestyle indiscretions.    He feels good and has no other complaints.    The patient otherwise denies gross chest pain, shortness of breath, PND, orthopnea, edema, palpitations, syncope or near syncope.    10 Point Review of Systems: Unremarkable    MEDICATIONS:    1.  Albuterol.  2.  Diltiazem 180 mg a day.  3.  Lasix 20 mg a day.  4.  Metoprolol succinate 150 mg a day.  5.  Sildenafil.  6.  Eliquis 2.5 mg twice daily    PHYSICAL EXAM:    Blood pressure 117/59, Apple Watch indicates resting heart rate in 50s, current weight 230 pounds  General:  Patient appears comfortable, well groomed  Psych:  Patient is alert and oriented X 3  Eyes:  No gross erythema, exudate  Respiratory:  Patient is breathing comfortably without gross cough    The remainder of the comprehensive physical exam was deferred secondary to the COVID-19 pandemic and secondary to virtual visit restrictions.    LABS:    4/18/2023: LDL 90, potassium 4.3, GFR normal, hemoglobin normal, TSH normal    Zio patch monitor 10/3/2022 [id number]% burden of AFib, average heart rate 58 beats per minute, pauses during sleeping hours, nonsustained VT.  No other arrhythmias.    Echocardiogram 10/26/2022 reveals an ejection fraction of 60 to 65% mean gradient across the mitral valve repair of 6 mmHg, mild mitral insufficiency, normal PA pressures    IMPRESSION:    Tremaine is a 52-year-old gentleman with several active cardiac issues:    1.  Persistent atrial fibrillation    Patient has been in A-fib since November 2021.  VZG7YP8-LQRk 2 score is currently 2 for diabetes and hypertension.  Due to his prior history of severe life-threatening  GI bleed he chooses to pursue half dose anticoagulation (also due to the interaction with the diltiazem, the actual anticoagulant effect should be slightly higher than usual)    He will also continue to abstain from alcohol and NSAID use as well.    2.  History of mitral valve repair    Mean gradient across the mitral valve is currently 6 mmHg as of echo 10/26/2022 and a heart rate of 52 bpm.  Continue to follow    3.  Hypertension well controlled:    Blood pressures currently running 117/59 continue to follow    4.  Hyperlipidemia    Lipids currently in goal range      PLAN:    1.  Continue present meds at present doses    2.  Virtual video visit follow-up 6 months with echo and labs prior, earlier if needed.    Once again, it was a pleasure participating in the care of your patient, Tremaine King .  Please feel free to contact me at any time if there are any questions regarding his care in the future.      Sincerely,        Tim Gotti M.D.  Cardiovascular Division  [facility]

## 2023-05-17 NOTE — PROGRESS NOTES
Service Date: 2023    Samy Marcelino MD  [address]  [place] [address]     RE:  Tremaine King   MRN:  [id number]   :  1970       Dear Dr. Marcelino:    It was a pleasure participating in the care of your patient, Tremaine King .  As you know, he is a 52 year old year old person who I met over a virtual visit today for persistent atrial fibrillation, status post mitral valve repair, hypertension and hyperlipidemia.    Past medical history is significant for the followin.  Hypertension.  2.  Hyperlipidemia.  3.  Migraine headaches.  4.  Gout.  5.  Seasonal allergies with verbal history of mild asthma.  6.  Meniscus tear of the knee.  7.  History of morbid obesity with significant weight loss due to diet and exercise.  8.  Sleep apnea, treated with CPAP in .  9.  History of gastrointestinal bleed 2021.     As you recall, he had a coffee-ground emesis with black tarry stools with a hemoglobin of 6.6 11/10/2021.  EGD showed a 10 mm deep gastric ulcer that was cauterized and injected.  The ulcer was thought secondary to use of ibuprofen 600 mg a day along with alcohol use.     He has been followed for persistent atrial fibrillation.  His OKQ6CI8-MDFw score is 1 for hypertension only at this point.    I last saw him 2022, at that time he was doing adequately, he presents today for continuing care.    Since the last visit he has continued to do well.  He continues his active lifestyle going to [employer] 3 times a week, performing high intensity interval training workouts.  These consist usually of a combination of running, rowing and weights for about 45 minutes.  He is able to accomplish these high level exertional activities without symptoms of chest pain or shortness of breath or other limitation.    He is tolerating Eliquis nicely without gross bleeding.  His diabetes control has improved, however he says that he has gained a little bit of weight due  to either the medicines and or lifestyle indiscretions.    He feels good and has no other complaints.    The patient otherwise denies gross chest pain, shortness of breath, PND, orthopnea, edema, palpitations, syncope or near syncope.    10 Point Review of Systems: Unremarkable    MEDICATIONS:    1.  Albuterol.  2.  Diltiazem 180 mg a day.  3.  Lasix 20 mg a day.  4.  Metoprolol succinate 150 mg a day.  5.  Sildenafil.  6.  Eliquis 2.5 mg twice daily    PHYSICAL EXAM:    Blood pressure 117/59, Apple Watch indicates resting heart rate in 50s, current weight 230 pounds  General:  Patient appears comfortable, well groomed  Psych:  Patient is alert and oriented X 3  Eyes:  No gross erythema, exudate  Respiratory:  Patient is breathing comfortably without gross cough    The remainder of the comprehensive physical exam was deferred secondary to the COVID-19 pandemic and secondary to virtual visit restrictions.    LABS:    4/18/2023: LDL 90, potassium 4.3, GFR normal, hemoglobin normal, TSH normal    Zio patch monitor 10/3/2022 [id number]% burden of AFib, average heart rate 58 beats per minute, pauses during sleeping hours, nonsustained VT.  No other arrhythmias.    Echocardiogram 10/26/2022 reveals an ejection fraction of 60 to 65% mean gradient across the mitral valve repair of 6 mmHg, mild mitral insufficiency, normal PA pressures    IMPRESSION:    Tremaine is a 52-year-old gentleman with several active cardiac issues:    1.  Persistent atrial fibrillation    Patient has been in A-fib since November 2021.  HHE8UL6-SITp 2 score is currently 2 for diabetes and hypertension.  Due to his prior history of severe life-threatening GI bleed he chooses to pursue half dose anticoagulation (also due to the interaction with the diltiazem, the actual anticoagulant effect should be slightly higher than usual)    He will also continue to abstain from alcohol and NSAID use as well.    2.  History of mitral valve repair    Mean gradient  "across the mitral valve is currently 6 mmHg as of echo 10/26/2022 and a heart rate of 52 bpm.  Continue to follow    3.  Hypertension well controlled:    Blood pressures currently running 117/59 continue to follow    4.  Hyperlipidemia    Lipids currently in goal range      PLAN:    1.  Continue present meds at present doses    2.  Virtual video visit follow-up 6 months with echo and labs prior, earlier if needed.    Once again, it was a pleasure participating in the care of your patient, Tremaine King .  Please feel free to contact me at any time if there are any questions regarding his care in the future.      Sincerely,          Tim Gotti M.D.  Cardiovascular Division  [facility]    The patient has been notified of following:     \"This video visit will be conducted via a call between you and your physician/provider. We have found that certain health care needs can be provided without the need for an in-person physical exam.  This service lets us provide the care you need with a video conversation.  If a prescription is necessary we can send it directly to your pharmacy.  If lab work is needed we can place an order for that and you can then stop by our lab to have the test done at a later time.    Video visits are billed at different rates depending on your insurance coverage.  Please reach out to your insurance provider with any questions.    If during the course of the call the physician/provider feels a video visit is not appropriate, you will not be charged for this service.\"    Patient has given verbal consent for video visit? Yes    How would you like to obtain your AVS? Mail    Video-Visit Details    Type of service:  Video Visit    Video Start Time:735am    Video End Time:750am    Total visit time including video visit, chart review, charting, coordination of care =35min    Originating Location (pt. Location):patient home      Distant Location (provider location):   Off site home " office    Platform used for Video Visit: Azra MATTA#:[id number]

## 2023-05-17 NOTE — PATIENT INSTRUCTIONS
Virtual video visit followup in 6 months with echo and fasting labs completed at least 2 days prior

## 2023-05-17 NOTE — NURSING NOTE
Is the patient currently in the state of [place]? NO    Visit mode:VIDEO    If the visit is dropped, the patient can be reconnected by: VIDEO VISIT: Text to cell phone: [phone]    Will anyone else be joining the visit? NO      How would you like to obtain your AVS? MyChart    Are changes needed to the allergy or medication list? YES: Pt flagged alcohol prep pads for removal, please review/remove    Reason for visit: Follow Up (No other pt reported vitals to report today/)    Reid Taylor, SYED/CMA

## 2023-05-19 ENCOUNTER — TELEPHONE (OUTPATIENT)
Dept: CARDIOLOGY | Facility: CLINIC | Age: 53
End: 2023-05-19

## 2023-05-19 NOTE — TELEPHONE ENCOUNTER
Left Voicemail (1st Attempt) and Sent Mychart (1st Attempt) for the patient to call back and schedule the following:    Appointment type: Cardiology- Return General  Provider: Shen  Return date: 11/17/23  Specialty phone number: [phone]  Additional appointment(s) needed: labs and echo prior  Additonal Notes: none    Mulugeta Lerner, Visit Facilitator/MA.

## 2023-06-02 ENCOUNTER — MYC MEDICAL ADVICE (OUTPATIENT)
Dept: FAMILY MEDICINE | Facility: CLINIC | Age: 53
End: 2023-06-02

## 2023-09-03 ENCOUNTER — HEALTH MAINTENANCE LETTER (OUTPATIENT)
Age: 53
End: 2023-09-03

## 2023-10-16 ENCOUNTER — TELEPHONE (OUTPATIENT)
Dept: CARDIOLOGY | Facility: CLINIC | Age: 53
End: 2023-10-16
Payer: COMMERCIAL

## 2023-10-16 DIAGNOSIS — E11.65 TYPE 2 DIABETES MELLITUS WITH HYPERGLYCEMIA (H): ICD-10-CM

## 2023-10-17 RX ORDER — PEN NEEDLE, DIABETIC 32GX 5/32"
NEEDLE, DISPOSABLE MISCELLANEOUS
Qty: 100 EACH | Refills: 3 | Status: SHIPPED | OUTPATIENT
Start: 2023-10-17 | End: 2024-10-01

## 2023-10-17 NOTE — TELEPHONE ENCOUNTER
Pt calling stating that his pharmacy is stating that this needs to be filled under Dr. Marcelino. Thanks    Salome Oswald RN  [facility]

## 2023-11-05 ENCOUNTER — MYC REFILL (OUTPATIENT)
Dept: CARDIOLOGY | Facility: CLINIC | Age: 53
End: 2023-11-05
Payer: COMMERCIAL

## 2023-11-05 ENCOUNTER — MYC REFILL (OUTPATIENT)
Dept: FAMILY MEDICINE | Facility: CLINIC | Age: 53
End: 2023-11-05
Payer: COMMERCIAL

## 2023-11-05 DIAGNOSIS — I48.91 ATRIAL FIBRILLATION, UNSPECIFIED TYPE (H): ICD-10-CM

## 2023-11-05 DIAGNOSIS — I48.11 LONGSTANDING PERSISTENT ATRIAL FIBRILLATION (H): ICD-10-CM

## 2023-11-05 DIAGNOSIS — J45.30 MILD PERSISTENT ASTHMA WITHOUT COMPLICATION: ICD-10-CM

## 2023-11-06 RX ORDER — FUROSEMIDE 20 MG
20 TABLET ORAL DAILY
Qty: 90 TABLET | Refills: 3 | Status: SHIPPED | OUTPATIENT
Start: 2023-11-06 | End: 2024-10-01 | Stop reason: SINTOL

## 2023-11-12 DIAGNOSIS — E11.9 TYPE 2 DIABETES MELLITUS WITHOUT COMPLICATION, WITHOUT LONG-TERM CURRENT USE OF INSULIN (H): ICD-10-CM

## 2023-11-13 ENCOUNTER — ANCILLARY PROCEDURE (OUTPATIENT)
Dept: CARDIOLOGY | Facility: CLINIC | Age: 53
End: 2023-11-13
Attending: INTERNAL MEDICINE
Payer: COMMERCIAL

## 2023-11-13 DIAGNOSIS — Z98.890 S/P MVR (MITRAL VALVE REPAIR): ICD-10-CM

## 2023-11-13 LAB — LVEF ECHO: NORMAL

## 2023-11-13 PROCEDURE — 93306 TTE W/DOPPLER COMPLETE: CPT | Performed by: INTERNAL MEDICINE

## 2023-11-13 RX ORDER — BLOOD-GLUCOSE SENSOR
EACH MISCELLANEOUS
Qty: 6 EACH | Refills: 3 | Status: SHIPPED | OUTPATIENT
Start: 2023-11-13 | End: 2024-02-21

## 2023-11-26 DIAGNOSIS — E11.9 TYPE 2 DIABETES MELLITUS WITHOUT COMPLICATION, WITHOUT LONG-TERM CURRENT USE OF INSULIN (H): ICD-10-CM

## 2023-11-26 DIAGNOSIS — M10.00 IDIOPATHIC GOUT, UNSPECIFIED CHRONICITY, UNSPECIFIED SITE: ICD-10-CM

## 2023-11-27 ENCOUNTER — LAB (OUTPATIENT)
Dept: LAB | Facility: CLINIC | Age: 53
End: 2023-11-27
Payer: COMMERCIAL

## 2023-11-27 DIAGNOSIS — E11.9 DIABETES MELLITUS, TYPE 2 (H): Primary | ICD-10-CM

## 2023-11-27 DIAGNOSIS — E83.52 SERUM CALCIUM ELEVATED: ICD-10-CM

## 2023-11-27 DIAGNOSIS — E11.65 TYPE 2 DIABETES MELLITUS WITH HYPERGLYCEMIA, WITHOUT LONG-TERM CURRENT USE OF INSULIN (H): ICD-10-CM

## 2023-11-27 DIAGNOSIS — I10 HYPERTENSION, UNSPECIFIED TYPE: ICD-10-CM

## 2023-11-27 LAB
ANION GAP SERPL CALCULATED.3IONS-SCNC: 10 MMOL/L (ref 7–15)
BUN SERPL-MCNC: 18.5 MG/DL (ref 6–20)
CA-I BLD-MCNC: 4.6 MG/DL (ref 4.4–5.2)
CALCIUM SERPL-MCNC: 9.6 MG/DL (ref 8.6–10)
CHLORIDE SERPL-SCNC: 99 MMOL/L (ref 98–107)
CREAT SERPL-MCNC: 0.95 MG/DL (ref 0.67–1.17)
CREAT UR-MCNC: 22.6 MG/DL
DEPRECATED HCO3 PLAS-SCNC: 29 MMOL/L (ref 22–29)
EGFRCR SERPLBLD CKD-EPI 2021: >90 ML/MIN/1.73M2
ERYTHROCYTE [DISTWIDTH] IN BLOOD BY AUTOMATED COUNT: 12.4 % (ref 10–15)
GLUCOSE SERPL-MCNC: 199 MG/DL (ref 70–99)
HBA1C MFR BLD: 7.4 %
HCT VFR BLD AUTO: 44 % (ref 40–53)
HGB BLD-MCNC: 15.8 G/DL (ref 13.3–17.7)
MCH RBC QN AUTO: 29.8 PG (ref 26.5–33)
MCHC RBC AUTO-ENTMCNC: 35.9 G/DL (ref 31.5–36.5)
MCV RBC AUTO: 83 FL (ref 78–100)
MICROALBUMIN UR-MCNC: <12 MG/L
MICROALBUMIN/CREAT UR: NORMAL MG/G{CREAT}
PLATELET # BLD AUTO: 210 10E3/UL (ref 150–450)
POTASSIUM SERPL-SCNC: 3.9 MMOL/L (ref 3.4–5.3)
PTH-INTACT SERPL-MCNC: 95 PG/ML (ref 15–65)
RBC # BLD AUTO: 5.3 10E6/UL (ref 4.4–5.9)
SODIUM SERPL-SCNC: 138 MMOL/L (ref 135–145)
WBC # BLD AUTO: 9 10E3/UL (ref 4–11)

## 2023-11-27 PROCEDURE — 82330 ASSAY OF CALCIUM: CPT | Performed by: PATHOLOGY

## 2023-11-27 PROCEDURE — 36415 COLL VENOUS BLD VENIPUNCTURE: CPT | Performed by: PATHOLOGY

## 2023-11-27 PROCEDURE — 99000 SPECIMEN HANDLING OFFICE-LAB: CPT | Performed by: PATHOLOGY

## 2023-11-27 PROCEDURE — 80048 BASIC METABOLIC PNL TOTAL CA: CPT | Performed by: PATHOLOGY

## 2023-11-27 PROCEDURE — 85027 COMPLETE CBC AUTOMATED: CPT | Performed by: PATHOLOGY

## 2023-11-27 PROCEDURE — 83970 ASSAY OF PARATHORMONE: CPT | Performed by: PATHOLOGY

## 2023-11-27 PROCEDURE — 82570 ASSAY OF URINE CREATININE: CPT | Performed by: FAMILY MEDICINE

## 2023-11-27 PROCEDURE — 83036 HEMOGLOBIN GLYCOSYLATED A1C: CPT | Performed by: FAMILY MEDICINE

## 2023-11-27 RX ORDER — ALLOPURINOL 300 MG/1
1 TABLET ORAL DAILY
Qty: 90 TABLET | Refills: 0 | Status: SHIPPED | OUTPATIENT
Start: 2023-11-27 | End: 2024-01-26

## 2023-12-04 ENCOUNTER — VIRTUAL VISIT (OUTPATIENT)
Dept: CARDIOLOGY | Facility: CLINIC | Age: 53
End: 2023-12-04
Attending: INTERNAL MEDICINE
Payer: COMMERCIAL

## 2023-12-04 VITALS — BODY MASS INDEX: 33.6 KG/M2 | HEIGHT: 71 IN | WEIGHT: 240 LBS

## 2023-12-04 DIAGNOSIS — Z98.890 S/P MVR (MITRAL VALVE REPAIR): Primary | ICD-10-CM

## 2023-12-04 DIAGNOSIS — E78.5 DYSLIPIDEMIA: ICD-10-CM

## 2023-12-04 DIAGNOSIS — I48.11 LONGSTANDING PERSISTENT ATRIAL FIBRILLATION (H): ICD-10-CM

## 2023-12-04 DIAGNOSIS — I10 ESSENTIAL HYPERTENSION WITH GOAL BLOOD PRESSURE LESS THAN 140/90: ICD-10-CM

## 2023-12-04 PROCEDURE — 99214 OFFICE O/P EST MOD 30 MIN: CPT | Mod: 95 | Performed by: INTERNAL MEDICINE

## 2023-12-04 ASSESSMENT — PAIN SCALES - GENERAL: PAINLEVEL: NO PAIN (0)

## 2023-12-04 NOTE — PATIENT INSTRUCTIONS
You were seen in the Cardiology Clinic today by: Dr. James    Plan:   Medication Changes:   None.     Follow up Visit:  With Dr. James in 1 year, fasting labs completed prior to appointment.     Further Instructions:  Follow the American Heart Association Diet and Lifestyle recommendations: Limit saturated fat, trans fat, sodium, red meat, sweets and sugar-sweetened beverages. If you choose to eat red meat, compare labels and select the leanest cuts available. Aim for at least 150 minutes of moderate physical activity or 75 minutes of vigorous physical activity - or an equal combination of both - each week.    If you have further questions, please utilize [employer] to contact us.     Your Care Team:    Cardiology   Telephone Number     Nurse Line  Nate Ferrer RN    [phone]     For scheduling appointments:  [phone]           On-call cardiologist for after hours or on weekends:   [phone], option #4, and ask to speak to the on-call cardiologist.     Cardiovascular Clinic:   [address] [place] [address]. [place] [address]      As always, Thank you for trusting us with your health care needs!

## 2023-12-04 NOTE — PROGRESS NOTES
I had the pleasure of participating in coordination of clinical cardiovascular care for patient, Tremaine King, via [facility] virtual visit protocol.    History:    Tremaine King is a very pleasant 53 year old man with persistent atrial fibrillation, status post mitral valve repair, hypertension and hyperlipidemia.       He had a GI bleed with a hemoglobin of 6.6 in Nov /2021.  EGD showed a 10 mm deep gastric ulcer that was cauterized and injected.  The ulcer was thought secondary to use of ibuprofen 600 mg a day along with alcohol use.     He has been followed for persistent valvular atrial fibrillation.  His [id number]-[id number] score is 2 for hypertension and diabetes.     Since the last visit with Dr. Gotti, he has continued to do well.  He continues his active lifestyle going to [employer] 3 times a week, interval training workouts. He is without symptoms of chest pain or shortness of breath or other limitation. He is tolerating Eliquis without bleeding.        Current Outpatient Medications   Medication Sig Dispense Refill     albuterol (PROAIR HFA/PROVENTIL HFA/VENTOLIN HFA) 108 (90 Base) MCG/ACT inhaler Inhale 2 puffs into the lungs every 6 hours as needed for shortness of breath / dyspnea 8.5 g 0     allopurinol (ZYLOPRIM) 300 MG tablet Take 1 tablet (300 mg) by mouth daily Tremaine please call [phone] to schedule appointment, labs 90 tablet 0     amoxicillin (AMOXIL) 500 MG capsule Please take a total of 2 grams (four 500 mg capsules) 30-60 minutes prior to dental procedure. 12 capsule 3     apixaban ANTICOAGULANT (ELIQUIS) 2.5 MG tablet Take 1 tablet (2.5 mg) by mouth 2 times daily 180 tablet 1     beclomethasone HFA (QVAR REDIHALER) 80 MCG/ACT inhaler Inhale 1 puff into the lungs 2 times daily 10.6 g 1     Continuous Blood Gluc Sensor (FREESTYLE MINGO 3 SENSOR) [facility] use 1 each every 14 days 6 each 3     diltiazem ER (DILT-XR) 180 MG 24 hr capsule Take 1 capsule (180 mg) by mouth daily 90 capsule 3      furosemide (LASIX) 20 MG tablet Take 1 tablet (20 mg) by mouth daily 90 tablet 3     Insulin Glargine-yfgn (SEMGLEE, YFGN,) 100 UNIT/ML SOPN Inject 16 Units Subcutaneous At Bedtime May increase up to 16 units daily as indicated by numbers 15 mL 1     insulin pen needle (BD PEN NEEDLE ZORAIDA 2ND GEN) 32G X 4 MM miscellaneous Use 1 pen needles daily as directed. 100 each 3     metFORMIN (GLUCOPHAGE) 500 MG tablet Take 1 tablet (500 mg) by mouth 2 times daily (with meals) Tremaine please call [phone] to schedule appointment, labs 60 tablet 0     metoprolol succinate ER (TOPROL XL) 100 MG 24 hr tablet Take one and one half tablet (150mg) every day 135 tablet 3     Multiple Vitamins-Minerals (MENS 50+ MULTI VITAMIN/MIN PO) Take 1 tablet by mouth daily       order for DME Equipment being ordered: CPAP  Patient Tremaine King was set up at [place] on September 14, 2015. Patient received a [employer] DreamStation Auto CPAP Auto. Pressures were set at Auto 12 - 12 cm H2O.   Patient s ramp is 9 cm H2O for Auto and FLEX/EPR is A Flex.  Patient received a [employer] Resp[employer]ics Mask name: BLUE GEL NASAL  Nasal mask Size Medium, heated tubing and heated humidifier.  Patient is not enrolled in the STM Program and does not need to meet compliance. Samina Arora       sildenafil (REVATIO) 20 MG tablet Take 1 tablet (20 mg) by mouth daily as needed (erectile dysfunction) 12 tablet 1       Allergies - reviewed     Allergies   Allergen Reactions     Hctz [Hydrochlorothiazide]      Hypokalemia       Spironolactone      hyponatremia       Past history -reviewed  Past Medical History:   Diagnosis Date     A-fib (H)      ASD (atrial septal defect)     s/p repair     Gastric ulcer      Hypertension      Lumbar disc herniation 05/2018    better after steroid inj and PT     Spinal headache     cpap     Uncomplicated asthma         Social history - reviewed  Former smoker  Quit alcohol in 2001    FAMILY HISTORY:  No premature  coronary artery disease, aortic dissection or sudden cardiac death.      ROS: non contributory on the 10-point review of system    Exam: limited due to the nature of this visit     130/70 mmHg 62 HR  In general, the patient is in no acute distress.    Breathing is unlabored.   Neck: No jugular venous distension.    No edema per patient      Data:  4/18/2023: LDL 90,   11/27/23 - GFR normal, hemoglobin normal    Zio patch monitor 10/3/2022 [id number]% burden of AFib, average heart rate 58 beats per minute, pauses during sleeping hours, nonsustained VT.  No other arrhythmias.    Echocardiogram 11/13/23 reveals an ejection fraction of 55-60% mean gradient across the mitral valve repair of 3 mmHg, trace mitral insufficiency.    Assessment and Plan:  53 year old man with    1.  Persistent atrial fibrillation    Patient has been in A-fib since November 2021.  [id number]-QXAd 2 score is currently 2 for diabetes and hypertension.  Due to his prior history of severe life-threatening GI bleed he chooses to pursue half dose anticoagulation (also due to the interaction with the diltiazem, the actual anticoagulant effect should be slightly higher than usual)    He will also continue to abstain from alcohol and NSAID use as well.    2.  History of mitral valve repair    I have reviewed his recent echo and there is no mitral stenosis and there is trace regurgitation. Biventricular function is preserved.     3.  Hypertension well controlled:    Blood pressures currently well controlled    4.  Hyperlipidemia    Lipids currently in goal range    Recommendations:     1. Continue current medications.  2. Follow up with me 1 year      Video Visit Details:     Type of service:  Video Visit  Video Start Time: 8.03 am  Video End Time: 8.10 am  Patient location: Home  Provider location:  Off-site   Platform used for Video Visit: [employer]   In addition to visit time documented above, I spent an additional 15 minutes on data review and  documentation.      Valentina James MD, MS  Professor of Medicine  Cardiovascular Medicine

## 2023-12-04 NOTE — NURSING NOTE
Patient confirms medications and allergies are accurate via patients echeck in completion, and or denies any changes since last reviewed/verified.     Is the patient currently in the state of [place]? YES    Visit mode:VIDEO    If the visit is dropped, the patient can be reconnected by: VIDEO VISIT: Text to cell phone:   Telephone Information:   Mobile [phone]       Will anyone else be joining the visit? NO  (If patient encounters technical issues they should call [phone]    How would you like to obtain your AVS? MyChart    Are changes needed to the allergy or medication list? No    Reason for visit: RECHECK    Chari VIVAR       Yes

## 2023-12-04 NOTE — LETTER
12/4/2023      RE: Tremaine King  [address] [place]  [facility] [address]       Dear Colleague,    Thank you for the opportunity to participate in the care of your patient, Tremaine King, at the [facility] [place] at [facility]. Please see a copy of my visit note below.      I had the pleasure of participating in coordination of clinical cardiovascular care for patient, Tremaine King, via [facility]'s virtual visit protocol.    History:    Tremaine King is a very pleasant 53 year old man with persistent atrial fibrillation, status post mitral valve repair, hypertension and hyperlipidemia.       He had a GI bleed with a hemoglobin of 6.6 in Nov /2021.  EGD showed a 10 mm deep gastric ulcer that was cauterized and injected.  The ulcer was thought secondary to use of ibuprofen 600 mg a day along with alcohol use.     He has been followed for persistent valvular atrial fibrillation.  His QFU5EI8-BQKb score is 2 for hypertension and diabetes.     Since the last visit with Dr. Gotti, he has continued to do well.  He continues his active lifestyle going to [employer] 3 times a week, interval training workouts. He is without symptoms of chest pain or shortness of breath or other limitation. He is tolerating Eliquis without bleeding.        Current Outpatient Medications   Medication Sig Dispense Refill    albuterol (PROAIR HFA/PROVENTIL HFA/VENTOLIN HFA) 108 (90 Base) MCG/ACT inhaler Inhale 2 puffs into the lungs every 6 hours as needed for shortness of breath / dyspnea 8.5 g 0    allopurinol (ZYLOPRIM) 300 MG tablet Take 1 tablet (300 mg) by mouth daily Tremaine please call [phone] to schedule appointment, labs 90 tablet 0    amoxicillin (AMOXIL) 500 MG capsule Please take a total of 2 grams (four 500 mg capsules) 30-60 minutes prior to dental procedure. 12 capsule 3    apixaban ANTICOAGULANT (ELIQUIS) 2.5 MG tablet Take 1 tablet (2.5 mg) by mouth 2 times  daily 180 tablet 1    beclomethasone HFA (QVAR REDIHALER) 80 MCG/ACT inhaler Inhale 1 puff into the lungs 2 times daily 10.6 g 1    Continuous Blood Gluc Sensor (FREESTYLE MINGO 3 SENSOR) MISC use 1 each every 14 days 6 each 3    diltiazem ER (DILT-XR) 180 MG 24 hr capsule Take 1 capsule (180 mg) by mouth daily 90 capsule 3    furosemide (LASIX) 20 MG tablet Take 1 tablet (20 mg) by mouth daily 90 tablet 3    Insulin Glargine-yfgn (SEMGLEE, YFGN,) 100 UNIT/ML SOPN Inject 16 Units Subcutaneous At Bedtime May increase up to 16 units daily as indicated by numbers 15 mL 1    insulin pen needle (BD PEN NEEDLE ZORAIDA 2ND GEN) 32G X 4 MM miscellaneous Use 1 pen needles daily as directed. 100 each 3    metFORMIN (GLUCOPHAGE) 500 MG tablet Take 1 tablet (500 mg) by mouth 2 times daily (with meals) Tremaine please call [phone] to schedule appointment, labs 60 tablet 0    metoprolol succinate ER (TOPROL XL) 100 MG 24 hr tablet Take one and one half tablet (150mg) every day 135 tablet 3    Multiple Vitamins-Minerals (MENS 50+ MULTI VITAMIN/MIN PO) Take 1 tablet by mouth daily      order for DME Equipment being ordered: CPAP  Patient Tremaine King was set up at [place] on September 14, 2015. Patient received a [employer] Resp[employer]ics DreamStation Auto CPAP Auto. Pressures were set at Auto 12 - 12 cm H2O.   Patient s ramp is 9 cm H2O for Auto and FLEX/EPR is A Flex.  Patient received a [employer] Resp[employer]ics Mask name: BLUE GEL NASAL  Nasal mask Size Medium, heated tubing and heated humidifier.  Patient is not enrolled in the STM Program and does not need to meet compliance. Samina Arora      sildenafil (REVATIO) 20 MG tablet Take 1 tablet (20 mg) by mouth daily as needed (erectile dysfunction) 12 tablet 1       Allergies - reviewed     Allergies   Allergen Reactions    Hctz [Hydrochlorothiazide]      Hypokalemia      Spironolactone      hyponatremia       Past history -reviewed  Past Medical History:   Diagnosis Date    A-fib (H)      ASD (atrial septal defect)     s/p repair    Gastric ulcer     Hypertension     Lumbar disc herniation 05/2018    better after steroid inj and PT    Spinal headache     cpap    Uncomplicated asthma         Social history - reviewed  Former smoker  Quit alcohol in 2001    FAMILY HISTORY:  No premature coronary artery disease, aortic dissection or sudden cardiac death.      ROS: non contributory on the 10-point review of system    Exam: limited due to the nature of this visit     130/70 mmHg 62 HR  In general, the patient is in no acute distress.    Breathing is unlabored.   Neck: No jugular venous distension.    No edema per patient      Data:  4/18/2023: LDL 90,   11/27/23 - GFR normal, hemoglobin normal    Zio patch monitor 10/3/2022 [id number]% burden of AFib, average heart rate 58 beats per minute, pauses during sleeping hours, nonsustained VT.  No other arrhythmias.    Echocardiogram 11/13/23 reveals an ejection fraction of 55-60% mean gradient across the mitral valve repair of 3 mmHg, trace mitral insufficiency.    Assessment and Plan:  53 year old man with    1.  Persistent atrial fibrillation    Patient has been in A-fib since November 2021.  [id number]-CDYa 2 score is currently 2 for diabetes and hypertension.  Due to his prior history of severe life-threatening GI bleed he chooses to pursue half dose anticoagulation (also due to the interaction with the diltiazem, the actual anticoagulant effect should be slightly higher than usual)    He will also continue to abstain from alcohol and NSAID use as well.    2.  History of mitral valve repair    I have reviewed his recent echo and there is no mitral stenosis and there is trace regurgitation. Biventricular function is preserved.     3.  Hypertension well controlled:    Blood pressures currently well controlled    4.  Hyperlipidemia    Lipids currently in goal range    Recommendations:     Continue current medications.  Follow up with me 1 year      Video Visit  Details:     Type of service:  Video Visit  Video Start Time: 8.03 am  Video End Time: 8.10 am  Patient location: Home  Provider location:  Off-site   Platform used for Video Visit: MeetMe   In addition to visit time documented above, I spent an additional 15 minutes on data review and documentation.      Valentina James MD, MS  Professor of Medicine  Cardiovascular Medicine

## 2023-12-09 NOTE — RESULT ENCOUNTER NOTE
Shree Penaloza: Your results show-  A1c no longer at goal <7.0  Although ionized calcium was normal, parathyroid level is elevated.  Please schedule followup appointment to see me.   Thanks,     Samy KNOWLES

## 2023-12-10 ASSESSMENT — SLEEP AND FATIGUE QUESTIONNAIRES
HOW LIKELY ARE YOU TO NOD OFF OR FALL ASLEEP WHILE SITTING AND READING: WOULD NEVER DOZE
HOW LIKELY ARE YOU TO NOD OFF OR FALL ASLEEP IN A CAR, WHILE STOPPED FOR A FEW MINUTES IN TRAFFIC: WOULD NEVER DOZE
HOW LIKELY ARE YOU TO NOD OFF OR FALL ASLEEP WHEN YOU ARE A PASSENGER IN A CAR FOR AN HOUR WITHOUT A BREAK: WOULD NEVER DOZE
HOW LIKELY ARE YOU TO NOD OFF OR FALL ASLEEP WHILE SITTING AND TALKING TO SOMEONE: WOULD NEVER DOZE
HOW LIKELY ARE YOU TO NOD OFF OR FALL ASLEEP WHILE WATCHING TV: WOULD NEVER DOZE
HOW LIKELY ARE YOU TO NOD OFF OR FALL ASLEEP WHILE SITTING QUIETLY AFTER LUNCH WITHOUT ALCOHOL: WOULD NEVER DOZE
HOW LIKELY ARE YOU TO NOD OFF OR FALL ASLEEP WHILE SITTING INACTIVE IN A PUBLIC PLACE: WOULD NEVER DOZE
HOW LIKELY ARE YOU TO NOD OFF OR FALL ASLEEP WHILE LYING DOWN TO REST IN THE AFTERNOON WHEN CIRCUMSTANCES PERMIT: SLIGHT CHANCE OF DOZING

## 2023-12-13 ENCOUNTER — VIRTUAL VISIT (OUTPATIENT)
Dept: SLEEP MEDICINE | Facility: CLINIC | Age: 53
End: 2023-12-13
Payer: COMMERCIAL

## 2023-12-13 VITALS — WEIGHT: 240 LBS | HEIGHT: 71 IN | BODY MASS INDEX: 33.6 KG/M2

## 2023-12-13 DIAGNOSIS — G47.33 OSA ON CPAP: Primary | ICD-10-CM

## 2023-12-13 PROCEDURE — 99213 OFFICE O/P EST LOW 20 MIN: CPT | Mod: VID | Performed by: INTERNAL MEDICINE

## 2023-12-13 ASSESSMENT — PAIN SCALES - GENERAL: PAINLEVEL: NO PAIN (0)

## 2023-12-13 NOTE — NURSING NOTE
Is the patient currently in the state of [place]? YES    Visit mode:VIDEO    If the visit is dropped, the patient can be reconnected by: VIDEO VISIT: Text to cell phone:   Telephone Information:   Mobile [phone]       Will anyone else be joining the visit? NO  (If patient encounters technical issues they should call [phone]    How would you like to obtain your AVS? MyChart    Are changes needed to the allergy or medication list? No    Reason for visit: RECHMARK VIVAR

## 2023-12-13 NOTE — Clinical Note
Shree MCCOLLUM, please refer to the A/P part of my note. Please follow up with patient through STM and provide me with an updated DL in 1-2 months. Thanks, Karthikeyan

## 2023-12-13 NOTE — PROGRESS NOTES
Virtual Visit Details    Type of service:  Video Visit     Originating Location (pt. Location): Home    Distant Location (provider location):  Off-site  Platform used for Video Visit: [facility]  Sleep clinic follow-up visit note     Date: 12/13/23     Chief complaint:  Follow-up of KOTA, review CPAP compliance measures     Tremaine King is a 53  year old male, with PMH of atrial septal defect repair, mitral valve repair, atrial fibrillation, lumbar disc herniation, asthma hypertension and obstructive sleep apnea,  who presents to sleep clinic for follow-up of previously diagnosed  obstructive sleep apnea that is currently managed with CPAP therapy.    He was last seen at the sleep clinic in January 2022, and at that time he was using [employer] device that was recalled with pressure settings fixed at 12 cm water.   He obtained a replacement CPAP device through the [employer] about 1 year ago. The pressure settings on the device range 4-20 cmH2O. He uses nasal mask. He reports using the CPAP regularly during sleep.  There are no reports of snoring, apneas or awakenings due to gasping for air with the CPAP use.  Pressure settings feel comfortable.  His bedtime is between 1030 to 11 PM . He wakes up at 6 AM on weekdays and on weekends between 7 to 7:30 AM.  He reports feeling rested most mornings and denies fatigue or excessive daytime sleepiness.  He denies drowsiness while driving. ESS:1/24    Previous sleep study report: 2/9/2009([facility])  AHI:24/hr.  CPAP at 12 cm water was effective during REM sleep.     DOWNLOADABLE COMPLIANCE DATA:( Respironics) 4-20 cm water  From 8/22/23   through  9/20/23  reveals that he used the device for 9  out of 30 days with an averag use of 5  hours and  58 minutes on the days used.  Average time in large air leak:  0 seconds   ; Residual AHI was  0.8 per hour. Mean pressure settings: 6.3  ; peak average pressure: 8.1 and device pressure <= 90% of time: 7.7cm  water     Past medical/surgical history, family history, social history, medications and allergies were reviewed.            Physical Examination:   General: Pleasant. Cooperative. In no apparent distress.  Pulmonary: Able to speak in full sentences easily. No cough or wheeze.   Neurologic: Alert, oriented x3.  Psychiatric: Mood euthymic. Affect congruent with full range and intensity.     ASSESSMENT/PLAN:  Obstructive sleep apnea: Pt reports adequate compliance with CPAP and reports positive benefits with CPAP treatment. However, compliance DL is unavailable for last 2.5 moths and the available DL report from 8/22/23-9/20/23 shows poor compliance and that is contrary to the pt's report. Patient reports it is plausible that his device which is from [employer] was not enrolled in the [employer] system. Based on compliance measures, KOTA is adequately controlled with CPAP at the current settings.    DME orders were generated for renewal of CPAP supplies.  Recommended to continue using the CPAP regularly during sleep and getting the supplies for the CPAP replaced regularly.   Patient instructed to remember to bring PAP with him  if hospitalized and if anticipating procedure that requires sedation/surgery to be sure to discuss with the provider/surgeon that he has sleep apnea and uses PAP therapy.    He will be followed through STM program (communicate with our virtual care coordinator regarding this) and plan is to review DL data in 2 months.     Obesity: We discussed weight management with healthy diet, and exercise.     Patient was strongly advised to avoid driving, operating any heavy machinery or other hazardous situations while drowsy or sleepy.  Patient was counseled on the importance of driving while alert, to pull over if drowsy, or nap before getting into the vehicle if sleepy.      Follow-up with sleep clinic via video visit in 1 year via video visit or sooner if any concerns.     The above note was dictated using  "voice recognition software. Although reviewed after completion, some word and grammatical error may remain . Please contact the author for any clarifications.      \" Total time spent was 27 minutes  for this appointment on this date of service which include time spent before, during and after the visit for chart review, patient care, counseling and coordination of care. Including documentation\"      Gokul Greer MD  [facility]  [address] , [place] [address]      "

## 2023-12-13 NOTE — Clinical Note
Dr. Simmons, I printed off a recent DL for this patient, please see if it correlates to what patient was stating from last visit. Patient is set up with [facility].

## 2023-12-14 ENCOUNTER — MYC REFILL (OUTPATIENT)
Dept: EDUCATION SERVICES | Facility: CLINIC | Age: 53
End: 2023-12-14
Payer: COMMERCIAL

## 2023-12-14 DIAGNOSIS — E11.65 TYPE 2 DIABETES MELLITUS WITH HYPERGLYCEMIA, WITH LONG-TERM CURRENT USE OF INSULIN (H): ICD-10-CM

## 2023-12-14 DIAGNOSIS — Z79.4 TYPE 2 DIABETES MELLITUS WITH HYPERGLYCEMIA, WITH LONG-TERM CURRENT USE OF INSULIN (H): ICD-10-CM

## 2023-12-15 RX ORDER — INSULIN GLARGINE-YFGN 100 [IU]/ML
16 INJECTION, SOLUTION SUBCUTANEOUS AT BEDTIME
Qty: 15 ML | Refills: 0 | Status: SHIPPED | OUTPATIENT
Start: 2023-12-15 | End: 2024-01-26

## 2024-01-07 DIAGNOSIS — E11.9 TYPE 2 DIABETES MELLITUS WITHOUT COMPLICATION, WITHOUT LONG-TERM CURRENT USE OF INSULIN (H): ICD-10-CM

## 2024-01-19 ASSESSMENT — ASTHMA QUESTIONNAIRES
QUESTION_4 LAST FOUR WEEKS HOW OFTEN HAVE YOU USED YOUR RESCUE INHALER OR NEBULIZER MEDICATION (SUCH AS ALBUTEROL): ONCE A WEEK OR LESS
QUESTION_1 LAST FOUR WEEKS HOW MUCH OF THE TIME DID YOUR ASTHMA KEEP YOU FROM GETTING AS MUCH DONE AT WORK, SCHOOL OR AT HOME: NONE OF THE TIME
QUESTION_5 LAST FOUR WEEKS HOW WOULD YOU RATE YOUR ASTHMA CONTROL: COMPLETELY CONTROLLED
ACT_TOTALSCORE: 24
QUESTION_3 LAST FOUR WEEKS HOW OFTEN DID YOUR ASTHMA SYMPTOMS (WHEEZING, COUGHING, SHORTNESS OF BREATH, CHEST TIGHTNESS OR PAIN) WAKE YOU UP AT NIGHT OR EARLIER THAN USUAL IN THE MORNING: NOT AT ALL
ACT_TOTALSCORE: 24
QUESTION_2 LAST FOUR WEEKS HOW OFTEN HAVE YOU HAD SHORTNESS OF BREATH: NOT AT ALL

## 2024-01-19 ASSESSMENT — ENCOUNTER SYMPTOMS
EYE PAIN: 0
COUGH: 0
NAUSEA: 0
DIZZINESS: 0
ARTHRALGIAS: 0
FREQUENCY: 0
DYSURIA: 0
FEVER: 0
CONSTIPATION: 0
SORE THROAT: 0
HEARTBURN: 0
MYALGIAS: 0
DIARRHEA: 0
PALPITATIONS: 0
HEMATURIA: 0
NERVOUS/ANXIOUS: 0
JOINT SWELLING: 0
CHILLS: 0
PARESTHESIAS: 0
ABDOMINAL PAIN: 0
WEAKNESS: 0
HEADACHES: 0
HEMATOCHEZIA: 0
SHORTNESS OF BREATH: 0

## 2024-01-21 ENCOUNTER — HEALTH MAINTENANCE LETTER (OUTPATIENT)
Age: 54
End: 2024-01-21

## 2024-01-26 ENCOUNTER — OFFICE VISIT (OUTPATIENT)
Dept: FAMILY MEDICINE | Facility: CLINIC | Age: 54
End: 2024-01-26
Payer: COMMERCIAL

## 2024-01-26 VITALS
OXYGEN SATURATION: 98 % | SYSTOLIC BLOOD PRESSURE: 131 MMHG | TEMPERATURE: 97.8 F | RESPIRATION RATE: 15 BRPM | WEIGHT: 247 LBS | HEART RATE: 45 BPM | BODY MASS INDEX: 34.58 KG/M2 | DIASTOLIC BLOOD PRESSURE: 77 MMHG | HEIGHT: 71 IN

## 2024-01-26 DIAGNOSIS — E78.5 DYSLIPIDEMIA: ICD-10-CM

## 2024-01-26 DIAGNOSIS — I48.91 ATRIAL FIBRILLATION, UNSPECIFIED TYPE (H): ICD-10-CM

## 2024-01-26 DIAGNOSIS — E11.65 TYPE 2 DIABETES MELLITUS WITH HYPERGLYCEMIA, WITH LONG-TERM CURRENT USE OF INSULIN (H): ICD-10-CM

## 2024-01-26 DIAGNOSIS — M10.00 IDIOPATHIC GOUT, UNSPECIFIED CHRONICITY, UNSPECIFIED SITE: ICD-10-CM

## 2024-01-26 DIAGNOSIS — E83.52 SERUM CALCIUM ELEVATED: ICD-10-CM

## 2024-01-26 DIAGNOSIS — Z79.4 TYPE 2 DIABETES MELLITUS WITH HYPERGLYCEMIA, WITH LONG-TERM CURRENT USE OF INSULIN (H): ICD-10-CM

## 2024-01-26 DIAGNOSIS — J45.30 MILD PERSISTENT ASTHMA WITHOUT COMPLICATION: ICD-10-CM

## 2024-01-26 DIAGNOSIS — Z00.01 ENCOUNTER FOR PREVENTATIVE ADULT HEALTH CARE EXAM WITH ABNORMAL FINDINGS: Primary | ICD-10-CM

## 2024-01-26 DIAGNOSIS — Z98.890 HISTORY OF REPAIR OF MITRAL VALVE: ICD-10-CM

## 2024-01-26 DIAGNOSIS — I10 ESSENTIAL HYPERTENSION WITH GOAL BLOOD PRESSURE LESS THAN 140/90: ICD-10-CM

## 2024-01-26 DIAGNOSIS — Z23 NEED FOR VACCINATION: ICD-10-CM

## 2024-01-26 PROBLEM — E11.9 TYPE 2 DIABETES MELLITUS WITHOUT COMPLICATION, WITH LONG-TERM CURRENT USE OF INSULIN (H): Status: ACTIVE | Noted: 2022-12-09

## 2024-01-26 PROBLEM — F10.10 NONDEPENDENT ALCOHOL ABUSE, EPISODIC DRINKING BEHAVIOR: Status: RESOLVED | Noted: 2021-11-27 | Resolved: 2024-01-26

## 2024-01-26 LAB
CALCIUM SERPL-MCNC: 10.1 MG/DL (ref 8.6–10)
CHOLEST SERPL-MCNC: 171 MG/DL
FASTING STATUS PATIENT QL REPORTED: NO
HBA1C MFR BLD: 6.8 % (ref 0–5.6)
HDLC SERPL-MCNC: 38 MG/DL
LDLC SERPL CALC-MCNC: 103 MG/DL
NONHDLC SERPL-MCNC: 133 MG/DL
PSA SERPL DL<=0.01 NG/ML-MCNC: 1.71 NG/ML (ref 0–3.5)
PTH-INTACT SERPL-MCNC: 55 PG/ML (ref 15–65)
TRIGL SERPL-MCNC: 149 MG/DL
TSH SERPL DL<=0.005 MIU/L-ACNC: 1.69 UIU/ML (ref 0.3–4.2)
VIT D+METAB SERPL-MCNC: 27 NG/ML (ref 20–50)

## 2024-01-26 PROCEDURE — 84443 ASSAY THYROID STIM HORMONE: CPT | Performed by: FAMILY MEDICINE

## 2024-01-26 PROCEDURE — 80061 LIPID PANEL: CPT | Performed by: FAMILY MEDICINE

## 2024-01-26 PROCEDURE — 99396 PREV VISIT EST AGE 40-64: CPT | Mod: 25 | Performed by: FAMILY MEDICINE

## 2024-01-26 PROCEDURE — 90750 HZV VACC RECOMBINANT IM: CPT | Performed by: FAMILY MEDICINE

## 2024-01-26 PROCEDURE — 83036 HEMOGLOBIN GLYCOSYLATED A1C: CPT | Performed by: FAMILY MEDICINE

## 2024-01-26 PROCEDURE — 82310 ASSAY OF CALCIUM: CPT | Performed by: FAMILY MEDICINE

## 2024-01-26 PROCEDURE — 83970 ASSAY OF PARATHORMONE: CPT | Performed by: FAMILY MEDICINE

## 2024-01-26 PROCEDURE — 82306 VITAMIN D 25 HYDROXY: CPT | Performed by: FAMILY MEDICINE

## 2024-01-26 PROCEDURE — 91320 SARSCV2 VAC 30MCG TRS-SUC IM: CPT | Performed by: FAMILY MEDICINE

## 2024-01-26 PROCEDURE — 99214 OFFICE O/P EST MOD 30 MIN: CPT | Mod: 25 | Performed by: FAMILY MEDICINE

## 2024-01-26 PROCEDURE — 36415 COLL VENOUS BLD VENIPUNCTURE: CPT | Performed by: FAMILY MEDICINE

## 2024-01-26 PROCEDURE — G0103 PSA SCREENING: HCPCS | Performed by: FAMILY MEDICINE

## 2024-01-26 PROCEDURE — 90471 IMMUNIZATION ADMIN: CPT | Performed by: FAMILY MEDICINE

## 2024-01-26 PROCEDURE — 90480 ADMN SARSCOV2 VAC 1/ONLY CMP: CPT | Performed by: FAMILY MEDICINE

## 2024-01-26 RX ORDER — BUDESONIDE AND FORMOTEROL FUMARATE DIHYDRATE 80; 4.5 UG/1; UG/1
2 AEROSOL RESPIRATORY (INHALATION) 2 TIMES DAILY
Qty: 6.9 G | Refills: 11 | Status: SHIPPED | OUTPATIENT
Start: 2024-01-26

## 2024-01-26 RX ORDER — METOPROLOL SUCCINATE 100 MG/1
100 TABLET, EXTENDED RELEASE ORAL DAILY
Qty: 90 TABLET | Refills: 3 | Status: SHIPPED | OUTPATIENT
Start: 2024-01-26 | End: 2024-01-26

## 2024-01-26 RX ORDER — INSULIN GLARGINE-YFGN 100 [IU]/ML
16 INJECTION, SOLUTION SUBCUTANEOUS AT BEDTIME
Qty: 15 ML | Refills: 0 | Status: SHIPPED | OUTPATIENT
Start: 2024-01-26 | End: 2024-07-02

## 2024-01-26 RX ORDER — ALLOPURINOL 300 MG/1
1 TABLET ORAL DAILY
Qty: 90 TABLET | Refills: 3 | Status: SHIPPED | OUTPATIENT
Start: 2024-01-26 | End: 2024-10-01

## 2024-01-26 RX ORDER — METOPROLOL SUCCINATE 100 MG/1
100 TABLET, EXTENDED RELEASE ORAL DAILY
Qty: 90 TABLET | Refills: 3 | Status: SHIPPED | OUTPATIENT
Start: 2024-01-26 | End: 2024-10-01

## 2024-01-26 ASSESSMENT — ENCOUNTER SYMPTOMS
MYALGIAS: 0
ABDOMINAL PAIN: 0
FEVER: 0
CHILLS: 0
COUGH: 0
CONSTIPATION: 0
DIZZINESS: 0
DIARRHEA: 0
NUMBNESS: 1
HEADACHES: 0
HEMATURIA: 0
FREQUENCY: 0
JOINT SWELLING: 0
NERVOUS/ANXIOUS: 0
NAUSEA: 0
SHORTNESS OF BREATH: 0
EYE PAIN: 0
DYSURIA: 0
WEAKNESS: 0
SORE THROAT: 0
ARTHRALGIAS: 0
PALPITATIONS: 0

## 2024-01-26 ASSESSMENT — PATIENT HEALTH QUESTIONNAIRE - PHQ9
SUM OF ALL RESPONSES TO PHQ QUESTIONS 1-9: 0
SUM OF ALL RESPONSES TO PHQ QUESTIONS 1-9: 0

## 2024-01-26 ASSESSMENT — PAIN SCALES - GENERAL: PAINLEVEL: NO PAIN (0)

## 2024-01-26 NOTE — LETTER
My Asthma Action Plan    Name: Tremaine King   YOB: 1970  Date: 1/26/2024   My doctor: Samy Marcelino MD   My clinic: [facility]        My Control Medicine: None  My Rescue Medicine: Albuterol (Proair/Ventolin/Proventil HFA) 2-4 puffs EVERY 4 HOURS as needed. Use a spacer if recommended by your provider.   My Asthma Severity:   Mild Persistent  Know your asthma triggers: exercise or sports and cold air              GREEN ZONE   Good Control  I feel good  No cough or wheeze  Can work, sleep and play without asthma symptoms       Take your asthma control medicine every day.     If exercise triggers your asthma, take your rescue medication  15 minutes before exercise or sports, and  During exercise if you have asthma symptoms  Spacer to use with inhaler: If you have a spacer, make sure to use it with your inhaler             YELLOW ZONE Getting Worse  I have ANY of these:  I do not feel good  Cough or wheeze  Chest feels tight  Wake up at night   Keep taking your Green Zone medications  Start taking your rescue medicine:  every 20 minutes for up to 1 hour. Then every 4 hours for 24-48 hours.  If you stay in the Yellow Zone for more than 12-24 hours, contact your doctor.  If you do not return to the Green Zone in 12-24 hours or you get worse, start taking your oral steroid medicine if prescribed by your provider.           RED ZONE Medical Alert - Get Help  I have ANY of these:  I feel awful  Medicine is not helping  Breathing getting harder  Trouble walking or talking  Nose opens wide to breathe       Take your rescue medicine NOW  If your provider has prescribed an oral steroid medicine, start taking it NOW  Call your doctor NOW  If you are still in the Red Zone after 20 minutes and you have not reached your doctor:  Take your rescue medicine again and  Call 911 or go to the emergency room right away    See your regular doctor within 2 weeks of an Emergency Room or Urgent  Care visit for follow-up treatment.          Annual Reminders:  Meet with Asthma Educator,  Flu Shot in the Fall, consider Pneumonia Vaccination for patients with asthma (aged 19 and older).    Pharmacy: [id number] [address]    Electronically signed by Samy Marcelino MD   Date: 01/26/24                      Asthma Triggers  How To Control Things That Make Your Asthma Worse    Triggers are things that make your asthma worse.  Look at the list below to help you find your triggers and what you can do about them.  You can help prevent asthma flare-ups by staying away from your triggers.      Trigger                                                          What you can do   Cigarette Smoke  Tobacco smoke can make asthma worse. Do not allow smoking in your home, car or around you.  Be sure no one smokes at a child s day care or school.  If you smoke, ask your health care provider for ways to help you quit.  Ask family members to quit too.  Ask your health care provider for a referral to Quit Plan to help you quit smoking, or call 9-965-948-PLAN.     Colds, Flu, Bronchitis  These are common triggers of asthma. Wash your hands often.  Don t touch your eyes, nose or mouth.  Get a flu shot every year.     Dust Mites  These are tiny bugs that live in cloth or carpet. They are too small to see. Wash sheets and blankets in hot water every week.   Encase pillows and mattress in dust mite proof covers.  Avoid having carpet if you can. If you have carpet, vacuum weekly.   Use a dust mask and HEPA vacuum.   Pollen and Outdoor Mold  Some people are allergic to trees, grass, or weed pollen, or molds. Try to keep your windows closed.  Limit time out doors when pollen count is high.   Ask you health care provider about taking medicine during allergy season.     Animal Dander  Some people are allergic to skin flakes, urine or saliva from pets with fur or feathers. Keep pets with fur or  feathers out of your home.    If you can t keep the pet outdoors, then keep the pet out of your bedroom.  Keep the bedroom door closed.  Keep pets off cloth furniture and away from stuffed toys.     Mice, Rats, and Cockroaches   Some people are allergic to the waste from these pests.   Cover food and garbage.  Clean up spills and food crumbs.  Store grease in the refrigerator.   Keep food out of the bedroom.   Indoor Mold  This can be a trigger if your home has high moisture. Fix leaking faucets, pipes, or other sources of water.   Clean moldy surfaces.  Dehumidify basement if it is damp and smelly.   Smoke, Strong Odors, and Sprays  These can reduce air quality. Stay away from strong odors and sprays, such as perfume, powder, hair spray, paints, smoke incense, paint, cleaning products, candles and new carpet.   Exercise or Sports  Some people with asthma have this trigger. Be active!  Ask your doctor about taking medicine before sports or exercise to prevent symptoms.    Warm up for 5-10 minutes before and after sports or exercise.     Other Triggers of Asthma  Cold air:  Cover your nose and mouth with a scarf.  Sometimes laughing or crying can be a trigger.  Some medicines and food can trigger asthma.

## 2024-01-26 NOTE — PATIENT INSTRUCTIONS
Will change metoprolol dose from 150 mg to 100 mg daily due to low heart rate. Alert clinic for any palpitations or elevated heart rate, chest pain, or activity intolerance.    Continue insulin and metformin without changes    Will check labs again today for elevated parathyroid hormone, calcium, thyroid, and vitamin D, and prostate cancer screening    Vaccines for shingles and covid given today

## 2024-01-27 PROBLEM — E66.01 MORBID OBESITY (H): Status: RESOLVED | Noted: 2021-10-06 | Resolved: 2024-01-27

## 2024-01-27 NOTE — RESULT ENCOUNTER NOTE
Shree Penaloza: Your recent results look good; recommend followup 4-6 months for A1c and calcium. Contact if questions; nice to see you!     Samy KNOWLES

## 2024-01-29 ENCOUNTER — DOCUMENTATION ONLY (OUTPATIENT)
Dept: SLEEP MEDICINE | Facility: CLINIC | Age: 54
End: 2024-01-29
Payer: COMMERCIAL

## 2024-01-29 NOTE — Clinical Note
You requested follow up on this pt. He is compliant with AHI <5. Download in synopsis. VM left for pt if he has any concerns. Thank you

## 2024-01-29 NOTE — PROGRESS NOTES
STM Recheck. Called patient to check in on their progress with CPAP. Left voice message for patient to call back if needed.     Pt is compliant with AHI <5.

## 2024-02-14 ENCOUNTER — VIRTUAL VISIT (OUTPATIENT)
Dept: PHARMACY | Facility: CLINIC | Age: 54
End: 2024-02-14
Attending: FAMILY MEDICINE
Payer: COMMERCIAL

## 2024-02-14 DIAGNOSIS — E11.65 TYPE 2 DIABETES MELLITUS WITH HYPERGLYCEMIA, WITHOUT LONG-TERM CURRENT USE OF INSULIN (H): Primary | ICD-10-CM

## 2024-02-14 PROCEDURE — 99207 PR NO CHARGE LOS: CPT | Mod: 95 | Performed by: PHARMACIST

## 2024-02-14 RX ORDER — TIRZEPATIDE 2.5 MG/.5ML
2.5 INJECTION, SOLUTION SUBCUTANEOUS
Qty: 2 ML | Refills: 0 | Status: SHIPPED | OUTPATIENT
Start: 2024-02-14 | End: 2024-03-13

## 2024-02-14 RX ORDER — TIRZEPATIDE 5 MG/.5ML
5 INJECTION, SOLUTION SUBCUTANEOUS
Qty: 2 ML | Refills: 1 | Status: SHIPPED | OUTPATIENT
Start: 2024-03-13 | End: 2024-05-06

## 2024-02-14 NOTE — PROGRESS NOTES
Medication Therapy Management (MTM) Encounter    ASSESSMENT:                            Medication Adherence/Access: No issues identified    Diabetes:   Patient is meeting A1c goal of < 7%.  Patient is meeting goal of > 70% time in target with continuous glucose monitoring.   Patient would benefit from starting GLP-1 agonist, may be able to replace basal insulin. For most weight reduction benefit, consider tirzepatide - patient is agreeable. If covered by insurance and affordable, will stop basal insulin due to risk of hypoglycemia and start tirzepatide. Adjust use of insulin as needed for hyperglycemia as tirzepatide is titrated.       PLAN:                            Pending insurance coverage and affordability:  Start Mounjaro (tirzepatide) 2.5mg every 7 days x4 weeks THEN increase to 5mg every 7 days    Stop insulin glargine    Follow-up: 6 weeks    SUBJECTIVE/OBJECTIVE:                          Tremaine King is a 53 year old male contacted via secure video for an initial visit. He was referred to me from Samy Marcelino.      Reason for visit: review diabetes medication.    Allergies/ADRs: Reviewed in chart  Past Medical History: Reviewed in chart  Tobacco: He reports that he quit smoking about 35 years ago. His smoking use included cigars. He has never used smokeless tobacco.  Alcohol: not currently using    Medication Adherence/Access: no issues reported    Diabetes   Semglee 16 units daily  Metformin 500mg twice a day  Aspirin 81mg daily  Patient is not experiencing side effects.  He is interested in discussing an alternative medication to insulin.     Blood sugar monitoring: Continuous Glucose Monitor see AGP below      Current diabetes symptoms: none  Diet/Exercise: occasional cravings for sweets, eats 3 meals a day and low carb snack.     Eye exam in the last 12 months? Yes    Foot exam: due  Urine Albumin:   Lab Results   Component Value Date    ALCR  11/27/2023      Comment:      Unable to  calculate, urine albumin and/or urine creatinine is outside detectable limits.  Microalbuminuria is defined as an albumin:creatinine ratio of 17 to 299 for males and 25 to 299 for females. A ratio of albumin:creatinine of 300 or higher is indicative of overt proteinuria.  Due to biologic variability, positive results should be confirmed by a second, first-morning random or 24-hour timed urine specimen. If there is discrepancy, a third specimen is recommended. When 2 out of 3 results are in the microalbuminuria range, this is evidence for incipient nephropathy and warrants increased efforts at glucose control, blood pressure control, and institution of therapy with an angiotensin-converting-enzyme (ACE) inhibitor (if the patient can tolerate it).        Lab Results   Component Value Date    A1C 6.8 (H) 01/26/2024     No other medication concerns today; deferred comprehensive medication review to another visit.     Today's Vitals: There were no vitals taken for this visit.  ----------------      I spent 15 minutes with this patient today. All changes were made via collaborative practice agreement with Samy Marcelino MD. A copy of the visit note was provided to the patient's provider(s).    A summary of these recommendations was sent via [employer].    Adelaide Watkins, PharmD, BCACP   Medication Therapy Management Pharmacist   [facility]  [phone]     Telemedicine Visit Details  Type of service:  Video Conference via [employer]  Start Time: 2:36 PM  End Time: 2:49 PM     Medication Therapy Recommendations  Type 2 diabetes mellitus with hyperglycemia, without long-term current use of insulin (H)    Current Medication: Insulin Glargine-yfgn (SEMGLEE, YFSAIDA,) 100 UNIT/ML SOPN   Rationale: Patient prefers not to take - Adherence - Adherence   Recommendation: Change Medication - Mounjaro 2.5 MG/0.5ML Sopn   Status: Accepted per CPA

## 2024-02-20 DIAGNOSIS — E11.9 TYPE 2 DIABETES MELLITUS WITHOUT COMPLICATION, WITHOUT LONG-TERM CURRENT USE OF INSULIN (H): ICD-10-CM

## 2024-02-20 RX ORDER — FLASH GLUCOSE SENSOR
KIT MISCELLANEOUS
Qty: 6 EACH | Refills: 3 | Status: SHIPPED | OUTPATIENT
Start: 2024-02-20 | End: 2024-02-21

## 2024-02-21 RX ORDER — BLOOD-GLUCOSE SENSOR
1 EACH MISCELLANEOUS
Qty: 6 EACH | Refills: 3 | Status: SHIPPED | OUTPATIENT
Start: 2024-02-21

## 2024-02-21 NOTE — PROGRESS NOTES
Patient uses Sumanth 3 system - cancelled Sumanth sensor Rx and resent as Libre3  Adelaide Watkins, PharmD, BCACP   Medication Therapy Management Pharmacist   [facility]  [phone]

## 2024-02-21 NOTE — PATIENT INSTRUCTIONS
"Recommendations from today's MTM visit:                                                    MTM (medication therapy management) is a service provided by a clinical pharmacist designed to help you get the most of out of your medicines.   Today we reviewed what your medicines are for, how to know if they are working, that your medicines are safe and how to make your medicine regimen as easy as possible.      Pending insurance coverage and affordability:  Start Mounjaro (tirzepatide) 2.5mg every 7 days x4 weeks THEN increase to 5mg every 7 days    Stop insulin glargine    Follow-up: check in over MyChart, I'd like to review your blood sugars again in 6 weeks but let me know if you need me to look sooner    It was great speaking with you today.  I value your experience and would be very thankful for your time in providing feedback in our clinic survey. In the next few days, you may receive an email or text message from [employer] with a link to a survey related to your  clinical pharmacist.\"     To schedule another MTM appointment, please call the clinic directly or you may call the MTM scheduling line at [phone] or toll-free at [phone].     My Clinical Pharmacist's contact information:                                                      Please feel free to contact me with any questions or concerns you have.      Adelaide Watkins, PharmD, BCACP   Medication Therapy Management Pharmacist   [facility]  [phone]    "

## 2024-02-22 ENCOUNTER — MYC MEDICAL ADVICE (OUTPATIENT)
Dept: FAMILY MEDICINE | Facility: CLINIC | Age: 54
End: 2024-02-22
Payer: COMMERCIAL

## 2024-02-22 NOTE — TELEPHONE ENCOUNTER
Per pt neal called pharmacy and they said they do have his audra 3 that they are working on currently getting filled for him. Relayed message to pt.    Chu Carey RN   [facility]

## 2024-03-14 ENCOUNTER — MYC REFILL (OUTPATIENT)
Dept: PHARMACY | Facility: CLINIC | Age: 54
End: 2024-03-14
Payer: COMMERCIAL

## 2024-03-14 DIAGNOSIS — E11.65 TYPE 2 DIABETES MELLITUS WITH HYPERGLYCEMIA, WITHOUT LONG-TERM CURRENT USE OF INSULIN (H): ICD-10-CM

## 2024-03-14 RX ORDER — TIRZEPATIDE 5 MG/.5ML
5 INJECTION, SOLUTION SUBCUTANEOUS
Qty: 2 ML | Refills: 1 | OUTPATIENT
Start: 2024-03-14

## 2024-03-15 ENCOUNTER — LAB (OUTPATIENT)
Dept: LAB | Facility: CLINIC | Age: 54
End: 2024-03-15
Payer: COMMERCIAL

## 2024-03-15 LAB — HBA1C MFR BLD: 6.4 % (ref 0–5.6)

## 2024-03-15 PROCEDURE — 36415 COLL VENOUS BLD VENIPUNCTURE: CPT

## 2024-03-15 PROCEDURE — 83036 HEMOGLOBIN GLYCOSYLATED A1C: CPT

## 2024-03-16 NOTE — RESULT ENCOUNTER NOTE
Shree Penaloza, your recent results are back and are all normal. Please contact if any questions.   Samy Marcelino MD

## 2024-05-06 DIAGNOSIS — E11.65 TYPE 2 DIABETES MELLITUS WITH HYPERGLYCEMIA, WITHOUT LONG-TERM CURRENT USE OF INSULIN (H): ICD-10-CM

## 2024-05-06 RX ORDER — TIRZEPATIDE 5 MG/.5ML
5 INJECTION, SOLUTION SUBCUTANEOUS
Qty: 2 ML | Refills: 0 | Status: SHIPPED | OUTPATIENT
Start: 2024-05-06 | End: 2024-06-10

## 2024-05-08 DIAGNOSIS — I48.11 LONGSTANDING PERSISTENT ATRIAL FIBRILLATION (H): ICD-10-CM

## 2024-05-09 ENCOUNTER — TRANSFERRED RECORDS (OUTPATIENT)
Dept: HEALTH INFORMATION MANAGEMENT | Facility: CLINIC | Age: 54
End: 2024-05-09
Payer: COMMERCIAL

## 2024-05-09 ENCOUNTER — TRANSFERRED RECORDS (OUTPATIENT)
Dept: MULTI SPECIALTY CLINIC | Facility: CLINIC | Age: 54
End: 2024-05-09

## 2024-05-09 LAB — RETINOPATHY: NORMAL

## 2024-05-16 ENCOUNTER — OFFICE VISIT (OUTPATIENT)
Dept: RESEARCH | Facility: CLINIC | Age: 54
End: 2024-05-16
Payer: COMMERCIAL

## 2024-05-16 VITALS
DIASTOLIC BLOOD PRESSURE: 79 MMHG | BODY MASS INDEX: 30.8 KG/M2 | OXYGEN SATURATION: 99 % | RESPIRATION RATE: 16 BRPM | SYSTOLIC BLOOD PRESSURE: 130 MMHG | TEMPERATURE: 98.5 F | WEIGHT: 220 LBS | HEIGHT: 71 IN | HEART RATE: 43 BPM

## 2024-05-16 DIAGNOSIS — Z00.6 EXAMINATION OF PARTICIPANT IN CLINICAL TRIAL: Primary | ICD-10-CM

## 2024-05-16 LAB
ATRIAL RATE - MUSE: 56 BPM
ATRIAL RATE - MUSE: 87 BPM
DIASTOLIC BLOOD PRESSURE - MUSE: NORMAL MMHG
DIASTOLIC BLOOD PRESSURE - MUSE: NORMAL MMHG
INTERPRETATION ECG - MUSE: NORMAL
INTERPRETATION ECG - MUSE: NORMAL
P AXIS - MUSE: NORMAL DEGREES
P AXIS - MUSE: NORMAL DEGREES
PR INTERVAL - MUSE: NORMAL MS
PR INTERVAL - MUSE: NORMAL MS
QRS DURATION - MUSE: 122 MS
QRS DURATION - MUSE: 126 MS
QT - MUSE: 462 MS
QT - MUSE: 490 MS
QTC - MUSE: 418 MS
QTC - MUSE: 488 MS
R AXIS - MUSE: -47 DEGREES
R AXIS - MUSE: -52 DEGREES
SYSTOLIC BLOOD PRESSURE - MUSE: NORMAL MMHG
SYSTOLIC BLOOD PRESSURE - MUSE: NORMAL MMHG
T AXIS - MUSE: -15 DEGREES
T AXIS - MUSE: 55 DEGREES
VENTRICULAR RATE- MUSE: 44 BPM
VENTRICULAR RATE- MUSE: 67 BPM

## 2024-05-16 PROCEDURE — 99207 PR NO CHARGE NURSE ONLY: CPT

## 2024-05-16 PROCEDURE — 93005 ELECTROCARDIOGRAM TRACING: CPT

## 2024-05-16 PROCEDURE — 93000 ELECTROCARDIOGRAM COMPLETE: CPT | Mod: RTG | Performed by: INTERNAL MEDICINE

## 2024-05-16 PROCEDURE — 93010 ELECTROCARDIOGRAM REPORT: CPT | Performed by: INTERNAL MEDICINE

## 2024-05-16 RX ORDER — DILTIAZEM HYDROCHLORIDE 180 MG/1
180 CAPSULE, EXTENDED RELEASE ORAL DAILY
Qty: 90 CAPSULE | Refills: 0 | Status: SHIPPED | OUTPATIENT
Start: 2024-05-16 | End: 2024-09-05

## 2024-05-16 NOTE — PROGRESS NOTES
Study Description: The objective of this study is to evaluate the safety and performance of the WHOOP ECG feature in adults (22 years or older) with normal sinus rhythm or diagnosed with AF.    Post Exercise Assessment: Tremaine SHEETS Christine  denies symptoms post exercise a 12 lead ECG was completed and reviewed.     Electrocardiogram has returned to baseline and Tremaine King was released from the clinical research unit and completed the study.     16 -MAY- 2024    Trang Tovar PA-C

## 2024-05-16 NOTE — PROGRESS NOTES
Whoop Study Consent    Study Description: The objective of this study is to evaluate the safety and performance of the WHOOP ECG feature in adults (22 years or older) with normal sinus rhythm or diagnosed with AF.      CONSENT     Tremaine King a 53 year old male, was on-site today to discuss participation in the Whoop Study.       The consent form was reviewed with the patient.     The review of the study included:  Study Purpose    Participant Responsibilities    Study Data and Devices    Benefits and Risks of Participation    Compensation and Costs of Participation    Voluntary Participation    Confidentiality    Injury and Legal Rights      Protocol Version: 3.0 (Version Date 30-Apr-2024)    Screening Number: [id number] - [id number]    The subject was queried in regards to his willingness to continue and his questions were answered to his satisfaction.   The patient has given his agreement to volunteer and participate in the above noted study.   The eConsent and HIPAA form version 2.0 (Version Date 12-Apr-2024) was signed  on  16-MAY-2024 with the Clinical Research Unit of [facility].     A copy of the consent will be placed in subject's medical record. A copy of the consent form was given to the subject today.    Study data is directly entered into Epic and [employer] Software per protocol.     No study procedures were done prior to Tremaine King providing informed consent.     Has this subject had a previous screen failure in this study? No    If yes, previous Subject Number: NA     Study Phase: Phase 2    16-MAY-2024    Daniela Tobar

## 2024-05-16 NOTE — TELEPHONE ENCOUNTER
diltiazem ER (DILT-XR) 180 MG 24 hr capsule 90 capsule 3 5/17/2023       Last Office Visit: 12/4/23  Future Office visit:   NONE    Calcium Channel Blockers Protocol  [id number]/2024 02:01 AM   Protocol Details Normal ALT in past 12 months      LAST ALT 10/6/21    90 day lana refill sent to the pharmacy. Overdue labs per the refill protocol     Radha Martinez RN  P Red Flag Triage/MRT

## 2024-05-16 NOTE — PROGRESS NOTES
Whoop Study    Study Description: The OggiFinogiop ECG feature is a software-only mobile medical application intended for use with the Whoop strap to create, record, store, transfer and display a single-channel electrocardiogram (ECG) qualitatively similar to a lead I ECG.    SCREENING       Demographic Info  Tremaine King   1970          53 year old  male    Woman of Child Bearing Potential?  N/A (Male)   If no, Why? N/A    Race: White/  Ethnicity: Not  or      Time Seated/Time of Assessment: 08:44:00 HH:MM:SS    Heart Abnormalities:  History of Heart Rhythm Abnormalities? Yes Specify Below  AF long-standing (>12 months duration) Onset Year: 2021     Past Medical History:   Diagnosis Date    Atrial fibrillation; ongoing 11/10/2021    ASD (atrial septal defect); Ongoing  Right bundle branch block; Ongoing 1/1/1963 8/4/2005    Type 2 diabetes mellitus; Ongoing                                                                                    12/9/2021    Hypertension; Ongoing 8/29/2016   Mitral valve repair Start and End Date: 06/UN/2005  Late  Note: Mitral valve repair added later to medical history to indicate why the subject had a healed sternotomy scar on physical exam. -LZ 09-JUL-2024    The patient has no known drug allergies.       Current Outpatient Medications:   Medication Name (Generic) Indication Start Date Ongoing? Dose Unit Frequency Route   Semaglutide  Type 2 diabetes mellitus 5/14/2024 Yes 1  mg Once Weekly Subcutaneous    Apixaban Atrial fibrillation 11/6/2023 Yes 2.5 mg BID Oral   Diltiazem Atrial fibrillation 5/17/2023 Yes 180 mg QD Oral   Furosemide Atrial fibrillation 11/6/2023 Yes 20 mg QD Oral   Metformin Type 2 diabetes mellitus 1/26/2024 Yes 500 mg BID Oral   Metoprolol succinate Atrial fibrillation 1/26/2024 Yes 100 mg QD Oral       Lifestyle Habits  How often do you...  Exercise: Frequently (a few times a week)  Consume Caffeine:Daily - Heavy (more than  "1 serving a day)  Use Tobacco/Nicotine: WhoopTobacco: Never  Consume Alcohol: Former  Use Recreational Drugs: WhoopDrugs: Rarely (few times a year)    Dominant Hand: Right  Preferred Band Hand: Left  Reason for Choosing Band Hand: Subject preference    Skin Fold Thickness: 4.0 mm  Band Wrist Circumference: 172 mm  Wrist Hairiness: Fine, low density    Does the subject have tattoos, moles, or scars on band wrist? No  Esparza Skin Tone: Esparza: 3    Vitals Assessment Time 08:55:00  /79 (BP Location: Right arm, Patient Position: Sitting, Cuff Size: Adult Large)   Pulse (!) 43   Temp 98.5  F (36.9  C)   Resp 16   Ht 1.803 m (5' 11\")   Wt 99.8 kg (220 lb)   SpO2 99%   BMI 30.68 kg/m      Wait 1 minute between repeat measurements   Heart Rate (bpm) SpO2 (%) Blood Pressure     Assessment Time Result Result  Result    2nd Measurement 08:57:00 50 100 122/74   3rd Measurement 09:00:00 44 100 126/83       Please see Physical Examination note for Screening ECG interpretation.    ECG Rhythm Strip Time 09:09:35    Physical Exam Time of Assessment is equivalent to ECG time as MOE is present for the ECG recording and continues their assessment thereafter.     ENROLLMENT     Subject has met all requirements and is Enrolled in the Study?: Yes    Enrollment Number: [id number]   [employer] User ID: [id number]     DATA COLLECTION     Device Information    Study mobile device and laptop synchronized? Yes    Comparative (Holter) device recorder date and time set according to local clock? Yes    Bootstrap Software Kit 5: Strap SN: [id number]  Study Phone: [phone]     Was the WHOOP Strap Applied? Yes  Time of Application: 09:51:00     Subject reviewed [employer] device instructions for use? Yes    Comparative (Holter) ECG device applied? Yes  Time of  Holter Application: 10:01:00    Comparative Holter Kit IDs (insert name SmartAnnika)  Study Laptop Device  [id number]  Holter Device S/N  M12R - [id number]      Lead Placement QC Performed by:  Pasquale" G.    Practice Strap ECG Taken? Yes  Number of Practice Trials: 1     Data recording for ECG-Resting and ECG-Exercise sections- were directly entered into EDC during study visit.       END OF STUDY     Was the wrist device removed after all the trials? Yes  Was the 12 lead ECG Holter detached after all the trials? Yes    Any Adverse Events? No  Any Protocol Deviations? No    Any changes in medication since screening visit? No  Any device deficiencies? No If yes complete a device deficiency note    Is all study data for this visit collected? Yes      Date Subject Exited the Study: 16-MAY-2024  Time Subject Exited the Study: 10:49:00     Did the subject successfully complete the study? Yes   If no, Why? N/A        16-MAY-2024    Daniela Tobar

## 2024-05-16 NOTE — PROGRESS NOTES
"    Study Physical Exam      Medical History Reviewed? Yes    Physical Examination  For abnormal findings, please evaluate if the finding is Clinically Significant (by 'CS') or Not Clinically Significant (by 'NCS')  General Appearance   Normal  Head and Neck   Normal  Eyes     Normal  Ears, Nose Mouth and Throat  Normal  Cardiovascular   Abnormal; NCS due to history of atrial fibrillation irregular rhythm; bradycardic; well healed midline sternotomy  Respiratory    Normal  Skin and Hair of Wrists  Normal      Physical disability that presents safe or adequate testing: Not present  Dermatological conditions that preclude wearing of sensor or satisfactory data acquisition: Not present  Tremor: Not present  Other: Normal    Vitals:    05/16/24 0855   BP: 130/79   BP Location: Right arm   Patient Position: Sitting   Cuff Size: Adult Large   Pulse: (!) 43   Resp: 16   Temp: 98.5  F (36.9  C)   SpO2: 99%   Weight: 99.8 kg (220 lb)   Height: 1.803 m (5' 11\")                Electrocardiogram Interpretation:   12 Lead Interpretation: Atrial Fibrillation ; RBBB      16-MAY-2024    Trang Tovar PA-C      "

## 2024-05-16 NOTE — PROGRESS NOTES
[facility] Inclusion/Exclusion Criteria:    Study Name: [facility]   : Ann Marie Magaña MD      Study Description: The [employer] ECG feature is a software-only mobile medical application intended for use with the Whoop strap to create, record, store, transfer and display a single-channel electrocardiogram (ECG) qualitatively similar to a lead I ECG.     Protocol Version: 3.0 (30-Apr-2024)  Consent Version: 2.0 ( 12-Apr-2024 )    Criteria #  Inclusion Criteria (ALL MUST BE YES)  YES/NO/N/A   1  Aged 22 years or older  Yes   2 Ability to provide informed consent Yes   3 Willing to participate and to follow the procedures per the Principle Investigator's instructions Yes   4  Resides in the [place]    Yes   5   Wrist circumference: 130 mm to 245 mm at band wear position Yes   6    Previous medical history of persistent, permanent, or chronic AF and being in AF at the time of enrollment (AF Cohort Only)    Yes   7     No known history of AF and being in normal sinus rhythm at the time of enrollment (SR Cohort Only) NA             Criteria # Exclusion Criteria (ALL MUST BE NO) YES/NO/N/A   1  Subjects with an implantable pacemaker device or implantable cardioverter-defibrillator device No   2 Medical history of a life-threatening cardiac arrhythmia eg., Ventricular tachycardia or fibrillation No   3  Any known allergies to medical adhesives, isopropyl alcohol, or ECG patch    No   4  Any known allergy or or sensitivity to thermoplastics, metals with PVD coatings or Elastane used in the wrist fitness device  No   5  Clinically significant body tremors that compromise study measurements    No   6  Pregnant at the time of enrollment   No   7  Any physical disability that prevents safe and adequate testing    No   8  Physical or medical impairments that preclude exercise testing, including, but not limited to, back pain and leg claudication No   9  Mental impairment as determined by the Investigator or  designee    No   10  Subjects with any medical history, physical examination finding, vital sign or other finding or assessment that could compromise subject safety, study integrity or accurate that could compromise subject safety, study integrity or accurate assessment of study objectives. This includes, but is not limited to, known untreated medical conditions that may be considered clinically significant, such as significant anemia, electrolyte imbalance, untreated or uncontrolled thyroid disease, and open wound(s) in the areas of test band positioning   No   11    Vital sign measurements, medical history of physical examination finding that makes the subject inappropriate for participation according to the investigator * No   12    Scarring, tattoos, large, pigmented moles or other skin pathology in the area of sensor location  No   13    Severe skin conditions on either wrist, that would preclude wearing the sensor. Severe symptomatic skin injury, disorder, allergy or disease such as eczema, rosacea, impetigo, dermatomyositis, or contact dermatitis on wrists or other areas where sensors or electrodes will be placed  No   14    Clinically significant hand tremors as judged by the Investigator No   15  Participated in Phase 1 of the study (Only for Phase 2 cohort)  No   * If subject is a screen fail due to PE findings, choose whichever exclusion criteria matches that finding in addition to E10.     Patient does fulfill study inclusion criteria and no exclusion criteria are found.     Ann Marie Magaña MD    16-MAY-2024    Daniela Tobar

## 2024-05-17 DIAGNOSIS — I48.11 LONGSTANDING PERSISTENT ATRIAL FIBRILLATION (H): ICD-10-CM

## 2024-05-20 ENCOUNTER — TELEPHONE (OUTPATIENT)
Dept: CARDIOLOGY | Facility: CLINIC | Age: 54
End: 2024-05-20
Payer: COMMERCIAL

## 2024-05-20 RX ORDER — APIXABAN 2.5 MG/1
2.5 TABLET, FILM COATED ORAL 2 TIMES DAILY
Qty: 180 TABLET | Refills: 3 | Status: SHIPPED | OUTPATIENT
Start: 2024-05-20

## 2024-05-20 NOTE — TELEPHONE ENCOUNTER
Health Call Center    Phone Message    May a detailed message be left on voicemail: yes     Reason for Call: Medication Refill Request    Has the patient contacted the pharmacy for the refill? Yes   Name of medication being requested: apixaban ANTICOAGULANT (ELIQUIS) 2.5 MG tablet   Provider who prescribed the medication:   Pharmacy: [id number] - [place], [place] - [address]    Date medication is needed: 5/21   Pt has 1 day worth of tablets left.    Action Taken: Other: Cardiology    Travel Screening: Not Applicable    Thank you!  [facility]

## 2024-06-09 DIAGNOSIS — E11.65 TYPE 2 DIABETES MELLITUS WITH HYPERGLYCEMIA, WITHOUT LONG-TERM CURRENT USE OF INSULIN (H): ICD-10-CM

## 2024-06-10 RX ORDER — TIRZEPATIDE 5 MG/.5ML
5 INJECTION, SOLUTION SUBCUTANEOUS
Qty: 2 ML | Refills: 0 | Status: SHIPPED | OUTPATIENT
Start: 2024-06-10 | End: 2024-07-08

## 2024-06-22 ENCOUNTER — LAB (OUTPATIENT)
Dept: LAB | Facility: CLINIC | Age: 54
End: 2024-06-22
Payer: COMMERCIAL

## 2024-06-22 DIAGNOSIS — Z79.4 TYPE 2 DIABETES MELLITUS WITHOUT COMPLICATION, WITH LONG-TERM CURRENT USE OF INSULIN (H): Primary | ICD-10-CM

## 2024-06-22 DIAGNOSIS — E11.9 TYPE 2 DIABETES MELLITUS WITHOUT COMPLICATION, WITH LONG-TERM CURRENT USE OF INSULIN (H): Primary | ICD-10-CM

## 2024-06-22 LAB — HBA1C MFR BLD: 5.5 % (ref 0–5.6)

## 2024-06-22 PROCEDURE — 36415 COLL VENOUS BLD VENIPUNCTURE: CPT

## 2024-06-22 PROCEDURE — 83036 HEMOGLOBIN GLYCOSYLATED A1C: CPT

## 2024-07-07 DIAGNOSIS — E11.65 TYPE 2 DIABETES MELLITUS WITH HYPERGLYCEMIA, WITHOUT LONG-TERM CURRENT USE OF INSULIN (H): ICD-10-CM

## 2024-07-08 RX ORDER — TIRZEPATIDE 5 MG/.5ML
5 INJECTION, SOLUTION SUBCUTANEOUS
Qty: 2 ML | Refills: 0 | Status: SHIPPED | OUTPATIENT
Start: 2024-07-08 | End: 2024-08-23

## 2024-08-17 ENCOUNTER — TELEPHONE (OUTPATIENT)
Dept: CARDIOLOGY | Facility: CLINIC | Age: 54
End: 2024-08-17
Payer: COMMERCIAL

## 2024-08-17 NOTE — TELEPHONE ENCOUNTER
Left Voicemail (1st Attempt) for the patient to call back and schedule the following:    Appointment type: rtn cardio  Provider: kenneth  Return date: 12/04/24  Specialty phone number: [phone] opt 1   Additional appointment(s) needed: labs  Additonal Notes: n/a

## 2024-08-20 NOTE — TELEPHONE ENCOUNTER
Left Voicemail (2 nd Attempt) for the patient to call back and schedule the following:     Appointment type: rtn cardio  Provider: kenneth  Return date: 12/04/24  Specialty phone number: [phone] opt 1   Additional appointment(s) needed: labs  Additonal Notes: n/a

## 2024-08-21 ENCOUNTER — VIRTUAL VISIT (OUTPATIENT)
Dept: FAMILY MEDICINE | Facility: CLINIC | Age: 54
End: 2024-08-21
Payer: COMMERCIAL

## 2024-08-21 DIAGNOSIS — I10 ESSENTIAL HYPERTENSION WITH GOAL BLOOD PRESSURE LESS THAN 140/90: ICD-10-CM

## 2024-08-21 DIAGNOSIS — M10.00 IDIOPATHIC GOUT, UNSPECIFIED CHRONICITY, UNSPECIFIED SITE: ICD-10-CM

## 2024-08-21 DIAGNOSIS — R00.1 SINUS BRADYCARDIA: ICD-10-CM

## 2024-08-21 DIAGNOSIS — E16.2 HYPOGLYCEMIA: Primary | ICD-10-CM

## 2024-08-21 PROCEDURE — 99214 OFFICE O/P EST MOD 30 MIN: CPT | Mod: 95 | Performed by: FAMILY MEDICINE

## 2024-08-21 RX ORDER — METOPROLOL SUCCINATE 50 MG/1
50 TABLET, EXTENDED RELEASE ORAL DAILY
COMMUNITY
Start: 2024-08-21

## 2024-08-21 RX ORDER — ALLOPURINOL 100 MG/1
100 TABLET ORAL DAILY
Qty: 90 TABLET | Refills: 3 | Status: SHIPPED | OUTPATIENT
Start: 2024-08-21

## 2024-08-21 ASSESSMENT — ASTHMA QUESTIONNAIRES
QUESTION_4 LAST FOUR WEEKS HOW OFTEN HAVE YOU USED YOUR RESCUE INHALER OR NEBULIZER MEDICATION (SUCH AS ALBUTEROL): NOT AT ALL
QUESTION_3 LAST FOUR WEEKS HOW OFTEN DID YOUR ASTHMA SYMPTOMS (WHEEZING, COUGHING, SHORTNESS OF BREATH, CHEST TIGHTNESS OR PAIN) WAKE YOU UP AT NIGHT OR EARLIER THAN USUAL IN THE MORNING: NOT AT ALL
ACT_TOTALSCORE: 25
QUESTION_2 LAST FOUR WEEKS HOW OFTEN HAVE YOU HAD SHORTNESS OF BREATH: NOT AT ALL
ACT_TOTALSCORE: 25
QUESTION_5 LAST FOUR WEEKS HOW WOULD YOU RATE YOUR ASTHMA CONTROL: COMPLETELY CONTROLLED
QUESTION_1 LAST FOUR WEEKS HOW MUCH OF THE TIME DID YOUR ASTHMA KEEP YOU FROM GETTING AS MUCH DONE AT WORK, SCHOOL OR AT HOME: NONE OF THE TIME

## 2024-08-21 ASSESSMENT — PATIENT HEALTH QUESTIONNAIRE - PHQ9
SUM OF ALL RESPONSES TO PHQ QUESTIONS 1-9: 0
10. IF YOU CHECKED OFF ANY PROBLEMS, HOW DIFFICULT HAVE THESE PROBLEMS MADE IT FOR YOU TO DO YOUR WORK, TAKE CARE OF THINGS AT HOME, OR GET ALONG WITH OTHER PEOPLE: NOT DIFFICULT AT ALL
SUM OF ALL RESPONSES TO PHQ QUESTIONS 1-9: 0

## 2024-08-21 NOTE — PROGRESS NOTES
Answers submitted by the patient for this visit:  Patient Health Questionnaire (Submitted on 8/21/2024)  If you checked off any problems, how difficult have these problems made it for you to do your work, take care of things at home, or get along with other people?: Not difficult at all  PHQ9 TOTAL SCORE: 0  Diabetes Visit (Submitted on 8/21/2024)  Chief Complaint: Chronic problems general questions HPI Form  Frequency of checking blood sugars:: continous glucose monitor  What time of day are you checking your blood sugars : before and after meals, at bedtime  Have you had any blood sugars above 200?: No  Have you had any blood sugars below 70?: Yes  Hypoglycemia symptoms:: none  Diabetic concerns:: low blood sugar, several less than 70 in the past few weeks  Paraesthesia present:: numbness in feet  Have you had a diabetic eye exam within the last year?: Yes  Date of last eye exam: : 5/9/24  Where was this eye exam done?: [facility]  General Questionnaire (Submitted on 8/21/2024)  Chief Complaint: Chronic problems general questions HPI Form  How many servings of fruits and vegetables do you eat daily?: 0-1  On average, how many sweetened beverages do you drink each day (Examples: soda, juice, sweet tea, etc.  Do NOT count diet or artificially sweetened beverages)?: 0  How many minutes a day do you exercise enough to make your heart beat faster?: 30 to 60  How many days a week do you exercise enough to make your heart beat faster?: 4  How many days per week do you miss taking your medication?: 0    Tremaine is a 53 year old who is being evaluated via a billable video visit.    How would you like to obtain your AVS? MyChart  If the video visit is dropped, the invitation should be resent by: Text to cell phone: [phone]  Will anyone else be joining your video visit? No      Assessment & Plan     Hypoglycemia  Overtreated at lower wt    Sinus bradycardia  Asymptomatic, on more beta-blocker than needed    Idiopathic  "gout, unspecified chronicity, unspecified site  overtreated  - Uric acid; Future  - allopurinol (ZYLOPRIM) 100 MG tablet; Take 1 tablet (100 mg) by mouth daily.    Essential hypertension with goal blood pressure less than 140/90  overtreated  - metoprolol succinate ER (TOPROL XL) 50 MG 24 hr tablet; Take 1 tablet (50 mg) by mouth daily.          BMI  Estimated body mass index is 30.68 kg/m  as calculated from the following:    Height as of 5/16/24: 1.803 m (5' 11\").    Weight as of 5/16/24: 99.8 kg (220 lb).   Weight management plan: diet and exercise, monjosselyn      Patient Instructions   Stop furosemide, change metformin to once daily, cut metoprolol  mg in half, cut allopurinol 300 mg in half. MyChart in 2-7 days followup     Yamilex Penaloza is a 53 year old, presenting for the following health issues:  No chief complaint on file.      Via the Health Maintenance questionnaire, the patient has reported the following services have been completed -Eye Exam: [facility] 2024-05-09, this information has been sent to the abstraction team.  HPI     Diabetes Follow-up    How often are you checking your blood sugar? Continuous glucose monitor  What time of day are you checking your blood sugars (select all that apply)?  Not applicable  Have you had any blood sugars above 200?  No  Have you had any blood sugars below 70?  Yes at night  What symptoms do you notice when your blood sugar is low?  Shaky, Dizzy, and Weak  What concerns do you have today about your diabetes? Low blood sugar   Do you have any of these symptoms? (Select all that apply)  Weight loss and No numbness or tingling in feet.  No redness, sores or blisters on feet.  No complaints of excessive thirst.  No reports of blurry vision.  No significant changes to weight.          Hyperlipidemia Follow-Up    Are you regularly taking any medication or supplement to lower your cholesterol?   Yes- Crestor  Are you having muscle aches or other side effects " that you think could be caused by your cholesterol lowering medication?  No    Hypertension Follow-up    Do you check your blood pressure regularly outside of the clinic? Yes   Are you following a low salt diet? Yes  Are your blood pressures ever more than 140 on the top number (systolic) OR more   than 90 on the bottom number (diastolic), for example 140/90? No    BP Readings from Last 2 Encounters:   05/16/24 130/79   01/26/24 131/77     Hemoglobin A1C (%)   Date Value   06/22/2024 5.5   03/15/2024 6.4 (H)     LDL Cholesterol Calculated (mg/dL)   Date Value   01/26/2024 103 (H)   04/18/2023 90   11/09/2020 114 (H)   10/09/2019 91     How many days per week do you miss taking your medication? 0      Review of Systems  Constitutional, HEENT, cardiovascular, pulmonary, gi and gu systems are negative, except as otherwise noted.      Objective           Vitals:  No vitals were obtained today due to virtual visit.    Physical Exam   GENERAL: alert and no distress  EYES: Eyes grossly normal to inspection.  No discharge or erythema, or obvious scleral/conjunctival abnormalities.  RESP: No audible wheeze, cough, or visible cyanosis.    SKIN: Visible skin clear. No significant rash, abnormal pigmentation or lesions.  NEURO: Cranial nerves grossly intact.  Mentation and speech appropriate for age.  PSYCH: Appropriate affect, tone, and pace of words        Video-Visit Details    Type of service:  Video Visit   Originating Location (pt. Location): Home    Distant Location (provider location):  On-site  Platform used for Video Visit: Mad[employer]  Signed Electronically by: Samy Marcelino MD

## 2024-08-21 NOTE — PATIENT INSTRUCTIONS
Stop furosemide, change metformin to once daily, cut metoprolol  mg in half, cut allopurinol 300 mg in half. MyChart in 2-7 days followup

## 2024-08-22 DIAGNOSIS — E11.65 TYPE 2 DIABETES MELLITUS WITH HYPERGLYCEMIA, WITHOUT LONG-TERM CURRENT USE OF INSULIN (H): ICD-10-CM

## 2024-08-23 ENCOUNTER — MYC MEDICAL ADVICE (OUTPATIENT)
Dept: FAMILY MEDICINE | Facility: CLINIC | Age: 54
End: 2024-08-23
Payer: COMMERCIAL

## 2024-08-23 RX ORDER — TIRZEPATIDE 5 MG/.5ML
5 INJECTION, SOLUTION SUBCUTANEOUS
Qty: 2 ML | Refills: 12 | Status: SHIPPED | OUTPATIENT
Start: 2024-08-23

## 2024-09-01 DIAGNOSIS — I48.11 LONGSTANDING PERSISTENT ATRIAL FIBRILLATION (H): ICD-10-CM

## 2024-09-05 RX ORDER — DILTIAZEM HYDROCHLORIDE 180 MG/1
180 CAPSULE, EXTENDED RELEASE ORAL DAILY
Qty: 90 CAPSULE | Refills: 0 | Status: SHIPPED | OUTPATIENT
Start: 2024-09-05

## 2024-09-24 ENCOUNTER — DOCUMENTATION ONLY (OUTPATIENT)
Dept: ANTICOAGULATION | Facility: CLINIC | Age: 54
End: 2024-09-24
Payer: COMMERCIAL

## 2024-09-24 NOTE — PROGRESS NOTES
ANTICOAGULATION DIRECT ORAL ANTICOAGULANT MONITORING    SUBJECTIVE     The [facility] is evaluating Tremaine King's Apixaban (Eliquis) as part of its Anticoagulation Monitoring Program.    Indication:Atrial Fibrillation  Current dose per medication list: Apixaban 2.5 mg BID  Recent hospitalizations/ED/Office Visits for bleeding/clotting concerns: No  Other bleeding or side effect concerns: No  Additional findings: Half dose apixaban was started due to 11/2021 GI bleed and concurrent diltiazem use    OBJECTIVE     Age: 54 year old    Wt Readings from Last 2 Encounters:   05/16/24 99.8 kg (220 lb)   01/26/24 112 kg (247 lb)      Lab Results   Component Value Date    CR 0.95 11/27/2023    CR 1.16 04/18/2023    CR 1.08 12/09/2022     Creatinine Clearance (using actual bodyweight, mL/min):     Lab Results   Component Value Date    HGB 15.8 11/27/2023    HGB 14.8 11/09/2020     11/27/2023     11/09/2020     ASSESSMENT/PLAN     A chart review for Direct Oral Anticoagulant (DOAC) Stewardship has been completed for:     Dosing: dose appropriate for indication    Plan made per [facility] anticoagulation protocol    Jan Duong RN  Anticoagulation Clinic

## 2024-09-30 ENCOUNTER — LAB (OUTPATIENT)
Dept: LAB | Facility: CLINIC | Age: 54
End: 2024-09-30
Payer: COMMERCIAL

## 2024-09-30 DIAGNOSIS — E11.9 TYPE 2 DIABETES MELLITUS WITHOUT COMPLICATION, WITH LONG-TERM CURRENT USE OF INSULIN (H): Primary | ICD-10-CM

## 2024-09-30 DIAGNOSIS — Z79.4 TYPE 2 DIABETES MELLITUS WITHOUT COMPLICATION, WITH LONG-TERM CURRENT USE OF INSULIN (H): Primary | ICD-10-CM

## 2024-09-30 DIAGNOSIS — I48.91 ATRIAL FIBRILLATION, UNSPECIFIED TYPE (H): ICD-10-CM

## 2024-09-30 DIAGNOSIS — M10.00 IDIOPATHIC GOUT, UNSPECIFIED CHRONICITY, UNSPECIFIED SITE: ICD-10-CM

## 2024-09-30 LAB
EST. AVERAGE GLUCOSE BLD GHB EST-MCNC: 108 MG/DL
HBA1C MFR BLD: 5.4 % (ref 0–5.6)

## 2024-09-30 PROCEDURE — 80048 BASIC METABOLIC PNL TOTAL CA: CPT

## 2024-09-30 PROCEDURE — 83036 HEMOGLOBIN GLYCOSYLATED A1C: CPT

## 2024-09-30 PROCEDURE — 36415 COLL VENOUS BLD VENIPUNCTURE: CPT

## 2024-09-30 PROCEDURE — 84550 ASSAY OF BLOOD/URIC ACID: CPT

## 2024-10-01 ENCOUNTER — VIRTUAL VISIT (OUTPATIENT)
Dept: PHARMACY | Facility: CLINIC | Age: 54
End: 2024-10-01
Payer: COMMERCIAL

## 2024-10-01 DIAGNOSIS — I48.91 ATRIAL FIBRILLATION, UNSPECIFIED TYPE (H): Primary | ICD-10-CM

## 2024-10-01 DIAGNOSIS — M10.00 IDIOPATHIC GOUT, UNSPECIFIED CHRONICITY, UNSPECIFIED SITE: ICD-10-CM

## 2024-10-01 DIAGNOSIS — E11.65 TYPE 2 DIABETES MELLITUS WITH HYPERGLYCEMIA, WITHOUT LONG-TERM CURRENT USE OF INSULIN (H): ICD-10-CM

## 2024-10-01 DIAGNOSIS — I10 ESSENTIAL HYPERTENSION WITH GOAL BLOOD PRESSURE LESS THAN 140/90: ICD-10-CM

## 2024-10-01 LAB
ANION GAP SERPL CALCULATED.3IONS-SCNC: 17 MMOL/L (ref 7–15)
BUN SERPL-MCNC: 17.7 MG/DL (ref 6–20)
CALCIUM SERPL-MCNC: 10.4 MG/DL (ref 8.8–10.4)
CHLORIDE SERPL-SCNC: 98 MMOL/L (ref 98–107)
CREAT SERPL-MCNC: 1.43 MG/DL (ref 0.67–1.17)
EGFRCR SERPLBLD CKD-EPI 2021: 58 ML/MIN/1.73M2
GLUCOSE SERPL-MCNC: 103 MG/DL (ref 70–99)
HCO3 SERPL-SCNC: 25 MMOL/L (ref 22–29)
POTASSIUM SERPL-SCNC: 3.9 MMOL/L (ref 3.4–5.3)
SODIUM SERPL-SCNC: 140 MMOL/L (ref 135–145)
URATE SERPL-MCNC: 6.9 MG/DL (ref 3.4–7)

## 2024-10-01 PROCEDURE — 99207 PR NO CHARGE LOS: CPT | Mod: 93 | Performed by: PHARMACIST

## 2024-10-01 NOTE — PROGRESS NOTES
Medication Therapy Management (MTM) Encounter    ASSESSMENT:                            Medication Adherence/Access: No issues identified    Diabetes  Patient is meeting A1c goal of < 7%.   Option to continue dose titration of Mounjaro, patient prefers to keep current dose and work on exercise to lose additional weight.   OK to reduce use of CGM if desired.   Patient is not taking statin, unclear reason.  Consider further discussion with PCP about starting statin.     A fib/hypertension:   Blood pressure is well controlled.  Annual BMP check due.  Reviewed drug formulary, appears Eliquis and Xarelto are tier 3 but dabigatran is tier 2 - consider switching to dabigatran, prefer DOAC to warfarin if he is able to afford.  Given history of GI bleed, could consider lower dose of 110mg twice daily off label but as there are no clinically significant drug interactions and he is no longer using alcohol, would consider full dose of 150mg twice daily. However appears in review of the drug formulary that the 110mg dose may be less expensive/lower tier. Patient will review options, awaiting him to reach out for any medication changes.    Gout  Uric acid is not at goal <6.   Consider increasing dose of allopurinol to 200mg/day as needed to reach uric acid goal.  Guidelines do not have consensus on discontinuation of prophylactic treatment but he is controlling diabetes, weight, and alcohol intake well - would be reasonable to trial off medication if he desires.    PLAN:                            Patient looking into alternative options to Eliquis as discussed, no prescription changes at visit today.     Recheck BMP    Consider increasing allopurinol to 200mg daily - routing to PCP    Follow-up: 6 months    SUBJECTIVE/OBJECTIVE:                          Tremaine King is a 54 year old male seen for a follow-up visit.       Reason for visit: medication recheck, discuss possibility of switching Eliquis to warfarin for cost  savings.    Allergies/ADRs: Reviewed in chart  Past Medical History: Reviewed in chart  Tobacco: He reports that he quit smoking about 35 years ago. His smoking use included cigars. He has never used smokeless tobacco.  Alcohol: not currently using    Medication Adherence/Access:   Switched insurance, cost of Eliquis has been very high since switching plans. Thinks he has a deductible but other high cost medicines haven't been as costly as the Eliquis.     Diabetes   Mounjaro 5mg every 7 days  Metformin 500mg twice a day    Patient is not experiencing side effects.    Current diabetes symptoms: none  Diet/Exercise: noticed reduction in portion sizes, cravings with Mounjaro.  Gym 3-4 days per week  Weight loss plateau but 35 lb total weight loss.  Working on changing exercise goals to lose another 10 lb.       Blood sugar monitoring: Continuous Glucose Monitor see AGP below      Eye exam is up to date  Foot exam: due    Afib/Hypertension:  Eliquis 2.5mg twice daily for stroke prevention - high cost >$700/month, wonders about switching to warfarin  Metoprolol succinate 50mg daily   Diltiazem XR 180mg daily  Furosemide 20mg daily  Patient reports no current medication side effects.    Patient does have a history of GI bleed.   Patient self-monitors blood pressure.  Home BP monitoring 116-125/70s. Heart rate 40-60s. Feeling better since reducing dose of metoprolol.   [id number] Score    Date Calculated: 8/24/2024  3:12 PM  [id number] Score: 2        Gout  allopurinol 100mg daily  Patient reports no current pain concerns. Last gout flare 10 years ago.   Patient is experiencing the following medication side effects: none.      Today's Vitals: There were no vitals taken for this visit.  ----------------      I spent 20 minutes with this patient today. All changes were made via collaborative practice agreement with Samy Marcelino MD. A copy of the visit note was provided to the patient's provider(s).    A summary  of these recommendations was sent via [employer].    Adelaide Watkins, PharmD, BCACP   Medication Therapy Management Pharmacist   [facility]  [phone]       Telemedicine Visit Details  The patient's medications can be safely assessed via a telemedicine encounter.  Type of service:  Telephone visit  Originating Location (pt. Location): Home    Distant Location (provider location):  On-site  Start Time: 10:38 AM  End Time: 10:58 AM     Medication Therapy Recommendations  Idiopathic gout, unspecified chronicity, unspecified site    Current Medication: allopurinol (ZYLOPRIM) 100 MG tablet   Rationale: Dose too low - Dosage too low - Effectiveness   Recommendation: Increase Dose   Status: Contact Provider - Awaiting Response

## 2024-10-01 NOTE — PATIENT INSTRUCTIONS
"Recommendations from today's MTM visit:                                                         Congrats on the excellent control of diabetes and blood pressure!  Glad to see all your efforts are helping control weight and improve your health!    We talked about lower cost alternatives to Eliquis. Here are some suggestions - please let us know if you'd like a medication switch:  Look for coupons for Eliquis (check out needymeds.com)  See if your plan has any better price for Xarelto, a similar medicine.  They actually may have quite a better price for Pradaxa (generic dabigatran), another alternative.  Switch to warfarin - not preferred unless we need to for cost savings.  Warfarin has a slightly higher risk of bleeding and slightly less effective for stroke prevention as well.     Consider increasing allopurinol dose until uric acid levels are back under 6 - I sent a message to Dr Marcelino about this.    Recheck kidney and electrolytes, trying to add this on to your labs from yesterday.     Follow-up: 6 months or sooner as needed    It was great speaking with you today.  I value your experience and would be very thankful for your time in providing feedback in our clinic survey. In the next few days, you may receive an email or text message from [employer] with a link to a survey related to your  clinical pharmacist.\"     To schedule another MTM appointment, please call the clinic directly or you may call the MTM scheduling line at [phone] or toll-free at [phone].     My Clinical Pharmacist's contact information:                                                      Please feel free to contact me with any questions or concerns you have.      Adelaide Watkins, PharmD, BCACP   Medication Therapy Management Pharmacist   [facility]  [phone]    "

## 2024-10-01 NOTE — CONFIDENTIAL NOTE
Called lab and they stated it is in process.    Thanks!  Chu HAMMER RN   [facility]

## 2024-10-01 NOTE — Clinical Note
Could you please call the lab in [place] and tell them I want to add a BMP to the blood he had drawn yesterday?  I signed the order already.

## 2024-10-01 NOTE — Clinical Note
Uric acid level came back above 6, guidelines recommend titrating dose to target goal FYI. Tremaine would like to hear from you if you want him to adjust the dose.

## 2024-10-02 ENCOUNTER — DOCUMENTATION ONLY (OUTPATIENT)
Dept: LAB | Facility: CLINIC | Age: 54
End: 2024-10-02
Payer: COMMERCIAL

## 2024-10-02 DIAGNOSIS — M10.00 IDIOPATHIC GOUT, UNSPECIFIED CHRONICITY, UNSPECIFIED SITE: Primary | ICD-10-CM

## 2024-10-02 NOTE — PROGRESS NOTES
Patient has an upcomming lab visit without orders. Please place orders as needed.    Patient requesting: Basic    Appointment date: 10/10/2024

## 2024-10-16 ENCOUNTER — LAB (OUTPATIENT)
Dept: LAB | Facility: CLINIC | Age: 54
End: 2024-10-16
Payer: COMMERCIAL

## 2024-10-16 DIAGNOSIS — M10.00 IDIOPATHIC GOUT, UNSPECIFIED CHRONICITY, UNSPECIFIED SITE: ICD-10-CM

## 2024-10-16 LAB
ANION GAP SERPL CALCULATED.3IONS-SCNC: 13 MMOL/L (ref 7–15)
BUN SERPL-MCNC: 13.9 MG/DL (ref 6–20)
CALCIUM SERPL-MCNC: 9.9 MG/DL (ref 8.8–10.4)
CHLORIDE SERPL-SCNC: 102 MMOL/L (ref 98–107)
CREAT SERPL-MCNC: 1.4 MG/DL (ref 0.67–1.17)
EGFRCR SERPLBLD CKD-EPI 2021: 59 ML/MIN/1.73M2
ERYTHROCYTE [DISTWIDTH] IN BLOOD BY AUTOMATED COUNT: 12.2 % (ref 10–15)
GLUCOSE SERPL-MCNC: 99 MG/DL (ref 70–99)
HCO3 SERPL-SCNC: 25 MMOL/L (ref 22–29)
HCT VFR BLD AUTO: 41.2 % (ref 40–53)
HGB BLD-MCNC: 14.2 G/DL (ref 13.3–17.7)
MCH RBC QN AUTO: 29.6 PG (ref 26.5–33)
MCHC RBC AUTO-ENTMCNC: 34.5 G/DL (ref 31.5–36.5)
MCV RBC AUTO: 86 FL (ref 78–100)
PLATELET # BLD AUTO: 165 10E3/UL (ref 150–450)
POTASSIUM SERPL-SCNC: 4 MMOL/L (ref 3.4–5.3)
RBC # BLD AUTO: 4.8 10E6/UL (ref 4.4–5.9)
SODIUM SERPL-SCNC: 140 MMOL/L (ref 135–145)
WBC # BLD AUTO: 9.6 10E3/UL (ref 4–11)

## 2024-10-16 PROCEDURE — 36415 COLL VENOUS BLD VENIPUNCTURE: CPT

## 2024-10-16 PROCEDURE — 80048 BASIC METABOLIC PNL TOTAL CA: CPT

## 2024-10-16 PROCEDURE — 85027 COMPLETE CBC AUTOMATED: CPT

## 2024-10-16 RX ORDER — ALLOPURINOL 100 MG/1
200 TABLET ORAL DAILY
Qty: 180 TABLET | Refills: 3 | Status: SHIPPED | OUTPATIENT
Start: 2024-10-16

## 2024-10-17 DIAGNOSIS — R74.8 ELEVATED CREATINE KINASE: Primary | ICD-10-CM

## 2024-10-17 NOTE — RESULT ENCOUNTER NOTE
Shree Penaloza: Your recent results show mildly decreased kidney function. There was a similar finding 3-4 years ago that responded to stopping lisinopril and lasix. So long as blood pressure numbers are around 130/80 or less, I don't think you need to see nephrology (kidney imaging looked good then) again, but recommend retesting in 4-6 months with blood and urine for kidney function. Please contact if questions.     Samy KNOWLES

## 2024-10-31 DIAGNOSIS — E11.9 TYPE 2 DIABETES MELLITUS WITHOUT COMPLICATION, WITHOUT LONG-TERM CURRENT USE OF INSULIN (H): Primary | ICD-10-CM

## 2024-10-31 DIAGNOSIS — E11.9 TYPE 2 DIABETES MELLITUS WITHOUT COMPLICATION, WITHOUT LONG-TERM CURRENT USE OF INSULIN (H): ICD-10-CM

## 2024-10-31 RX ORDER — ACYCLOVIR 800 MG/1
1 TABLET ORAL
Qty: 6 EACH | Refills: 3 | Status: SHIPPED | OUTPATIENT
Start: 2024-10-31 | End: 2024-10-31

## 2024-10-31 RX ORDER — HYDROCHLOROTHIAZIDE 12.5 MG/1
CAPSULE ORAL
Qty: 6 EACH | Refills: 1 | Status: SHIPPED | OUTPATIENT
Start: 2024-10-31 | End: 2024-11-07

## 2024-11-18 ENCOUNTER — TELEPHONE (OUTPATIENT)
Dept: CARDIOLOGY | Facility: CLINIC | Age: 54
End: 2024-11-18
Payer: COMMERCIAL

## 2024-11-18 NOTE — TELEPHONE ENCOUNTER
Patient Contacted for the patient to call back and schedule the following:    Appointment type: RTN CARDIO  Provider: WAYNE  Return date: 1/13/2025  Specialty phone number: [phone] OPT 1  Additional appointment(s) needed: LABS PRIOR  Additonal Notes: N/A

## 2024-12-10 ENCOUNTER — MYC REFILL (OUTPATIENT)
Dept: FAMILY MEDICINE | Facility: CLINIC | Age: 54
End: 2024-12-10
Payer: COMMERCIAL

## 2024-12-10 DIAGNOSIS — J45.30 MILD PERSISTENT ASTHMA WITHOUT COMPLICATION: ICD-10-CM

## 2024-12-10 RX ORDER — BUDESONIDE AND FORMOTEROL FUMARATE DIHYDRATE 80; 4.5 UG/1; UG/1
2 AEROSOL RESPIRATORY (INHALATION) 2 TIMES DAILY
Qty: 6.9 G | Refills: 1 | Status: SHIPPED | OUTPATIENT
Start: 2024-12-10

## 2025-01-06 ENCOUNTER — LAB (OUTPATIENT)
Dept: LAB | Facility: CLINIC | Age: 55
End: 2025-01-06
Payer: COMMERCIAL

## 2025-01-06 DIAGNOSIS — R74.8 ELEVATED CREATINE KINASE: ICD-10-CM

## 2025-01-06 LAB
ANION GAP SERPL CALCULATED.3IONS-SCNC: 11 MMOL/L (ref 7–15)
BUN SERPL-MCNC: 15.1 MG/DL (ref 6–20)
CALCIUM SERPL-MCNC: 9.9 MG/DL (ref 8.8–10.4)
CHLORIDE SERPL-SCNC: 100 MMOL/L (ref 98–107)
CREAT SERPL-MCNC: 1.35 MG/DL (ref 0.67–1.17)
CREAT UR-MCNC: 29.3 MG/DL
EGFRCR SERPLBLD CKD-EPI 2021: 62 ML/MIN/1.73M2
GLUCOSE SERPL-MCNC: 85 MG/DL (ref 70–99)
HCO3 SERPL-SCNC: 29 MMOL/L (ref 22–29)
MICROALBUMIN UR-MCNC: <12 MG/L
MICROALBUMIN/CREAT UR: NORMAL MG/G{CREAT}
POTASSIUM SERPL-SCNC: 3.7 MMOL/L (ref 3.4–5.3)
SODIUM SERPL-SCNC: 140 MMOL/L (ref 135–145)

## 2025-01-06 PROCEDURE — 80048 BASIC METABOLIC PNL TOTAL CA: CPT | Performed by: PATHOLOGY

## 2025-01-06 PROCEDURE — 82043 UR ALBUMIN QUANTITATIVE: CPT | Performed by: FAMILY MEDICINE

## 2025-01-06 PROCEDURE — 99000 SPECIMEN HANDLING OFFICE-LAB: CPT | Performed by: PATHOLOGY

## 2025-01-06 PROCEDURE — 36415 COLL VENOUS BLD VENIPUNCTURE: CPT | Performed by: PATHOLOGY

## 2025-01-10 ENCOUNTER — LAB (OUTPATIENT)
Dept: LAB | Facility: CLINIC | Age: 55
End: 2025-01-10
Payer: COMMERCIAL

## 2025-01-10 DIAGNOSIS — E11.9 TYPE 2 DIABETES MELLITUS WITHOUT COMPLICATION, WITH LONG-TERM CURRENT USE OF INSULIN (H): Primary | ICD-10-CM

## 2025-01-10 DIAGNOSIS — Z79.4 TYPE 2 DIABETES MELLITUS WITHOUT COMPLICATION, WITH LONG-TERM CURRENT USE OF INSULIN (H): Primary | ICD-10-CM

## 2025-01-13 ENCOUNTER — VIRTUAL VISIT (OUTPATIENT)
Dept: CARDIOLOGY | Facility: CLINIC | Age: 55
End: 2025-01-13
Attending: INTERNAL MEDICINE
Payer: COMMERCIAL

## 2025-01-13 VITALS
DIASTOLIC BLOOD PRESSURE: 68 MMHG | SYSTOLIC BLOOD PRESSURE: 116 MMHG | WEIGHT: 195 LBS | HEIGHT: 71 IN | HEART RATE: 74 BPM | BODY MASS INDEX: 27.3 KG/M2

## 2025-01-13 DIAGNOSIS — E78.5 DYSLIPIDEMIA: ICD-10-CM

## 2025-01-13 DIAGNOSIS — Z98.890 S/P MVR (MITRAL VALVE REPAIR): Primary | ICD-10-CM

## 2025-01-13 DIAGNOSIS — I10 ESSENTIAL HYPERTENSION WITH GOAL BLOOD PRESSURE LESS THAN 140/90: ICD-10-CM

## 2025-01-13 ASSESSMENT — PAIN SCALES - GENERAL: PAINLEVEL_OUTOF10: NO PAIN (0)

## 2025-01-13 NOTE — PATIENT INSTRUCTIONS
Plan:    Continue current medications. Added Eliquis 2.5 mg bid to med list.   Follow up with me 1 year      Your Care Team:         Cardiology   Telephone Number     Dakota Hdz RN [phone]    After business hours: [phone], ask for cardiologist on-call               Cardiovascular Clinic:   [address] [place] [address]. [place] [address]      As always, thank you for trusting us with your health care needs!

## 2025-01-13 NOTE — NURSING NOTE
Current patient location: [address] [facility] [address]    Is the patient currently in the state of [place]? YES    Visit mode: VIDEO    If the visit is dropped, the patient can be reconnected by:VIDEO VISIT: Text to cell phone:   Telephone Information:   Mobile [phone]       Will anyone else be joining the visit? NO  (If patient encounters technical issues they should call [phone]    Are changes needed to the allergy or medication list? Pt stated no med changes    Are refills needed on medications prescribed by this physician? NO    Rooming Documentation:  Not applicable    Reason for visit: RECHECK    No other vitals to report per pt    Flor GOMESF

## 2025-01-13 NOTE — PROGRESS NOTES
I had the pleasure of participating in coordination of clinical cardiovascular care for patient, Tremaine King, via [employer]'s virtual visit protocol.    History:    Tremaine King is a very pleasant 54 year old man with persistent atrial fibrillation, ASD and mitral valve repair (for possible cleft) as a child (age 3), repeat mitral valve repair in his 30s with an annuloplasty ring, diabetes, hypertension and hyperlipidemia.     He had a GI bleed with a hemoglobin of 6.6 in Nov /2021.  EGD showed a 10 mm deep gastric ulcer that was cauterized and injected.  The ulcer was thought secondary to use of ibuprofen 600 mg a day along with alcohol use.     He has been followed for persistent valvular atrial fibrillation.  His AGC0EN4-[id number] score is 2 for hypertension and diabetes. He started tirzepatide for diabetes in Jan 2024     Since the last visit, he has continued to do well.  He continues his active lifestyle going to gym. He is without symptoms of chest pain or shortness of breath or other limitation. He is tolerating Eliquis without bleeding.        Current Outpatient Medications   Medication Sig Dispense Refill    albuterol (PROAIR HFA/PROVENTIL HFA/VENTOLIN HFA) 108 (90 Base) MCG/ACT inhaler Inhale 2 puffs into the lungs every 6 hours as needed for shortness of breath / dyspnea 8.5 g 0    allopurinol (ZYLOPRIM) 100 MG tablet Take 2 tablets (200 mg) by mouth daily. 180 tablet 3    amoxicillin (AMOXIL) 500 MG capsule Please take a total of 2 grams (four 500 mg capsules) 30-60 minutes prior to dental procedure. 12 capsule 3    budesonide-formoterol (SYMBICORT) 80-4.5 MCG/ACT Inhaler Inhale 2 puffs into the lungs 2 times daily. 6.9 g 1    Continuous Glucose Sensor (FREESTYLE MINGO 3 PLUS SENSOR) MISC Change every 15 days. 6 each 1    diltiazem ER (DILT-XR) 180 MG 24 hr capsule Take 1 capsule (180 mg) by mouth daily. Please keep appt 1/13/25 90 capsule 0    metFORMIN (GLUCOPHAGE) 500 MG tablet Take 1 tablet (500 mg)  by mouth 2 times daily (with meals) 180 tablet 3    metoprolol succinate ER (TOPROL XL) 50 MG 24 hr tablet Take 1 tablet (50 mg) by mouth daily.      Multiple Vitamins-Minerals (MENS 50+ MULTI VITAMIN/MIN PO) Take 1 tablet by mouth daily      order for DME Equipment being ordered: CPAP  Patient Tremaine King was set up at [facility] on September 14, 2015. Patient received a [employer] DreamStation Auto CPAP Auto. Pressures were set at Auto 12 - 12 cm H2O.   Patient s ramp is 9 cm H2O for Auto and FLEX/EPR is A Flex.  Patient received a [employer] Mask name: BLUE GEL NASAL  Nasal mask Size Medium, heated tubing and heated humidifier.  Patient is not enrolled in the STM Program and does not need to meet compliance. Samina Arora      sildenafil (REVATIO) 20 MG tablet Take 1 tablet (20 mg) by mouth daily as needed (erectile dysfunction) 12 tablet 1    tirzepatide (MOUNJARO) 5 MG/0.5ML pen Inject 5 mg Subcutaneous every 7 days 2 mL 12   Eliquis 2.5 mg bid    Allergies - reviewed     Allergies   Allergen Reactions    Hctz [Hydrochlorothiazide]      Hypokalemia      Spironolactone      hyponatremia       Past history -reviewed  Past Medical History:   Diagnosis Date    A-fib (H)     ASD (atrial septal defect)     s/p repair    Gastric ulcer     Hypertension     Lumbar disc herniation 05/2018    better after steroid inj and PT    Spinal headache     cpap    Uncomplicated asthma         Social history - reviewed  Former smoker  Quit alcohol in 2001    FAMILY HISTORY:  No premature coronary artery disease, aortic dissection or sudden cardiac death.      ROS: non contributory on the 10-point review of system    Exam: limited due to the nature of this visit     116/68 mmHg, 62 HR  In general, the patient is in no acute distress.    Breathing is unlabored.   Neck: No jugular venous distension.    No edema per patient      Data:    I have independently reviewed this patient's relevant laboratory and cardiac  data :    Recent Labs   Lab Test 01/10/25  0923 01/26/24  1608   CHOL 139 171   HDL 59 38*   LDL 70 103*   TRIG 48 149      Recent Labs   Lab Test 02/22/22  0852 11/19/21  1500   AST 35 90*     Recent Labs   Lab Test 02/22/22  0852 11/19/21  1500   ALT 41 101*     Recent Labs   Lab Test 01/06/25  1553 10/16/24  1311    140   POTASSIUM 3.7 4.0   CHLORIDE 100 102   CO2 29 25   ANIONGAP 11 13   BUN 15.1 13.9   CR 1.35* 1.40*     Recent Labs   Lab Test 10/16/24  1311 11/27/23  0807   WBC 9.6 9.0   RBC 4.80 5.30   HGB 14.2 15.8   MCV 86 83    210     Recent Labs   Lab Test 01/10/25  0923 09/30/24  1257   A1C 5.4 5.4     Recent Labs   Lab Test 01/26/24  1608 04/18/23  0930   TSH 1.69 1.51       Zio patch monitor 10/3/2022 [id number]% burden of AFib, average heart rate 58 beats per minute, pauses during sleeping hours, nonsustained VT.  No other arrhythmias.    Echocardiogram 11/13/23 reveals an ejection fraction of 55-60% mean gradient across the mitral valve repair of 3 mmHg, trace mitral insufficiency.    Assessment and Plan:  54 year old man with    1.  Persistent atrial fibrillation    Patient has been in A-fib since November 2021.  [id number]-PEHo 2 score is currently 2 for diabetes and hypertension.  Due to his prior history of severe life-threatening GI bleed he chooses to pursue half dose anticoagulation (also due to the interaction with the diltiazem, the actual anticoagulant effect should be slightly higher than usual)    He will also continue to abstain from alcohol and NSAID use as well.    2.  History of mitral valve repair    Last transthoracic echo showed no mitral stenosis and trace regurgitation. Biventricular function is preserved.     3.  Hypertension well controlled:    Blood pressures currently well controlled    4.  Hyperlipidemia    Lipids currently in goal range    Recommendations:     Continue current medications. Add Eliquis 2.5 mg bid to his med list.   Follow up with me 1  year      Video Visit Details:     Type of service:  Video Visit  Video Start Time: 8.04 am  Video End Time: 8.18 am  Patient location: Home  Provider location:  Off-site   Platform used for Video Visit: [facility]   In addition to visit time documented above, I spent an additional 15 minutes on data review and documentation.      Valentina James MD, MS  Professor of Medicine  Cardiovascular Medicine

## 2025-01-13 NOTE — LETTER
1/13/2025      RE: Tremaine King  [address] [place]  [facility] [id number]       Dear Colleague,    Thank you for the opportunity to participate in the care of your patient, Tremaine King, at the [facility] [place] [facility]. Please see a copy of my visit note below.      I had the pleasure of participating in coordination of clinical cardiovascular care for patient, Tremaine King, via [facility]'s virtual visit protocol.    History:    Tremiane King is a very pleasant 54 year old man with persistent atrial fibrillation, ASD and mitral valve repair (for possible cleft) as a child (age 3), repeat mitral valve repair in his 30s with an annuloplasty ring, diabetes, hypertension and hyperlipidemia.     He had a GI bleed with a hemoglobin of 6.6 in Nov /2021.  EGD showed a 10 mm deep gastric ulcer that was cauterized and injected.  The ulcer was thought secondary to use of ibuprofen 600 mg a day along with alcohol use.     He has been followed for persistent valvular atrial fibrillation.  His [id number] score is 2 for hypertension and diabetes. He started tirzepatide for diabetes in Jan 2024     Since the last visit, he has continued to do well.  He continues his active lifestyle going to gym. He is without symptoms of chest pain or shortness of breath or other limitation. He is tolerating Eliquis without bleeding.        Current Outpatient Medications   Medication Sig Dispense Refill     albuterol (PROAIR HFA/PROVENTIL HFA/VENTOLIN HFA) 108 (90 Base) MCG/ACT inhaler Inhale 2 puffs into the lungs every 6 hours as needed for shortness of breath / dyspnea 8.5 g 0     allopurinol (ZYLOPRIM) 100 MG tablet Take 2 tablets (200 mg) by mouth daily. 180 tablet 3     amoxicillin (AMOXIL) 500 MG capsule Please take a total of 2 grams (four 500 mg capsules) 30-60 minutes prior to dental procedure. 12 capsule 3     budesonide-formoterol (SYMBICORT) 80-4.5 MCG/ACT  Inhaler Inhale 2 puffs into the lungs 2 times daily. 6.9 g 1     Continuous Glucose Sensor (FREESTYLE MINGO 3 PLUS SENSOR) MISC Change every 15 days. 6 each 1     diltiazem ER (DILT-XR) 180 MG 24 hr capsule Take 1 capsule (180 mg) by mouth daily. Please keep appt 1/13/25 90 capsule 0     metFORMIN (GLUCOPHAGE) 500 MG tablet Take 1 tablet (500 mg) by mouth 2 times daily (with meals) 180 tablet 3     metoprolol succinate ER (TOPROL XL) 50 MG 24 hr tablet Take 1 tablet (50 mg) by mouth daily.       Multiple Vitamins-Minerals (MENS 50+ MULTI VITAMIN/MIN PO) Take 1 tablet by mouth daily       order for DME Equipment being ordered: CPAP  Patient Tremaine King was set up at [place] on September 14, 2015. Patient received a OptiWi-fi Respironics DreamStation Auto CPAP Auto. Pressures were set at Auto 12 - 12 cm H2O.   Patient s ramp is 9 cm H2O for Auto and FLEX/EPR is A Flex.  Patient received a [employer]iah Respironics Mask name: BLUE GEL NASAL  Nasal mask Size Medium, heated tubing and heated humidifier.  Patient is not enrolled in the STM Program and does not need to meet compliance. Samina Arora       sildenafil (REVATIO) 20 MG tablet Take 1 tablet (20 mg) by mouth daily as needed (erectile dysfunction) 12 tablet 1     tirzepatide (MOUNJARO) 5 MG/0.5ML pen Inject 5 mg Subcutaneous every 7 days 2 mL 12   Eliquis 2.5 mg bid    Allergies - reviewed     Allergies   Allergen Reactions     Hctz [Hydrochlorothiazide]      Hypokalemia       Spironolactone      hyponatremia       Past history -reviewed  Past Medical History:   Diagnosis Date     A-fib (H)      ASD (atrial septal defect)     s/p repair     Gastric ulcer      Hypertension      Lumbar disc herniation 05/2018    better after steroid inj and PT     Spinal headache     cpap     Uncomplicated asthma         Social history - reviewed  Former smoker  Quit alcohol in 2001    FAMILY HISTORY:  No premature coronary artery disease, aortic dissection or sudden cardiac death.       ROS: non contributory on the 10-point review of system    Exam: limited due to the nature of this visit     116/68 mmHg, 62 HR  In general, the patient is in no acute distress.    Breathing is unlabored.   Neck: No jugular venous distension.    No edema per patient      Data:    I have independently reviewed this patient's relevant laboratory and cardiac data :    Recent Labs   Lab Test 01/10/25  0923 01/26/24  1608   CHOL 139 171   HDL 59 38*   LDL 70 103*   TRIG 48 149      Recent Labs   Lab Test 02/22/22  0852 11/19/21  1500   AST 35 90*     Recent Labs   Lab Test 02/22/22  0852 11/19/21  1500   ALT 41 101*     Recent Labs   Lab Test 01/06/25  1553 10/16/24  1311    140   POTASSIUM 3.7 4.0   CHLORIDE 100 102   CO2 29 25   ANIONGAP 11 13   BUN 15.1 13.9   CR 1.35* 1.40*     Recent Labs   Lab Test 10/16/24  1311 11/27/23  0807   WBC 9.6 9.0   RBC 4.80 5.30   HGB 14.2 15.8   MCV 86 83    210     Recent Labs   Lab Test 01/10/25  0923 09/30/24  1257   A1C 5.4 5.4     Recent Labs   Lab Test 01/26/24  1608 04/18/23  0930   TSH 1.69 1.51       Zio patch monitor 10/3/2022 [id number]% burden of AFib, average heart rate 58 beats per minute, pauses during sleeping hours, nonsustained VT.  No other arrhythmias.    Echocardiogram 11/13/23 reveals an ejection fraction of 55-60% mean gradient across the mitral valve repair of 3 mmHg, trace mitral insufficiency.    Assessment and Plan:  54 year old man with    1.  Persistent atrial fibrillation    Patient has been in A-fib since November 2021.  [id number] 2 score is currently 2 for diabetes and hypertension.  Due to his prior history of severe life-threatening GI bleed he chooses to pursue half dose anticoagulation (also due to the interaction with the diltiazem, the actual anticoagulant effect should be slightly higher than usual)    He will also continue to abstain from alcohol and NSAID use as well.    2.  History of mitral valve repair    Last transthoracic  echo showed no mitral stenosis and trace regurgitation. Biventricular function is preserved.     3.  Hypertension well controlled:    Blood pressures currently well controlled    4.  Hyperlipidemia    Lipids currently in goal range    Recommendations:     Continue current medications. Add Eliquis 2.5 mg bid to his med list.   Follow up with me 1 year      Video Visit Details:     Type of service:  Video Visit  Video Start Time: 8.04 am  Video End Time: 8.18 am  Patient location: Home  Provider location:  Off-site   Platform used for Video Visit: [employer]   In addition to visit time documented above, I spent an additional 15 minutes on data review and documentation.      Valentina James MD, MS  Professor of Medicine  Cardiovascular Medicine      Please do not hesitate to contact me if you have any questions/concerns.     Sincerely,     Valentina James MD

## 2025-01-18 DIAGNOSIS — R74.8 ELEVATED CREATINE KINASE: Primary | ICD-10-CM

## 2025-01-22 SDOH — HEALTH STABILITY: PHYSICAL HEALTH: ON AVERAGE, HOW MANY DAYS PER WEEK DO YOU ENGAGE IN MODERATE TO STRENUOUS EXERCISE (LIKE A BRISK WALK)?: 4 DAYS

## 2025-01-22 SDOH — HEALTH STABILITY: PHYSICAL HEALTH: ON AVERAGE, HOW MANY MINUTES DO YOU ENGAGE IN EXERCISE AT THIS LEVEL?: 60 MIN

## 2025-01-22 ASSESSMENT — SOCIAL DETERMINANTS OF HEALTH (SDOH): HOW OFTEN DO YOU GET TOGETHER WITH FRIENDS OR RELATIVES?: TWICE A WEEK

## 2025-01-27 ENCOUNTER — OFFICE VISIT (OUTPATIENT)
Dept: FAMILY MEDICINE | Facility: CLINIC | Age: 55
End: 2025-01-27
Payer: COMMERCIAL

## 2025-01-27 VITALS
HEART RATE: 65 BPM | HEIGHT: 70 IN | TEMPERATURE: 97.7 F | BODY MASS INDEX: 28.2 KG/M2 | SYSTOLIC BLOOD PRESSURE: 109 MMHG | OXYGEN SATURATION: 100 % | RESPIRATION RATE: 17 BRPM | WEIGHT: 197 LBS | DIASTOLIC BLOOD PRESSURE: 64 MMHG

## 2025-01-27 DIAGNOSIS — Z00.01 ENCOUNTER FOR PREVENTATIVE ADULT HEALTH CARE EXAM WITH ABNORMAL FINDINGS: Primary | ICD-10-CM

## 2025-01-27 DIAGNOSIS — Z23 NEED FOR HEPATITIS VACCINATION: ICD-10-CM

## 2025-01-27 DIAGNOSIS — J45.30 MILD PERSISTENT ASTHMA WITHOUT COMPLICATION: ICD-10-CM

## 2025-01-27 DIAGNOSIS — E11.9 TYPE 2 DIABETES MELLITUS WITHOUT COMPLICATION, WITHOUT LONG-TERM CURRENT USE OF INSULIN (H): ICD-10-CM

## 2025-01-27 DIAGNOSIS — I48.91 ATRIAL FIBRILLATION, UNSPECIFIED TYPE (H): ICD-10-CM

## 2025-01-27 DIAGNOSIS — E78.5 DYSLIPIDEMIA: ICD-10-CM

## 2025-01-27 DIAGNOSIS — I10 ESSENTIAL HYPERTENSION WITH GOAL BLOOD PRESSURE LESS THAN 140/90: ICD-10-CM

## 2025-01-27 PROBLEM — R60.0 LOCALIZED EDEMA: Status: RESOLVED | Noted: 2021-12-06 | Resolved: 2025-01-27

## 2025-01-27 PROCEDURE — G2211 COMPLEX E/M VISIT ADD ON: HCPCS | Performed by: FAMILY MEDICINE

## 2025-01-27 PROCEDURE — 99396 PREV VISIT EST AGE 40-64: CPT | Mod: 25 | Performed by: FAMILY MEDICINE

## 2025-01-27 PROCEDURE — 90471 IMMUNIZATION ADMIN: CPT | Performed by: FAMILY MEDICINE

## 2025-01-27 PROCEDURE — 90746 HEPB VACCINE 3 DOSE ADULT IM: CPT | Performed by: FAMILY MEDICINE

## 2025-01-27 PROCEDURE — 99214 OFFICE O/P EST MOD 30 MIN: CPT | Mod: 25 | Performed by: FAMILY MEDICINE

## 2025-01-27 ASSESSMENT — PATIENT HEALTH QUESTIONNAIRE - PHQ9: SUM OF ALL RESPONSES TO PHQ QUESTIONS 1-9: 0

## 2025-01-27 ASSESSMENT — PAIN SCALES - GENERAL: PAINLEVEL_OUTOF10: NO PAIN (0)

## 2025-01-27 NOTE — PROGRESS NOTES
Preventive Care Visit  [facility]  Samy Marcelino MD, Family Medicine  Jan 27, 2025      Assessment & Plan     Encounter for preventative adult health care exam with abnormal findings  Need for hepatitis vaccination  Patient presented today for routine exam with complete health history, physical exam, review of family medical history. Discussed possible preventative health interventions; patient agrees to HepB vaccination and PSA screening based on shared decision-making. Patient encouraged to continue making positive lifestyle changes, with plan for follow-up in one year if patient does not have acute concerns.  - PSA, screen; Future  - HEPATITIS B, ADULT 20+ (ENGERIX-B/RECOMBIVAX HB)    Type 2 diabetes mellitus without complication, without long-term current use of insulin (H)  Stable, well-controlled on tirzepatide and metformin. Last A1C 1/10/25 was 5.4. Creatinine on 1/10 was 1.35 with GFR 62; no evidence of microalbuminuria. Could continue adding ACE/ARB for renal protection if GFR worsens.  - FOOT EXAM    Essential hypertension with goal blood pressure less than 140/90  Well-controlled below goal on metoprolol.     Persistent Atrial fibrillation, unspecified type (H)  Stable, rate controlled with diltiazem and metoprolol, and anticoagulated on apixaban.     Mild persistent asthma without complication  Stable, well-controlled on budesonide-formoterol inhaler. Uses inhaler with exercise; no attacks in last month.    Dyslipidemia  Stable, at goal with LDL of 70. ASCVD risk is 6%. Will continue to monitor.    Counseling  Appropriate preventive services were addressed with this patient via screening, questionnaire, or discussion as appropriate for fall prevention, nutrition, physical activity, Tobacco-use cessation, social engagement, weight loss and cognition.  Checklist reviewing preventive services available has been given to the patient.  Reviewed patient's diet,  "addressing concerns and/or questions.   The patient was instructed to see the dentist every 6 months.     Yamilex Penaloza is a 54 year old, presenting for the following:  Physical        1/27/2025     9:12 AM   Additional Questions   Roomed by unique   Accompanied by self          SERENA Penaloza is a 53yo with PMH HTN, atrial fibrillation, mitral valve repair, asthma, T2DM who presents today for a routine annual physical. He states that he has no acute concerns.    Patient monitors his blood sugar using a continuous glucose monitor. Back in the fall had instances of hypoglycemia at nighttime, with his alarm going off multiple times a night. At that time he adjusted his medications with Dr. Marcelino and has not had issues since. He counts his carbs and calories, following advice from diabetic educator to maintain carb intake below 40 grams at a time. Denies any instances of severe hypoglycemia in the last month.    He notes that his feet sometimes \"fall asleep\" bilaterally, but that his symptoms are worse in the right. He denies pain in his feet related to these symptoms. He states that it happens consistetly throughout the day. He reports checking his feet regularly.     Patient has HTN, with persistent atrial fibrillation anticoagulated on apixaban, but denies any acute distress or symptoms related to this condition. He states, \"there are rare cases when I'll notice it,\" and \"my cardiologist finds me boring.\" Denies dizziness, SOB, chest pain.    He feels that his asthma is well-controlled. He uses his budesonide-formoterol on days that he exercises. He states that his symptoms are triggered by cold weather and exercise. He denies any attacks in the last month. Denies shortness of breath, chest tightness.     The patient has a new job, with some increased stress related to work. Reports that he's been going to the gym regularly, for one hour 3-4 times per week. Has run 5k and 10ks in the past. He has been abstinent from " alcohol for the last two years. Continues to follow-up with group. Feels safe in his relationships. Denies sexual health concerns.     Health Care Directive  Patient does not have a Health Care Directive: Discussed advance care planning with patient; information given to patient to review.      1/22/2025   General Health   How would you rate your overall physical health? Good   Feel stress (tense, anxious, or unable to sleep) Only a little   (!) STRESS CONCERN      1/22/2025   Nutrition   Three or more servings of calcium each day? Yes   Diet: Diabetic   How many servings of fruit and vegetables per day? (!) 2-3   How many sweetened beverages each day? 0-1         1/22/2025   Exercise   Days per week of moderate/strenous exercise 4 days   Average minutes spent exercising at this level 60 min         1/22/2025   Social Factors   Frequency of gathering with friends or relatives Twice a week   Worry food won't last until get money to buy more No   Food not last or not have enough money for food? No   Do you have housing? (Housing is defined as stable permanent housing and does not include staying ouside in a car, in a tent, in an abandoned building, in an overnight shelter, or couch-surfing.) Yes   Are you worried about losing your housing? No   Lack of transportation? No   Unable to get utilities (heat,electricity)? No         1/22/2025   Fall Risk   Fallen 2 or more times in the past year? No   Trouble with walking or balance? No          1/22/2025   Dental   Dentist two times every year? (!) NO         1/22/2025   TB Screening   Were you born outside of the [place]? No         Today's PHQ-2 Score:       1/27/2025     8:55 AM   PHQ-2 ( 1999 Pfizer)   Q1: Little interest or pleasure in doing things 0   Q2: Feeling down, depressed or hopeless 0   PHQ-2 Score 0    Q1: Little interest or pleasure in doing things Not at all   Q2: Feeling down, depressed or hopeless Not at all   PHQ-2 Score 0       Patient-reported            "2025   Substance Use   Alcohol more than 3/day or more than 7/wk Not Applicable   Do you use any other substances recreationally? No     Social History     Tobacco Use    Smoking status: Former     Types: Cigars     Quit date: 1989     Years since quittin.0    Smokeless tobacco: Never    Tobacco comments:     quit smoking cigarettes and cigars at ~ age 22 (was light smoker for about 4 years))   Vaping Use    Vaping status: Never Used   Substance Use Topics    Alcohol use: Not Currently     Comment: since ulcer 2021    Drug use: No           2025   STI Screening   New sexual partner(s) since last STI/HIV test? No   Last PSA:   PSA   Date Value Ref Range Status   2020 2.35 0 - 4 ug/L Final     Comment:     Assay Method:  Chemiluminescence using Siemens Vista analyzer     Prostate Specific Antigen Screen   Date Value Ref Range Status   2024 1.71 0.00 - 3.50 ng/mL Final     ASCVD Risk   The 10-year ASCVD risk score ([place] ARCE, et al., 2019) is: 6%    Values used to calculate the score:      Age: 54 years      Sex: Male      Is Non- : No      Diabetic: Yes      Tobacco smoker: No      Systolic Blood Pressure: 109 mmHg      Is BP treated: Yes      HDL Cholesterol: 59 mg/dL      Total Cholesterol: 139 mg/dL         Reviewed and updated as needed this visit by Provider                    Review of Systems  Constitutional, HEENT, cardiovascular, pulmonary, gi and gu systems are negative, except as otherwise noted.     Objective    Exam  /64   Pulse 65   Temp 97.7  F (36.5  C) (Temporal)   Resp 17   Ht 1.79 m (5' 10.47\")   Wt 89.4 kg (197 lb)   SpO2 100%   BMI 27.89 kg/m     Estimated body mass index is 27.89 kg/m  as calculated from the following:    Height as of this encounter: 1.79 m (5' 10.47\").    Weight as of this encounter: 89.4 kg (197 lb).    Physical Exam  Constitutional:       Appearance: Normal appearance.   HENT:      Right Ear: " Tympanic membrane, ear canal and external ear normal. There is no impacted cerumen.      Left Ear: Tympanic membrane, ear canal and external ear normal. There is no impacted cerumen.      Nose: Nose normal. No congestion or rhinorrhea.      Mouth/Throat:      Mouth: Mucous membranes are moist.      Pharynx: Oropharynx is clear. No oropharyngeal exudate or posterior oropharyngeal erythema.   Eyes:      General: No scleral icterus.        Right eye: No discharge.         Left eye: No discharge.      Extraocular Movements: Extraocular movements intact.      Pupils: Pupils are equal, round, and reactive to light.   Cardiovascular:      Rate and Rhythm: Normal rate. Rhythm irregular.      Pulses: Normal pulses.   Pulmonary:      Effort: Pulmonary effort is normal. No respiratory distress.      Breath sounds: Normal breath sounds. No wheezing or rales.   Abdominal:      General: Abdomen is flat. Bowel sounds are normal. There is no distension.      Palpations: Abdomen is soft. There is no mass.      Tenderness: There is no abdominal tenderness. There is no right CVA tenderness, left CVA tenderness or guarding.   Musculoskeletal:         General: Normal range of motion.      Cervical back: Normal range of motion and neck supple. No rigidity.        Feet:    Feet:      Comments: Noted redness on 5th metatarsal, RLE related to current pair of shoes.  Lymphadenopathy:      Cervical: No cervical adenopathy.   Neurological:      General: No focal deficit present.      Mental Status: He is alert and oriented to person, place, and time.      Sensory: Sensation is intact.      Comments: Sensation intact in BLE in all fields.   Psychiatric:         Mood and Affect: Mood normal.         Behavior: Behavior normal.         Thought Content: Thought content normal.         Judgment: Judgment normal.       Suha Rashid, JENNIFER Student from the [employer]    The information in this document, created by the  nurse practioner student for me, accurately reflects the services I personally performed and the decisions made by me. I have reviewed and approved this document for accuracy prior to leaving the patient care area.    Signed Electronically by: Samy Marcelino MD

## 2025-01-27 NOTE — PATIENT INSTRUCTIONS
1) Keep up the great work managing diet, exercise, and alcohol use!  2) Complete the urinary albumin screening to evaluate your kidneys when you're able.  3) We'll plan to re-check labs related to your diabetes in six months.    Thanks for meeting with me today,  Suha Rashid, Student Nurse Practitioner

## 2025-01-30 ENCOUNTER — LAB (OUTPATIENT)
Dept: LAB | Facility: CLINIC | Age: 55
End: 2025-01-30
Payer: COMMERCIAL

## 2025-01-30 DIAGNOSIS — Z00.01 ENCOUNTER FOR PREVENTATIVE ADULT HEALTH CARE EXAM WITH ABNORMAL FINDINGS: ICD-10-CM

## 2025-01-30 LAB — PSA SERPL DL<=0.01 NG/ML-MCNC: 2.01 NG/ML (ref 0–3.5)

## 2025-02-08 DIAGNOSIS — E11.9 TYPE 2 DIABETES MELLITUS WITHOUT COMPLICATION, WITHOUT LONG-TERM CURRENT USE OF INSULIN (H): Primary | ICD-10-CM

## 2025-02-25 DIAGNOSIS — I48.11 LONGSTANDING PERSISTENT ATRIAL FIBRILLATION (H): ICD-10-CM

## 2025-03-03 ENCOUNTER — MYC MEDICAL ADVICE (OUTPATIENT)
Dept: FAMILY MEDICINE | Facility: CLINIC | Age: 55
End: 2025-03-03
Payer: COMMERCIAL

## 2025-03-03 ENCOUNTER — PATIENT OUTREACH (OUTPATIENT)
Dept: CARE COORDINATION | Facility: CLINIC | Age: 55
End: 2025-03-03
Payer: COMMERCIAL

## 2025-03-03 RX ORDER — DILTIAZEM HYDROCHLORIDE 180 MG/1
CAPSULE, EXTENDED RELEASE ORAL
Qty: 90 CAPSULE | Refills: 3 | Status: SHIPPED | OUTPATIENT
Start: 2025-03-03

## 2025-04-11 DIAGNOSIS — E11.9 TYPE 2 DIABETES MELLITUS WITHOUT COMPLICATION, WITHOUT LONG-TERM CURRENT USE OF INSULIN (H): ICD-10-CM

## 2025-04-13 ENCOUNTER — HEALTH MAINTENANCE LETTER (OUTPATIENT)
Age: 55
End: 2025-04-13

## 2025-04-14 RX ORDER — HYDROCHLOROTHIAZIDE 12.5 MG/1
CAPSULE ORAL
Qty: 6 EACH | Refills: 5 | Status: SHIPPED | OUTPATIENT
Start: 2025-04-14

## 2025-05-01 ENCOUNTER — DOCUMENTATION ONLY (OUTPATIENT)
Dept: SLEEP MEDICINE | Facility: CLINIC | Age: 55
End: 2025-05-01
Payer: COMMERCIAL

## 2025-05-01 NOTE — PROGRESS NOTES
Patient came to [facility]  for mask fitting appointment on May 1, 2025. Patient requested to switch masks because he has been wearing ComfortGel Blue nasal mask, which has been discontinued. Discussed the following masks: Airfit N20 Large, Airtouch N20 large, Eson 2 large, Airfit P30i standard, Airfit N30i standard. Patient selected a Resmed mask name: Airfit N20 nasal mask size large.

## 2025-05-18 DIAGNOSIS — E78.5 DYSLIPIDEMIA: ICD-10-CM

## 2025-05-18 DIAGNOSIS — I48.11 LONGSTANDING PERSISTENT ATRIAL FIBRILLATION (H): Primary | ICD-10-CM

## 2025-05-18 DIAGNOSIS — Z98.890 S/P MVR (MITRAL VALVE REPAIR): ICD-10-CM

## 2025-05-21 NOTE — TELEPHONE ENCOUNTER
Eliquis Oral Tablet 2.5 MG   Last Written Prescription:  ?  ----------------------  Last Visit Date: 1/13/25  Future Visit Date: None  -----------------  Refill decision: Medication unable to be refilled by RN due to: Medication - Last script is Reported/Historical/Transitional    Request from pharmacy:  Requested Prescriptions   Pending Prescriptions Disp Refills    ELIQUIS ANTICOAGULANT 2.5 MG tablet [Pharmacy Med Name: Eliquis Oral Tablet 2.5 MG] 180 tablet 0     Sig: Take 1 tablet (2.5 mg) by mouth 2 times daily       Anticoagulant Agents Failed - 5/21/2025  9:15 AM        Failed - Medication indicated for associated diagnosis     Medication is associated with one or more of the following diagnoses:  Atrial fibrillation  Deep venous thrombosis  Heparin-induced thrombocytopenia  Pulmonary embolism  Venous thromboembolism  H/O: Pulmonary embolus  Atrial flutter  Coronary artery finding  Transient cerebral ischemia  Percutaneous transluminal coronary angioplasty  Stable angina  Intermittent claudication  Arteriosclerotic vascular disease          Passed - Normal Platelets on file in past 12 months     Recent Labs   Lab Test 10/16/24  1311                               Passed - Creatinine Clearance greater than 50 ml/min on file in past 12 mos     No lab results found.          Passed - Weight is greater than 60 kg for the past year     Wt Readings from Last 3 Encounters:   04/18/25 88.5 kg (195 lb)   01/27/25 89.4 kg (197 lb)   01/13/25 88.5 kg (195 lb)             Passed - GFR on file in the past 12 months and most recent GFR is normal        Passed - Recent (6 month) or future (90 days) visit with the authorizing provider's specialty (provided they have been seen in the past 9 months)        Passed - Patient is 18 years of age or older

## 2025-06-06 ENCOUNTER — TRANSFERRED RECORDS (OUTPATIENT)
Dept: MULTI SPECIALTY CLINIC | Facility: CLINIC | Age: 55
End: 2025-06-06

## 2025-06-06 LAB — RETINOPATHY: NORMAL

## 2025-06-22 DIAGNOSIS — E11.9 TYPE 2 DIABETES MELLITUS WITHOUT COMPLICATION, WITHOUT LONG-TERM CURRENT USE OF INSULIN (H): ICD-10-CM

## 2025-06-26 NOTE — PROGRESS NOTES
Medication Therapy Management (MTM) Encounter    ASSESSMENT:                            Medication Adherence/Access: No issues identified.    Afib/HTN  Stable. BP meeting goal of less 130/80 mmHg.       Diabetes   Stable. Patient is meeting A1c goal of < 7%. Recommend patient follow-up with PCP regarding recent hypoglycemic events.     Gout  Stable    Asthma  Stable. Reminded patient that Symbicort prescription is for twice a day     Supplements   Stable      PLAN:                            Follow-up with primary care clinic to connect with new PCP.     Follow-up: as needed    SUBJECTIVE/OBJECTIVE:                          Tremaine King is a 54 year old male seen for an initial visit with me but patient previously seen by   Adelaide Watkins in 10/2024. He was referred to me from Valentina James MD    Reason for visit: annual visit.    Allergies/ADRs: Reviewed in chart  Past Medical History: Reviewed in chart  Tobacco: He reports that he quit smoking about 36 years ago. His smoking use included cigars. He has never been exposed to tobacco smoke. He has never used smokeless tobacco.  Alcohol: none. Dry for 3 years.     Medication Adherence/Access: no issues reported.    Afib/HTN  Eliquis 2.5mg twice daily  Metoprolol ER 50 mg daily   Diltiazem  mg daily     No signs or symptoms of bleeding on Eliquis.     Patient self-monitors blood pressure.  Home BP monitoring this AM was 106 for systolic, but does not remember the diastolic number.    Denies dizziness or lightheadedness.     [id number] Score    Date Calculated: 4/18/2025 12:24 PM  [id number] Score: 2       BP Readings from Last 3 Encounters:   04/18/25 107/61   01/27/25 109/64   01/13/25 116/68         Diabetes   Mounjaro 5mg every 7 days  Metformin 500 mg in the morning 250 mg in the evening     Patient reports his blood sugars have been well controlled lately. He mentioned when he started to increase his exercise, he had some low blood sugars in  the evening, so he reduced his metformin dose. Since reducing dose he has not had low blood sugars.     Diet/Exercise: running 4 times a week, training for a marathon. He said Mounjaro has helped him cut cravings.   Per chart review, A1c on 1/10/25 was 5.4%.    He mentions his normal PCP retired and needs a new PCP.        Gout:   Allopurinol 200 mg daily   No gout flares for a very long time.     Asthma:   Albuterol inhaler as needed  Symbicort 80-4.5 mcg/act inhaler: 2 puffs twice a day  Haven't needed to use albuterol in a while.   Forgets to take evening dose of Symbicort sometimes.     Supplements   Multivitamin daily   No concerns       ----------------    I spent 20 minutes with this patient today. I offer these suggestions for consideration by Valentina James MD.     A summary of these recommendations was sent via listedplaces.    Regina Lal, PharmD  Medication Therapy Management Pharmacist   [facility]    Telemedicine Visit Details  The patient's medications can be safely assessed via a telemedicine encounter.  Type of service:  Telephone visit  Originating Location (pt. Location): Home    Distant Location (provider location):  Off-site  Start Time: 8:00 AM  End Time: 8:20 AM     Medication Therapy Recommendations  No medication therapy recommendations to display

## 2025-07-02 ENCOUNTER — VIRTUAL VISIT (OUTPATIENT)
Facility: CLINIC | Age: 55
End: 2025-07-02
Attending: INTERNAL MEDICINE
Payer: COMMERCIAL

## 2025-07-02 DIAGNOSIS — E11.9 TYPE 2 DIABETES MELLITUS WITHOUT COMPLICATION, WITH LONG-TERM CURRENT USE OF INSULIN (H): ICD-10-CM

## 2025-07-02 DIAGNOSIS — J45.30 MILD PERSISTENT ASTHMA WITHOUT COMPLICATION: ICD-10-CM

## 2025-07-02 DIAGNOSIS — I48.91 ATRIAL FIBRILLATION, UNSPECIFIED TYPE (H): ICD-10-CM

## 2025-07-02 DIAGNOSIS — I10 ESSENTIAL HYPERTENSION WITH GOAL BLOOD PRESSURE LESS THAN 140/90: Primary | ICD-10-CM

## 2025-07-02 DIAGNOSIS — Z79.4 TYPE 2 DIABETES MELLITUS WITHOUT COMPLICATION, WITH LONG-TERM CURRENT USE OF INSULIN (H): ICD-10-CM

## 2025-07-02 DIAGNOSIS — M10.00 IDIOPATHIC GOUT, UNSPECIFIED CHRONICITY, UNSPECIFIED SITE: ICD-10-CM

## 2025-07-02 NOTE — Clinical Note
Hi Dr. James,   I spoke to Tremaine for annual review of medications. He is doing well. He noticed some low blood sugars, so he decreased his metformin dose which helped. He does need to find a new PCP as his old PCP retired. Recommended he do so soon to follow up on diabetes regimen.   Regina Lal, PharmD Medication Therapy Management Pharmacist  [facility]

## 2025-07-02 NOTE — PATIENT INSTRUCTIONS
"Recommendations from today's MTM visit:                                                      Follow-up with primary care clinic to connect with new PCP.     Follow-up: as needed    It was great speaking with you today.  I value your experience and would be very thankful for your time in providing feedback in our clinic survey. In the next few days, you may receive an email or text message from [facility] [place] with a link to a survey related to your  clinical pharmacist.\"     To schedule another MTM appointment, please call the clinic directly or you may call the MTM scheduling line at [phone].    My Clinical Pharmacist's contact information:                                                      Please feel free to contact me with any questions or concerns you have.      Regina Lal, PharmD  Medication Therapy Management Pharmacist   [facility]     "

## 2025-07-27 ENCOUNTER — HEALTH MAINTENANCE LETTER (OUTPATIENT)
Age: 55
End: 2025-07-27

## 2025-07-28 ENCOUNTER — MYC MEDICAL ADVICE (OUTPATIENT)
Dept: CARDIOLOGY | Facility: CLINIC | Age: 55
End: 2025-07-28
Payer: COMMERCIAL

## 2025-07-28 DIAGNOSIS — I10 ESSENTIAL HYPERTENSION WITH GOAL BLOOD PRESSURE LESS THAN 140/90: ICD-10-CM

## 2025-07-28 RX ORDER — METOPROLOL SUCCINATE 100 MG/1
100 TABLET, EXTENDED RELEASE ORAL DAILY
Qty: 90 TABLET | Refills: 0 | OUTPATIENT
Start: 2025-07-28

## 2025-07-28 RX ORDER — METOPROLOL SUCCINATE 50 MG/1
50 TABLET, EXTENDED RELEASE ORAL DAILY
Qty: 90 TABLET | Refills: 3 | Status: SHIPPED | OUTPATIENT
Start: 2025-07-28

## 2025-07-28 NOTE — TELEPHONE ENCOUNTER
Clinic RN: Please investigate patient's chart or contact patient if the information cannot be found because the medication is listed as historical or discontinued. Confirm patient is taking this medication. Document findings and route refill encounter to provider for approval or denial.  Thanks,  Fryea KIRKLAND RN

## 2025-07-28 NOTE — TELEPHONE ENCOUNTER
Pt called back.   Pt will request refill from prescribing provider - he believes it is his cardiologist.   All questions were answered.     Judit MAYO RN

## 2025-07-28 NOTE — TELEPHONE ENCOUNTER
"Attempted to call pt. Left message requesting a call back to the clinic to relay the following from Dr. Peterson.    \"Please call patient   per Ad[facility] note this medication was stopped ( is now on 50 ng)\"    Salome Oswald RN  [place]   "

## 2025-07-28 NOTE — TELEPHONE ENCOUNTER
Pt is currently taking. Please see note from 7/2/25. Thanks    Salome Oswald RN  [facility]

## 2025-08-01 ENCOUNTER — LAB (OUTPATIENT)
Dept: LAB | Facility: CLINIC | Age: 55
End: 2025-08-01
Payer: COMMERCIAL

## 2025-08-01 DIAGNOSIS — E11.9 TYPE 2 DIABETES MELLITUS WITHOUT COMPLICATION, WITH LONG-TERM CURRENT USE OF INSULIN (H): Primary | ICD-10-CM

## 2025-08-01 DIAGNOSIS — Z79.4 TYPE 2 DIABETES MELLITUS WITHOUT COMPLICATION, WITH LONG-TERM CURRENT USE OF INSULIN (H): Primary | ICD-10-CM

## 2025-08-04 ASSESSMENT — ASTHMA QUESTIONNAIRES
QUESTION_4 LAST FOUR WEEKS HOW OFTEN HAVE YOU USED YOUR RESCUE INHALER OR NEBULIZER MEDICATION (SUCH AS ALBUTEROL): TWO OR THREE TIMES PER WEEK
QUESTION_1 LAST FOUR WEEKS HOW MUCH OF THE TIME DID YOUR ASTHMA KEEP YOU FROM GETTING AS MUCH DONE AT WORK, SCHOOL OR AT HOME: NONE OF THE TIME
QUESTION_5 LAST FOUR WEEKS HOW WOULD YOU RATE YOUR ASTHMA CONTROL: COMPLETELY CONTROLLED
QUESTION_3 LAST FOUR WEEKS HOW OFTEN DID YOUR ASTHMA SYMPTOMS (WHEEZING, COUGHING, SHORTNESS OF BREATH, CHEST TIGHTNESS OR PAIN) WAKE YOU UP AT NIGHT OR EARLIER THAN USUAL IN THE MORNING: NOT AT ALL
QUESTION_2 LAST FOUR WEEKS HOW OFTEN HAVE YOU HAD SHORTNESS OF BREATH: NOT AT ALL

## 2025-08-05 ASSESSMENT — ASTHMA QUESTIONNAIRES: ACT_TOTALSCORE: 23

## 2025-08-07 ENCOUNTER — OFFICE VISIT (OUTPATIENT)
Dept: INTERNAL MEDICINE | Facility: CLINIC | Age: 55
End: 2025-08-07
Payer: COMMERCIAL

## 2025-08-07 VITALS
WEIGHT: 192.9 LBS | BODY MASS INDEX: 27.01 KG/M2 | HEART RATE: 64 BPM | SYSTOLIC BLOOD PRESSURE: 112 MMHG | TEMPERATURE: 97.9 F | RESPIRATION RATE: 16 BRPM | HEIGHT: 71 IN | OXYGEN SATURATION: 99 % | DIASTOLIC BLOOD PRESSURE: 68 MMHG

## 2025-08-07 DIAGNOSIS — E11.649 TYPE 2 DIABETES MELLITUS WITH HYPOGLYCEMIA WITHOUT COMA, WITH LONG-TERM CURRENT USE OF INSULIN (H): Primary | ICD-10-CM

## 2025-08-07 DIAGNOSIS — Z79.4 TYPE 2 DIABETES MELLITUS WITH HYPOGLYCEMIA WITHOUT COMA, WITH LONG-TERM CURRENT USE OF INSULIN (H): Primary | ICD-10-CM

## 2025-08-07 ASSESSMENT — PATIENT HEALTH QUESTIONNAIRE - PHQ9
SUM OF ALL RESPONSES TO PHQ QUESTIONS 1-9: 0
SUM OF ALL RESPONSES TO PHQ QUESTIONS 1-9: 0

## 2025-08-11 ENCOUNTER — TRANSFERRED RECORDS (OUTPATIENT)
Dept: HEALTH INFORMATION MANAGEMENT | Facility: CLINIC | Age: 55
End: 2025-08-11
Payer: COMMERCIAL

## 2025-08-18 DIAGNOSIS — E11.65 TYPE 2 DIABETES MELLITUS WITH HYPERGLYCEMIA, WITHOUT LONG-TERM CURRENT USE OF INSULIN (H): ICD-10-CM

## 2025-08-18 RX ORDER — TIRZEPATIDE 5 MG/.5ML
INJECTION, SOLUTION SUBCUTANEOUS
Qty: 2 ML | Refills: 0 | OUTPATIENT
Start: 2025-08-18

## (undated) RX ORDER — FENTANYL CITRATE 50 UG/ML
INJECTION, SOLUTION INTRAMUSCULAR; INTRAVENOUS
Status: DISPENSED
Start: 2021-11-10

## (undated) RX ORDER — FENTANYL CITRATE 50 UG/ML
INJECTION, SOLUTION INTRAMUSCULAR; INTRAVENOUS
Status: DISPENSED
Start: 2022-01-11

## (undated) RX ORDER — EPINEPHRINE IN SOD CHLOR,ISO 1 MG/10 ML
SYRINGE (ML) INTRAVENOUS
Status: DISPENSED
Start: 2021-11-10